# Patient Record
Sex: MALE | Race: OTHER | HISPANIC OR LATINO | Employment: FULL TIME | URBAN - METROPOLITAN AREA
[De-identification: names, ages, dates, MRNs, and addresses within clinical notes are randomized per-mention and may not be internally consistent; named-entity substitution may affect disease eponyms.]

---

## 2022-02-17 ENCOUNTER — HOSPITAL ENCOUNTER (EMERGENCY)
Facility: HOSPITAL | Age: 22
Discharge: HOME | End: 2022-02-17
Attending: EMERGENCY MEDICINE | Admitting: EMERGENCY MEDICINE

## 2022-02-17 VITALS
HEART RATE: 68 BPM | TEMPERATURE: 98.7 F | OXYGEN SATURATION: 99 % | WEIGHT: 137.57 LBS | DIASTOLIC BLOOD PRESSURE: 57 MMHG | RESPIRATION RATE: 17 BRPM | SYSTOLIC BLOOD PRESSURE: 96 MMHG

## 2022-02-17 DIAGNOSIS — G43.909 MIGRAINE: Primary | ICD-10-CM

## 2022-02-17 DIAGNOSIS — R73.09 ELEVATED GLUCOSE: ICD-10-CM

## 2022-02-17 DIAGNOSIS — E11.9 DIABETES (HCC): ICD-10-CM

## 2022-02-17 LAB
ALBUMIN SERPL BCP-MCNC: 4.4 G/DL (ref 3.5–5)
ALP SERPL-CCNC: 106 U/L (ref 46–116)
ALT SERPL W P-5'-P-CCNC: 15 U/L (ref 12–78)
ANION GAP SERPL CALCULATED.3IONS-SCNC: 10 MMOL/L (ref 4–13)
AST SERPL W P-5'-P-CCNC: 15 U/L (ref 5–45)
BASOPHILS # BLD AUTO: 0.04 THOUSANDS/ΜL (ref 0–0.1)
BASOPHILS NFR BLD AUTO: 1 % (ref 0–1)
BILIRUB SERPL-MCNC: 0.45 MG/DL (ref 0.2–1)
BUN SERPL-MCNC: 15 MG/DL (ref 5–25)
CALCIUM SERPL-MCNC: 8.9 MG/DL (ref 8.3–10.1)
CHLORIDE SERPL-SCNC: 100 MMOL/L (ref 100–108)
CO2 SERPL-SCNC: 26 MMOL/L (ref 21–32)
CREAT SERPL-MCNC: 0.82 MG/DL (ref 0.6–1.3)
EOSINOPHIL # BLD AUTO: 0.08 THOUSAND/ΜL (ref 0–0.61)
EOSINOPHIL NFR BLD AUTO: 1 % (ref 0–6)
ERYTHROCYTE [DISTWIDTH] IN BLOOD BY AUTOMATED COUNT: 11.4 % (ref 11.6–15.1)
GFR SERPL CREATININE-BSD FRML MDRD: 123 ML/MIN/1.73SQ M
GLUCOSE SERPL-MCNC: 353 MG/DL (ref 65–140)
GLUCOSE SERPL-MCNC: 387 MG/DL (ref 65–140)
HCT VFR BLD AUTO: 45.8 % (ref 36.5–49.3)
HGB BLD-MCNC: 16.4 G/DL (ref 12–17)
IMM GRANULOCYTES # BLD AUTO: 0.01 THOUSAND/UL (ref 0–0.2)
IMM GRANULOCYTES NFR BLD AUTO: 0 % (ref 0–2)
LYMPHOCYTES # BLD AUTO: 1.94 THOUSANDS/ΜL (ref 0.6–4.47)
LYMPHOCYTES NFR BLD AUTO: 23 % (ref 14–44)
MCH RBC QN AUTO: 30.3 PG (ref 26.8–34.3)
MCHC RBC AUTO-ENTMCNC: 35.8 G/DL (ref 31.4–37.4)
MCV RBC AUTO: 85 FL (ref 82–98)
MONOCYTES # BLD AUTO: 0.6 THOUSAND/ΜL (ref 0.17–1.22)
MONOCYTES NFR BLD AUTO: 7 % (ref 4–12)
NEUTROPHILS # BLD AUTO: 5.82 THOUSANDS/ΜL (ref 1.85–7.62)
NEUTS SEG NFR BLD AUTO: 68 % (ref 43–75)
NRBC BLD AUTO-RTO: 0 /100 WBCS
PLATELET # BLD AUTO: 277 THOUSANDS/UL (ref 149–390)
PMV BLD AUTO: 10.9 FL (ref 8.9–12.7)
POTASSIUM SERPL-SCNC: 4 MMOL/L (ref 3.5–5.3)
PROT SERPL-MCNC: 7.8 G/DL (ref 6.4–8.2)
RBC # BLD AUTO: 5.41 MILLION/UL (ref 3.88–5.62)
SODIUM SERPL-SCNC: 136 MMOL/L (ref 136–145)
WBC # BLD AUTO: 8.49 THOUSAND/UL (ref 4.31–10.16)

## 2022-02-17 PROCEDURE — 36415 COLL VENOUS BLD VENIPUNCTURE: CPT | Performed by: EMERGENCY MEDICINE

## 2022-02-17 PROCEDURE — 80053 COMPREHEN METABOLIC PANEL: CPT | Performed by: EMERGENCY MEDICINE

## 2022-02-17 PROCEDURE — 96374 THER/PROPH/DIAG INJ IV PUSH: CPT

## 2022-02-17 PROCEDURE — 96361 HYDRATE IV INFUSION ADD-ON: CPT

## 2022-02-17 PROCEDURE — 99283 EMERGENCY DEPT VISIT LOW MDM: CPT | Performed by: EMERGENCY MEDICINE

## 2022-02-17 PROCEDURE — 82948 REAGENT STRIP/BLOOD GLUCOSE: CPT

## 2022-02-17 PROCEDURE — 96375 TX/PRO/DX INJ NEW DRUG ADDON: CPT

## 2022-02-17 PROCEDURE — 99285 EMERGENCY DEPT VISIT HI MDM: CPT

## 2022-02-17 PROCEDURE — 85025 COMPLETE CBC W/AUTO DIFF WBC: CPT | Performed by: EMERGENCY MEDICINE

## 2022-02-17 RX ORDER — DIPHENHYDRAMINE HYDROCHLORIDE 50 MG/ML
25 INJECTION INTRAMUSCULAR; INTRAVENOUS ONCE
Status: COMPLETED | OUTPATIENT
Start: 2022-02-17 | End: 2022-02-17

## 2022-02-17 RX ORDER — KETOROLAC TROMETHAMINE 30 MG/ML
15 INJECTION, SOLUTION INTRAMUSCULAR; INTRAVENOUS ONCE
Status: COMPLETED | OUTPATIENT
Start: 2022-02-17 | End: 2022-02-17

## 2022-02-17 RX ORDER — METOCLOPRAMIDE HYDROCHLORIDE 5 MG/ML
10 INJECTION INTRAMUSCULAR; INTRAVENOUS ONCE
Status: COMPLETED | OUTPATIENT
Start: 2022-02-17 | End: 2022-02-17

## 2022-02-17 RX ORDER — ACETAMINOPHEN 325 MG/1
975 TABLET ORAL ONCE
Status: COMPLETED | OUTPATIENT
Start: 2022-02-17 | End: 2022-02-17

## 2022-02-17 RX ADMIN — METOCLOPRAMIDE 10 MG: 5 INJECTION, SOLUTION INTRAMUSCULAR; INTRAVENOUS at 18:20

## 2022-02-17 RX ADMIN — DIPHENHYDRAMINE HYDROCHLORIDE 25 MG: 50 INJECTION, SOLUTION INTRAMUSCULAR; INTRAVENOUS at 18:18

## 2022-02-17 RX ADMIN — SODIUM CHLORIDE 1000 ML: 0.9 INJECTION, SOLUTION INTRAVENOUS at 18:16

## 2022-02-17 RX ADMIN — ACETAMINOPHEN 975 MG: 325 TABLET, FILM COATED ORAL at 18:24

## 2022-02-17 RX ADMIN — KETOROLAC TROMETHAMINE 15 MG: 30 INJECTION, SOLUTION INTRAMUSCULAR at 18:23

## 2022-02-17 NOTE — ED ATTENDING ATTESTATION
2/17/2022  I, Jazzmine Sands MD, saw and evaluated the patient  I have discussed the patient with the resident/non-physician practitioner and agree with the resident's/non-physician practitioner's findings, Plan of Care, and MDM as documented in the resident's/non-physician practitioner's note, except where noted  All available labs and Radiology studies were reviewed  I was present for key portions of any procedure(s) performed by the resident/non-physician practitioner and I was immediately available to provide assistance  At this point I agree with the current assessment done in the Emergency Department  I have conducted an independent evaluation of this patient a history and physical is as follows:  22 yo M with IDDM type 1, glaucoma, c/o onset of right sided HA, earlier today, more intense than usual headaches, gradual onset, associated with photophobia, no N/V, no fever  Elevated blood sugar at home  Did not respond to NSAIDs at home  VS normal   No focal deficits  No meningismus  NAD  For his hyperglycemia, confirm no DKA or other abnormalities, replete fluids, treat headache, reassess        ED Course         Critical Care Time  Procedures

## 2022-02-17 NOTE — ED PROVIDER NOTES
History  Chief Complaint   Patient presents with    Dizziness     pt reports headache, nausea, dizziness and light sensitivity beginning this afternoon   Headache    Hyperglycemia - Symptomatic     pt reports elevated glucose in 400s x1 week     23year old male with a PMH of DM and glaucoma presents with a headache and high glucose  The headache began this afternoon  It is right sided and gradually got worse after it started  He has associated lightheadedness, but no room spinning dizziness  He reports photophobia and he feels like his vision gets blurry when he looks at the light  Patient reports getting right sided headaches in the past, but never this bad  His mom has a history of migraines  He tried Advil at 2:30 and it did not help  Patient also reports blood sugars in the 400s for the past week  He uses insulin and is still doing so as directed  He is still eating and drinking normally  He does not have a doctor that he follow with at this time   [id number] used  None       Past Medical History:   Diagnosis Date    Diabetes mellitus ([facility] [address] )     Glaucoma     Subaortic membrane        Past Surgical History:   Procedure Laterality Date    CARDIAC SURGERY         No family history on file  I have reviewed and agree with the history as documented  E-Cigarette/Vaping     E-Cigarette/Vaping Substances     Social History     Tobacco Use    Smoking status: Not on file    Smokeless tobacco: Not on file   Substance Use Topics    Alcohol use: Not on file    Drug use: Not on file        Review of Systems   Constitutional: Negative for appetite change, chills, fatigue and fever  HENT: Negative  Eyes: Positive for photophobia  Respiratory: Negative for cough, chest tightness and shortness of breath  Cardiovascular: Negative for chest pain and palpitations  Gastrointestinal: Negative for abdominal pain, diarrhea, nausea and vomiting  Endocrine: Negative  Genitourinary: Negative for difficulty urinating and hematuria  Musculoskeletal: Negative for arthralgias and myalgias  Skin: Negative for pallor and rash  Allergic/Immunologic: Negative  Neurological: Positive for light-headedness and headaches  Negative for dizziness and weakness  Hematological: Negative  Physical Exam  ED Triage Vitals   Temperature Pulse Respirations Blood Pressure SpO2   02/17/22 1710 02/17/22 1711 02/17/22 1711 02/17/22 1711 02/17/22 1711   98 7 °F (37 1 °C) 91 16 109/65 98 %      Temp Source Heart Rate Source Patient Position - Orthostatic VS BP Location FiO2 (%)   02/17/22 1710 02/17/22 1710 02/17/22 1711 02/17/22 1711 --   Oral Monitor Sitting Right arm       Pain Score       02/17/22 1820       6             Orthostatic Vital Signs  Vitals:    02/17/22 1711 02/17/22 1854   BP: 109/65 96/57   Pulse: 91 68   Patient Position - Orthostatic VS: Sitting Lying       Physical Exam  Vitals and nursing note reviewed  Constitutional:       General: He is not in acute distress  Appearance: Normal appearance  He is not ill-appearing  HENT:      Head: Normocephalic and atraumatic  Eyes:      Conjunctiva/sclera: Conjunctivae normal    Cardiovascular:      Rate and Rhythm: Normal rate and regular rhythm  Pulmonary:      Effort: Pulmonary effort is normal    Musculoskeletal:         General: Normal range of motion  Cervical back: Normal range of motion and neck supple  Skin:     General: Skin is warm and dry  Neurological:      General: No focal deficit present  Mental Status: He is alert and oriented to person, place, and time  Cranial Nerves: No cranial nerve deficit  Motor: No weakness           ED Medications  Medications   sodium chloride 0 9 % bolus 1,000 mL (0 mL Intravenous Stopped 2/17/22 1858)   ketorolac (TORADOL) injection 15 mg (15 mg Intravenous Given 2/17/22 1823)   acetaminophen (TYLENOL) tablet 975 mg (975 mg Oral Given 2/17/22 1824) metoclopramide (REGLAN) injection 10 mg (10 mg Intravenous Given 2/17/22 1820)   diphenhydrAMINE (BENADRYL) injection 25 mg (25 mg Intravenous Given 2/17/22 1818)       Diagnostic Studies  Results Reviewed     Procedure Component Value Units Date/Time    Comprehensive metabolic panel [[id number]]  (Abnormal) Collected: 02/17/22 1825    Lab Status: Final result Specimen: Blood from Arm, Right Updated: 02/17/22 1853     Sodium 136 mmol/L      Potassium 4 0 mmol/L      Chloride 100 mmol/L      CO2 26 mmol/L      ANION GAP 10 mmol/L      BUN 15 mg/dL      Creatinine 0 82 mg/dL      Glucose 387 mg/dL      Calcium 8 9 mg/dL      AST 15 U/L      ALT 15 U/L      Alkaline Phosphatase 106 U/L      Total Protein 7 8 g/dL      Albumin 4 4 g/dL      Total Bilirubin 0 45 mg/dL      eGFR 123 ml/min/1 73sq m     Narrative:      National Kidney Disease Foundation guidelines for Chronic Kidney Disease (CKD):     Stage 1 with normal or high GFR (GFR > 90 mL/min/1 73 square meters)    Stage 2 Mild CKD (GFR = 60-89 mL/min/1 73 square meters)    Stage 3A Moderate CKD (GFR = 45-59 mL/min/1 73 square meters)    Stage 3B Moderate CKD (GFR = 30-44 mL/min/1 73 square meters)    Stage 4 Severe CKD (GFR = 15-29 mL/min/1 73 square meters)    Stage 5 End Stage CKD (GFR <15 mL/min/1 73 square meters)  Note: GFR calculation is accurate only with a steady state creatinine    CBC and differential [[id number]]  (Abnormal) Collected: 02/17/22 1825    Lab Status: Final result Specimen: Blood from Arm, Right Updated: 02/17/22 1838     WBC 8 49 Thousand/uL      RBC 5 41 Million/uL      Hemoglobin 16 4 g/dL      Hematocrit 45 8 %      MCV 85 fL      MCH 30 3 pg      MCHC 35 8 g/dL      RDW 11 4 %      MPV 10 9 fL      Platelets 167 Thousands/uL      nRBC 0 /100 WBCs      Neutrophils Relative 68 %      Immat GRANS % 0 %      Lymphocytes Relative 23 %      Monocytes Relative 7 %      Eosinophils Relative 1 %      Basophils Relative 1 %      Neutrophils Absolute 5 82 Thousands/µL      Immature Grans Absolute 0 01 Thousand/uL      Lymphocytes Absolute 1 94 Thousands/µL      Monocytes Absolute 0 60 Thousand/µL      Eosinophils Absolute 0 08 Thousand/µL      Basophils Absolute 0 04 Thousands/µL     Fingerstick Glucose (POCT) [[id number]]  (Abnormal) Collected: 02/17/22 1831    Lab Status: Final result Updated: 02/17/22 1832     POC Glucose 353 mg/dl                  No orders to display         Procedures  Procedures      ED Course                                       MDM  Number of Diagnoses or Management Options  Diabetes ([facility] ): established and worsening  Elevated glucose: established and worsening  Migraine: established and improving  Diagnosis management comments: 77-year-old female with history of diabetes and glaucoma presents with headache and elevated blood sugars  Patient has had similar headaches in the past   I spoke to patient about blood glucose management  I told him that his headache could be exacerbated by his high blood sugars  I told him that he needs follow-up with a doctor  Patient does not currently have 1  Will give patient a doctor to follow up with  Will treat migraine  Will order basic labs to make sure that patient does not have anion gap or abnormal bicarb  Amount and/or Complexity of Data Reviewed  Clinical lab tests: ordered and reviewed  Review and summarize past medical records: yes  Discuss the patient with other providers: yes    Risk of Complications, Morbidity, and/or Mortality  Presenting problems: moderate  Diagnostic procedures: moderate  Management options: moderate    Patient Progress  Patient progress: improved    Patient felt better after receiving medications here  I spoke with him about the importance of follow-up for his blood sugars  Patient was given a referral to [facility]  Return precautions given      Disposition  Final diagnoses:   Migraine   Elevated glucose   Diabetes ([facility] )     Time reflects when diagnosis was documented in both MDM as applicable and the Disposition within this note     Time User Action Codes Description Comment    2/17/2022  7:14 PM Bouchra Schmidt [K70 828] Migraine     2/17/2022  7:15 PM Shanna Garcia Add [R73 09] Elevated glucose     2/17/2022  7:15 PM Yoselin Lombardi Add [E11 9] Diabetes [facility])       ED Disposition     ED Disposition Condition Date/Time Comment    Discharge Good Thu Feb 17, 2022  7:14 PM Regan Moreno discharge to home/self care  Follow-up Information     Follow up With Specialties Details Why Contact Info Additional [address]   [address], [address] [facility] [id number]  [address] [place] [address], [address], [address] [place], [place], [address] [place] [address] Avenue          There are no discharge medications for this patient  PDMP Review     None           ED Provider  Attending physically available and evaluated Regan Moreno I managed the patient along with the ED Attending      Electronically Signed by         Akin Leslie DO  02/17/22 2010

## 2022-02-17 NOTE — Clinical Note
Josette Costa was seen and treated in our emergency department on 2/17/2022  Diagnosis:     Leeanne Mendieta  may return to work on return date  He may return on this date: 02/18/2022         If you have any questions or concerns, please don't hesitate to call        eVntura Ibrahim DO    ______________________________           _______________          _______________  Hospital Representative                              Date                                Time

## 2022-02-18 NOTE — ED NOTES
AVS reviewed with pt by provider, pt verbalized understanding of d/c instructions  No questions or concerns  VSS  Pt left ambulatory with steady gait with all belongings        Vassie Opitz, RN  02/17/22 1944

## 2022-02-18 NOTE — DISCHARGE INSTRUCTIONS
You have been seen for a headache and diabetes  Your blood sugars have been very high and you might need your insulin regimen changed  You should return to the ED if you develop vomiting, mental status change, trouble breathing, or other worsening symptoms  Follow up with your primary care physician

## 2022-03-07 ENCOUNTER — APPOINTMENT (EMERGENCY)
Dept: CT IMAGING | Facility: HOSPITAL | Age: 22
End: 2022-03-07

## 2022-03-07 ENCOUNTER — HOSPITAL ENCOUNTER (EMERGENCY)
Facility: HOSPITAL | Age: 22
Discharge: HOME | End: 2022-03-07
Attending: EMERGENCY MEDICINE

## 2022-03-07 VITALS
TEMPERATURE: 98.9 F | SYSTOLIC BLOOD PRESSURE: 112 MMHG | RESPIRATION RATE: 16 BRPM | DIASTOLIC BLOOD PRESSURE: 62 MMHG | WEIGHT: 131.84 LBS | HEART RATE: 86 BPM | OXYGEN SATURATION: 98 %

## 2022-03-07 DIAGNOSIS — E87.6 HYPOKALEMIA: ICD-10-CM

## 2022-03-07 DIAGNOSIS — S20.211A RIB CONTUSION, RIGHT, INITIAL ENCOUNTER: Primary | ICD-10-CM

## 2022-03-07 DIAGNOSIS — R73.9 HYPERGLYCEMIA: ICD-10-CM

## 2022-03-07 DIAGNOSIS — R10.9 ABDOMINAL PAIN: ICD-10-CM

## 2022-03-07 DIAGNOSIS — N28.1 RENAL CYST: ICD-10-CM

## 2022-03-07 DIAGNOSIS — R51.9 HEADACHE: ICD-10-CM

## 2022-03-07 LAB
ALBUMIN SERPL BCP-MCNC: 3.1 G/DL (ref 3.5–5)
ALP SERPL-CCNC: 84 U/L (ref 46–116)
ALT SERPL W P-5'-P-CCNC: 16 U/L (ref 12–78)
ANION GAP SERPL CALCULATED.3IONS-SCNC: 10 MMOL/L (ref 4–13)
AST SERPL W P-5'-P-CCNC: 10 U/L (ref 5–45)
BASOPHILS # BLD AUTO: 0.02 THOUSANDS/ΜL (ref 0–0.1)
BASOPHILS NFR BLD AUTO: 0 % (ref 0–1)
BILIRUB SERPL-MCNC: 0.64 MG/DL (ref 0.2–1)
BUN SERPL-MCNC: 11 MG/DL (ref 5–25)
CALCIUM ALBUM COR SERPL-MCNC: 8.7 MG/DL (ref 8.3–10.1)
CALCIUM SERPL-MCNC: 8 MG/DL (ref 8.3–10.1)
CHLORIDE SERPL-SCNC: 103 MMOL/L (ref 100–108)
CO2 SERPL-SCNC: 24 MMOL/L (ref 21–32)
CREAT SERPL-MCNC: 0.56 MG/DL (ref 0.6–1.3)
EOSINOPHIL # BLD AUTO: 0.02 THOUSAND/ΜL (ref 0–0.61)
EOSINOPHIL NFR BLD AUTO: 0 % (ref 0–6)
ERYTHROCYTE [DISTWIDTH] IN BLOOD BY AUTOMATED COUNT: 11.9 % (ref 11.6–15.1)
FLUAV RNA RESP QL NAA+PROBE: NEGATIVE
FLUBV RNA RESP QL NAA+PROBE: NEGATIVE
GFR SERPL CREATININE-BSD FRML MDRD: 144 ML/MIN/1.73SQ M
GLUCOSE SERPL-MCNC: 232 MG/DL (ref 65–140)
HCT VFR BLD AUTO: 49 % (ref 36.5–49.3)
HGB BLD-MCNC: 17.5 G/DL (ref 12–17)
IMM GRANULOCYTES # BLD AUTO: 0.03 THOUSAND/UL (ref 0–0.2)
IMM GRANULOCYTES NFR BLD AUTO: 0 % (ref 0–2)
LIPASE SERPL-CCNC: 43 U/L (ref 73–393)
LYMPHOCYTES # BLD AUTO: 1.43 THOUSANDS/ΜL (ref 0.6–4.47)
LYMPHOCYTES NFR BLD AUTO: 14 % (ref 14–44)
MCH RBC QN AUTO: 29.9 PG (ref 26.8–34.3)
MCHC RBC AUTO-ENTMCNC: 35.7 G/DL (ref 31.4–37.4)
MCV RBC AUTO: 84 FL (ref 82–98)
MONOCYTES # BLD AUTO: 0.98 THOUSAND/ΜL (ref 0.17–1.22)
MONOCYTES NFR BLD AUTO: 9 % (ref 4–12)
NEUTROPHILS # BLD AUTO: 8.1 THOUSANDS/ΜL (ref 1.85–7.62)
NEUTS SEG NFR BLD AUTO: 77 % (ref 43–75)
NRBC BLD AUTO-RTO: 0 /100 WBCS
PLATELET # BLD AUTO: 272 THOUSANDS/UL (ref 149–390)
PMV BLD AUTO: 10.7 FL (ref 8.9–12.7)
POTASSIUM SERPL-SCNC: 3.2 MMOL/L (ref 3.5–5.3)
PROT SERPL-MCNC: 6.2 G/DL (ref 6.4–8.2)
RBC # BLD AUTO: 5.86 MILLION/UL (ref 3.88–5.62)
RSV RNA RESP QL NAA+PROBE: NEGATIVE
SARS-COV-2 RNA RESP QL NAA+PROBE: NEGATIVE
SODIUM SERPL-SCNC: 137 MMOL/L (ref 136–145)
WBC # BLD AUTO: 10.58 THOUSAND/UL (ref 4.31–10.16)

## 2022-03-07 PROCEDURE — 71260 CT THORAX DX C+: CPT

## 2022-03-07 PROCEDURE — 74177 CT ABD & PELVIS W/CONTRAST: CPT

## 2022-03-07 PROCEDURE — 36415 COLL VENOUS BLD VENIPUNCTURE: CPT | Performed by: PHYSICIAN ASSISTANT

## 2022-03-07 PROCEDURE — 96374 THER/PROPH/DIAG INJ IV PUSH: CPT

## 2022-03-07 PROCEDURE — 99284 EMERGENCY DEPT VISIT MOD MDM: CPT

## 2022-03-07 PROCEDURE — 99285 EMERGENCY DEPT VISIT HI MDM: CPT | Performed by: PHYSICIAN ASSISTANT

## 2022-03-07 PROCEDURE — 85025 COMPLETE CBC W/AUTO DIFF WBC: CPT | Performed by: PHYSICIAN ASSISTANT

## 2022-03-07 PROCEDURE — 80053 COMPREHEN METABOLIC PANEL: CPT | Performed by: PHYSICIAN ASSISTANT

## 2022-03-07 PROCEDURE — 83690 ASSAY OF LIPASE: CPT | Performed by: PHYSICIAN ASSISTANT

## 2022-03-07 PROCEDURE — 0241U HB NFCT DS VIR RESP RNA 4 TRGT: CPT | Performed by: PHYSICIAN ASSISTANT

## 2022-03-07 PROCEDURE — G1004 CDSM NDSC: HCPCS

## 2022-03-07 RX ORDER — POTASSIUM CHLORIDE 20 MEQ/1
40 TABLET, EXTENDED RELEASE ORAL ONCE
Status: COMPLETED | OUTPATIENT
Start: 2022-03-07 | End: 2022-03-07

## 2022-03-07 RX ORDER — MORPHINE SULFATE 4 MG/ML
4 INJECTION, SOLUTION INTRAMUSCULAR; INTRAVENOUS ONCE
Status: COMPLETED | OUTPATIENT
Start: 2022-03-07 | End: 2022-03-07

## 2022-03-07 RX ADMIN — IOHEXOL 100 ML: 350 INJECTION, SOLUTION INTRAVENOUS at 12:05

## 2022-03-07 RX ADMIN — MORPHINE SULFATE 4 MG: 4 INJECTION INTRAVENOUS at 11:47

## 2022-03-07 RX ADMIN — POTASSIUM CHLORIDE 40 MEQ: 1500 TABLET, EXTENDED RELEASE ORAL at 13:18

## 2022-03-07 NOTE — ED NOTES
ED provider, Kavitha Coleman PA-C, bedside for patient evaluation          Vinicius Matthews RN  03/07/22 1037

## 2022-03-07 NOTE — DISCHARGE INSTRUCTIONS
DISCHARGE INSTRUCTIONS:    FOLLOW UP WITH YOUR PRIMARY CARE PROVIDER OR THE [address] [place] [address]  MAKE AN APPOINTMENT TO BE SEEN  TAKE TYLENOL OR MOTRIN FOR PAIN  REST AND ICE THE RIB AREA  IF SYMPTOMS WORSEN OR NEW SYMPTOMS ARISE, RETURN TO THE ER TO BE SEEN

## 2022-03-07 NOTE — ED PROVIDER NOTES
History  Chief Complaint   Patient presents with    Rib Injury     thursday morning patient fell down stairs at work and injured right side     22y  o male with PMH of DM, glaucoma and subaortic membrane presents to the ER for evaluation  Patient states on Thursday, he was midway up a ladder when he fell off and hit his right ribs  He denies hitting his head  Patient states his manager told him he did not have to be evaluated  Patient states he has been having rib pain since falling  He has been taking Advil for pain  He describes his pain as burning and non-radiating  Pain is constant but worse with movements and deep breaths  Patient also reports right sided abdominal pain, subjective fevers, headaches and cough  He denies chills, rhinorrhea/congestion, sore throat, dyspnea, N/V/D, urinary symptoms, weakness or paresthesias  History provided by:  Patient   used: No        None       Past Medical History:   Diagnosis Date    Diabetes mellitus ([facility] [address] )     Glaucoma     Subaortic membrane        Past Surgical History:   Procedure Laterality Date    CARDIAC SURGERY         History reviewed  No pertinent family history  I have reviewed and agree with the history as documented  E-Cigarette/Vaping     E-Cigarette/Vaping Substances     Social History     Tobacco Use    Smoking status: Never Smoker    Smokeless tobacco: Never Used   Substance Use Topics    Alcohol use: Not Currently    Drug use: Not on file       Review of Systems   Constitutional: Positive for fever (subjective)  Negative for activity change, appetite change and chills  HENT: Negative for congestion, drooling, ear discharge, ear pain, facial swelling, rhinorrhea and sore throat  Eyes: Negative for redness  Respiratory: Positive for cough  Negative for shortness of breath  Cardiovascular: Positive for chest pain (rib)  Gastrointestinal: Positive for abdominal pain  Negative for diarrhea, nausea and vomiting  Genitourinary: Negative for dysuria, frequency, hematuria and urgency  Musculoskeletal: Negative for neck stiffness  Skin: Negative for rash  Allergic/Immunologic: Negative for food allergies  Neurological: Negative for weakness and numbness  Physical Exam  Physical Exam  Vitals and nursing note reviewed  Constitutional:       General: He is not in acute distress  Appearance: He is not toxic-appearing  HENT:      Head: Normocephalic and atraumatic  Eyes:      Conjunctiva/sclera: Conjunctivae normal    Neck:      Trachea: No tracheal deviation  Cardiovascular:      Rate and Rhythm: Normal rate and regular rhythm  Heart sounds: Normal heart sounds, S1 normal and S2 normal  No murmur heard  No friction rub  No gallop  Pulmonary:      Effort: Pulmonary effort is normal  No respiratory distress  Breath sounds: Normal breath sounds  No decreased breath sounds, wheezing, rhonchi or rales  Chest:      Chest wall: Tenderness present  No deformity, swelling or crepitus  Abdominal:      General: Bowel sounds are normal  There is no distension  Palpations: Abdomen is soft  Tenderness: There is abdominal tenderness in the right upper quadrant and right lower quadrant  There is no guarding or rebound  Musculoskeletal:      Cervical back: Normal range of motion and neck supple  Skin:     General: Skin is warm and dry  Findings: No rash  Neurological:      Mental Status: He is alert  GCS: GCS eye subscore is 4  GCS verbal subscore is 5  GCS motor subscore is 6     Psychiatric:         Mood and Affect: Mood normal          Vital Signs  ED Triage Vitals   Temperature Pulse Respirations Blood Pressure SpO2   03/07/22 0957 03/07/22 0957 03/07/22 0957 03/07/22 0957 03/07/22 1004   98 9 °F (37 2 °C) 105 16 106/66 97 %      Temp Source Heart Rate Source Patient Position - Orthostatic VS BP Location FiO2 (%)   03/07/22 0957 03/07/22 0957 -- 03/07/22 1319 --   Oral Monitor  Right arm       Pain Score       03/07/22 1147       6           Vitals:    03/07/22 0957 03/07/22 1319   BP: 106/66 112/62   Pulse: 105 86         Visual Acuity      ED Medications  Medications   morphine (PF) 4 mg/mL injection 4 mg (4 mg Intravenous Given 3/7/22 1147)   iohexol (OMNIPAQUE) 350 MG/ML injection (SINGLE-DOSE) 100 mL (100 mL Intravenous Given 3/7/22 1205)   potassium chloride (K-DUR,KLOR-CON) CR tablet 40 mEq (40 mEq Oral Given 3/7/22 1318)       Diagnostic Studies  Results Reviewed     Procedure Component Value Units Date/Time    Comprehensive metabolic panel [[id number]]  (Abnormal) Collected: 03/07/22 1231    Lab Status: Final result Specimen: Blood from Arm, Left Updated: 03/07/22 1253     Sodium 137 mmol/L      Potassium 3 2 mmol/L      Chloride 103 mmol/L      CO2 24 mmol/L      ANION GAP 10 mmol/L      BUN 11 mg/dL      Creatinine 0 56 mg/dL      Glucose 232 mg/dL      Calcium 8 0 mg/dL      Corrected Calcium 8 7 mg/dL      AST 10 U/L      ALT 16 U/L      Alkaline Phosphatase 84 U/L      Total Protein 6 2 g/dL      Albumin 3 1 g/dL      Total Bilirubin 0 64 mg/dL      eGFR 144 ml/min/1 73sq m     Narrative:      [place] guidelines for Chronic Kidney Disease (CKD):     Stage 1 with normal or high GFR (GFR > 90 mL/min/1 73 square meters)    Stage 2 Mild CKD (GFR = 60-89 mL/min/1 73 square meters)    Stage 3A Moderate CKD (GFR = 45-59 mL/min/1 73 square meters)    Stage 3B Moderate CKD (GFR = 30-44 mL/min/1 73 square meters)    Stage 4 Severe CKD (GFR = 15-29 mL/min/1 73 square meters)    Stage 5 End Stage CKD (GFR <15 mL/min/1 73 square meters)  Note: GFR calculation is accurate only with a steady state creatinine    Lipase [[id number]]  (Abnormal) Collected: 03/07/22 1231    Lab Status: Final result Specimen: Blood from Arm, Left Updated: 03/07/22 1253     Lipase 43 u/L     COVID/FLU/RSV - 2 hour TAT [[id number]]  (Normal) Collected: 03/07/22 1145    Lab Status: Final result Specimen: Nares from Nasopharyngeal Swab Updated: 03/07/22 1240     SARS-CoV-2 Negative     INFLUENZA A PCR Negative     INFLUENZA B PCR Negative     RSV PCR Negative    Narrative:      FOR PEDIATRIC PATIENTS - copy/paste COVID Guidelines URL to browser: [email or web address]    SARS-CoV-2 assay is a Nucleic Acid Amplification assay intended for the  qualitative detection of nucleic acid from SARS-CoV-2 in nasopharyngeal  swabs  Results are for the presumptive identification of SARS-CoV-2 RNA  Positive results are indicative of infection with SARS-CoV-2, the virus  causing COVID-19, but do not rule out bacterial infection or co-infection  with other viruses  Laboratories within the [place] and its  territories are required to report all positive results to the appropriate  public health authorities  Negative results do not preclude SARS-CoV-2  infection and should not be used as the sole basis for treatment or other  patient management decisions  Negative results must be combined with  clinical observations, patient history, and epidemiological information  This test has not been FDA cleared or approved  This test has been authorized by FDA under an Emergency Use Authorization  (EUA)  This test is only authorized for the duration of time the  declaration that circumstances exist justifying the authorization of the  emergency use of an in vitro diagnostic tests for detection of SARS-CoV-2  virus and/or diagnosis of COVID-19 infection under section 564(b)(1) of  the Act, 21 U  S C  231PEP-0(E)(2), unless the authorization is terminated  or revoked sooner  The test has been validated but independent review by FDA  and CLIA is pending  Test performed using [employer] Gene[employer]pert: This RT-PCR assay targets N2,  a region unique to SARS-CoV-2   A conserved region in the E-gene was chosen  for pan-Sarbecovirus detection which includes SARS-CoV-2  CBC and differential [[id number]]  (Abnormal) Collected: 03/07/22 1145    Lab Status: Final result Specimen: Blood from Arm, Left Updated: 03/07/22 1156     WBC 10 58 Thousand/uL      RBC 5 86 Million/uL      Hemoglobin 17 5 g/dL      Hematocrit 49 0 %      MCV 84 fL      MCH 29 9 pg      MCHC 35 7 g/dL      RDW 11 9 %      MPV 10 7 fL      Platelets 017 Thousands/uL      nRBC 0 /100 WBCs      Neutrophils Relative 77 %      Immat GRANS % 0 %      Lymphocytes Relative 14 %      Monocytes Relative 9 %      Eosinophils Relative 0 %      Basophils Relative 0 %      Neutrophils Absolute 8 10 Thousands/µL      Immature Grans Absolute 0 03 Thousand/uL      Lymphocytes Absolute 1 43 Thousands/µL      Monocytes Absolute 0 98 Thousand/µL      Eosinophils Absolute 0 02 Thousand/µL      Basophils Absolute 0 02 Thousands/µL                  CT chest abdomen pelvis w contrast   Final Result by Gt Castillo MD (03/07 1241)      No acute findings in the chest, abdomen or pelvis  Nonemergent findings above, including several developmental vascular anomalies  Workstation performed: [id number]                    Procedures  Procedures         ED Course  ED Course as of 03/07/22 1418   Mon Mar 07, 2022   1308 Potassium(!): 3 2  Will replete                               SBIRT 22yo+      Most Recent Value   SBIRT (25 yo +)    In order to provide better care to our patients, we are screening all of our patients for alcohol and drug use  Would it be okay to ask you these screening questions?  No Filed at: 03/07/2022 1200                    MDM  Number of Diagnoses or Management Options  Abdominal pain: new and requires workup  Headache: new and requires workup  Hyperglycemia: new and requires workup  Hypokalemia: new and requires workup  Renal cyst: new and requires workup  Rib contusion, right, initial encounter: new and requires workup  Diagnosis management comments: DDX consists of but not limited to: fracture, contusion, abdominal trauma, covid, viral syndrome    Will check labs and imaging  1308 Potassium(!): 3 2 - Will replete    1310    Informed patient of lab and imaging findings  Will discharge  Patient agreeable  The management plan was discussed in detail with the patient at bedside and all questions were answered  Prior to discharge, we provided both verbal and written instructions  We discussed with the patient the signs and symptoms for which to return to the emergency department  All questions were answered and patient was comfortable with the plan of care and discharged to home  Instructed the patient to follow up with the primary care provider and/or specialist provided and their written instructions  The patient verbalized understanding of our discussion and plan of care, and agrees to return to the Emergency Department for concerns and progression of illness  At discharge, I instructed the patient to:  -follow up with pcp  -take Tylenol or Motrin for pain  -rest and apply ice to the area  -return to the ER if symptoms worsened or new symptoms arose  Patient agreed to this plan and was stable at time of discharge           Amount and/or Complexity of Data Reviewed  Clinical lab tests: ordered and reviewed  Tests in the radiology section of CPT®: ordered and reviewed  Independent visualization of images, tracings, or specimens: yes    Patient Progress  Patient progress: stable      Disposition  Final diagnoses:   Rib contusion, right, initial encounter   Abdominal pain   Headache   Hypokalemia   Hyperglycemia   Renal cyst     Time reflects when diagnosis was documented in both MDM as applicable and the Disposition within this note     Time User Action Codes Description Comment    3/7/2022  1:15 PM Patino, [address] Rib contusion, right, initial encounter     3/7/2022  1:15 PM Per BONDS Add [R10 9] Abdominal pain     3/7/2022  1:15 PM Per BONDS Add [R51 9] Headache     3/7/2022  1:16 PM Kristine BONDS Add [E87 6] Hypokalemia     3/7/2022  1:16 PM Kristine BONDS Add [R73 9] Hyperglycemia     3/7/2022  1:17 PM Kristine BONDS Add [N28 1] Renal cyst       ED Disposition     ED Disposition Condition Date/Time Comment    Discharge Stable Mon Mar 7, 2022  1:15 PM Anahy Londono discharge to home/self care  Follow-up Information     Follow up With Specialties Details Why Contact Info Additional [facility] Schedule an appointment as soon as possible for a visit  As needed [address], [address] [facility] [id number] [place] [address], [address], [address] [place], [place], [address] [place] [address] Avenue          There are no discharge medications for this patient  No discharge procedures on file      PDMP Review     None          ED Provider  Electronically Signed by           Paras Lynn PA-C  03/07/22 7965

## 2022-03-07 NOTE — Clinical Note
Elle Garcias was seen and treated in our emergency department on 3/7/2022  No restrictions            Diagnosis:     Destiny Wilson  may return to work on return date  He may return on this date: 03/08/2022    Please allow frequent bathroom breaks due to medical problems  If you have any questions or concerns, please don't hesitate to call        Fanny Parisi RN    ______________________________           _______________          _______________  GLORY COELLO [facility]ULEYKA [facility] Representative                              Date                                Time

## 2022-03-16 ENCOUNTER — HOSPITAL ENCOUNTER (EMERGENCY)
Facility: HOSPITAL | Age: 22
Discharge: HOME | End: 2022-03-16
Attending: EMERGENCY MEDICINE | Admitting: EMERGENCY MEDICINE

## 2022-03-16 VITALS
RESPIRATION RATE: 20 BRPM | DIASTOLIC BLOOD PRESSURE: 60 MMHG | WEIGHT: 133.6 LBS | SYSTOLIC BLOOD PRESSURE: 110 MMHG | OXYGEN SATURATION: 98 % | HEART RATE: 100 BPM | TEMPERATURE: 98.6 F

## 2022-03-16 DIAGNOSIS — R07.89 RIGHT-SIDED CHEST WALL PAIN: Primary | ICD-10-CM

## 2022-03-16 PROCEDURE — 99283 EMERGENCY DEPT VISIT LOW MDM: CPT

## 2022-03-16 PROCEDURE — 99284 EMERGENCY DEPT VISIT MOD MDM: CPT | Performed by: EMERGENCY MEDICINE

## 2022-03-16 RX ORDER — LIDOCAINE 50 MG/G
1 PATCH TOPICAL DAILY
Qty: 7 PATCH | Refills: 0 | Status: SHIPPED | OUTPATIENT
Start: 2022-03-16 | End: 2022-07-28

## 2022-03-16 RX ORDER — KETOROLAC TROMETHAMINE 30 MG/ML
30 INJECTION, SOLUTION INTRAMUSCULAR; INTRAVENOUS ONCE
Status: DISCONTINUED | OUTPATIENT
Start: 2022-03-16 | End: 2022-03-16 | Stop reason: HOSPADM

## 2022-03-16 RX ORDER — IBUPROFEN 600 MG/1
600 TABLET ORAL EVERY 6 HOURS PRN
Qty: 30 TABLET | Refills: 0 | Status: SHIPPED | OUTPATIENT
Start: 2022-03-16 | End: 2022-07-29

## 2022-03-16 NOTE — ED NOTES
Pt assessed prior to this RN's assessment  Physician in room and assessed prior        Meka Landin RN  03/16/22 1954

## 2022-03-16 NOTE — ED PROVIDER NOTES
History  Chief Complaint   Patient presents with    Rib Pain     patient reports pain on the right side that he reports as a nine  Patient reports that the he was here one week prior for the same and told to use tylenol/motrin and hot/cold with no relief  History provided by:  Patient and medical records   used: Yes    Medical Problem - Major  Location:  Right sided chest wall pain  Giovana Garcia last week  CT chest no rib fx or pulm contusion  Continued pain and nothing helping  No fever  No sob  Pain with breathing and movement  Quality:  Fall apparently occurred while working for [employer]  He was apparently let go and works somewhere else  Severity:  Severe  Onset quality:  Sudden  Duration:  1 week  Timing:  Constant  Progression:  Unchanged  Chronicity:  New  Relieved by:  Nothing  Worsened by:  Palpation, movement  Ineffective treatments:  Ibuprofen  Associated symptoms: chest pain    Associated symptoms: no abdominal pain and no shortness of breath    No PCP  No follow up occumed  Just changed jobs  Pain is on the right side chest, continues  No fever or sob  No abd pain  No rib fractures or pulmonary findings on his CT scan:  IMPRESSION:     No acute findings in the chest, abdomen or pelvis      Nonemergent findings above, including several developmental vascular anomalies        None       Past Medical History:   Diagnosis Date    Diabetes mellitus ([facility] [address] )     Glaucoma     Subaortic membrane        Past Surgical History:   Procedure Laterality Date    CARDIAC SURGERY         History reviewed  No pertinent family history  I have reviewed and agree with the history as documented  E-Cigarette/Vaping     E-Cigarette/Vaping Substances     Social History     Tobacco Use    Smoking status: Never Smoker    Smokeless tobacco: Never Used   Substance Use Topics    Alcohol use: Not Currently    Drug use: Not on file       Review of Systems   Respiratory: Negative for shortness of breath  Cardiovascular: Positive for chest pain  Gastrointestinal: Negative for abdominal pain  Musculoskeletal: Negative for back pain and gait problem  Skin: Negative for color change and wound  Physical Exam  Physical Exam  Vitals and nursing note reviewed  Constitutional:       General: He is not in acute distress  Appearance: Normal appearance  He is well-developed  He is not ill-appearing, toxic-appearing or diaphoretic  HENT:      Head: Normocephalic and atraumatic  Right Ear: Hearing normal  No drainage or swelling  Left Ear: Hearing normal  No drainage or swelling  Eyes:      General: Lids are normal          Right eye: No discharge  Left eye: No discharge  Conjunctiva/sclera: Conjunctivae normal    Neck:      Vascular: No JVD  Trachea: Trachea normal    Cardiovascular:      Rate and Rhythm: Normal rate and regular rhythm  Pulses: Normal pulses  Heart sounds: Normal heart sounds  No murmur heard  No friction rub  No gallop  Pulmonary:      Effort: Pulmonary effort is normal  No respiratory distress  Breath sounds: Normal breath sounds  No stridor  No wheezing or rales  Comments: There is no bruising or wounds or crepitus to the chest wall  Palpation causes some discomfort  Chest:      Chest wall: Tenderness present  Abdominal:      Palpations: Abdomen is soft  Tenderness: There is no abdominal tenderness  There is no guarding or rebound  Musculoskeletal:         General: No tenderness or deformity  Normal range of motion  Cervical back: Normal and normal range of motion  Thoracic back: Normal       Lumbar back: Normal    Skin:     General: Skin is warm and dry  Coloration: Skin is not pale  Findings: No rash  Neurological:      General: No focal deficit present  Mental Status: He is alert  GCS: GCS eye subscore is 4  GCS verbal subscore is 5  GCS motor subscore is 6        Sensory: No sensory deficit  Motor: No abnormal muscle tone  Psychiatric:         Mood and Affect: Mood normal          Speech: Speech normal          Behavior: Behavior is cooperative  Vital Signs  ED Triage Vitals   Temperature Pulse Respirations Blood Pressure SpO2   03/16/22 1030 03/16/22 1023 03/16/22 1023 03/16/22 1023 03/16/22 1023   98 6 °F (37 °C) 100 20 110/60 98 %      Temp Source Heart Rate Source Patient Position - Orthostatic VS BP Location FiO2 (%)   03/16/22 1030 03/16/22 1023 03/16/22 1023 03/16/22 1023 --   Oral Monitor Sitting Right arm       Pain Score       03/16/22 1023       9           Vitals:    03/16/22 1023   BP: 110/60   Pulse: 100   Patient Position - Orthostatic VS: Sitting         Visual Acuity      ED Medications  Medications - No data to display    Diagnostic Studies  Results Reviewed     None                 No orders to display              Procedures  Procedures         ED Course                                             MDM  Number of Diagnoses or Management Options  Right-sided chest wall pain  Diagnosis management comments: Lungs clear  O2 sat normal   Exam as above chest wall tenderness  Reviewed CT scan as well as the images and there was no rib fracture or pulmonary contusion  He had some nonemergent incidental findings that will need follow-up with primary care  He no longer has a primary care doctor so I put in a referral to the [facility] for follow-up  I suspect chest wall contusion however there is no bruising so does not appear that he had a significant injury presently         Amount and/or Complexity of Data Reviewed  Tests in the radiology section of CPT®: reviewed    Patient Progress  Patient progress: stable      Disposition  Final diagnoses:   Right-sided chest wall pain - contusion     Time reflects when diagnosis was documented in both MDM as applicable and the Disposition within this note     Time User Action Codes Description Comment    3/16/2022 10:59 AM Barabara Ashing Add [S20 211A] Rib contusion, right, initial encounter     3/16/2022 10:59 AM Barabara Ashing Remove [S20 211A] Rib contusion, right, initial encounter     3/16/2022 10:59 AM Barabara Ashing Add [R07 89] Right-sided chest wall pain     3/16/2022 10:59 AM Barabara Ashing Modify [R07 89] Right-sided chest wall pain constusion    3/16/2022 10:59 AM Barabara Ashing Modify [R07 89] Right-sided chest wall pain contusion      ED Disposition     ED Disposition Condition Date/Time Comment    Discharge Stable Wed Mar 16, 2022 10:59 AM Barron Solis discharge to home/self care              Follow-up Information     Follow up With Specialties Details Why Contact Info Additional [facility] Schedule an appointment as soon as possible for a visit in 1 week for primary care and reevaluation and follow up on CT results [address] [place] [address], [address] [facility] [id number]  [address] [place] [address], [address] [place] [address] [address], [address] [place], [place], [address]          Discharge Medication List as of 3/16/2022 11:02 AM      START taking these medications    Details   ibuprofen (MOTRIN) 600 mg tablet Take 1 tablet (600 mg total) by mouth every 6 (six) hours as needed for mild pain for up to 10 days, Starting Wed 3/16/2022, Until Sat 3/26/2022 at 2359, Normal      lidocaine (LIDODERM) 5 % Apply 1 patch topically daily To right side chest wall at area of maximal tenderness/pain, Starting Wed 3/16/2022, Normal                 PDMP Review     None          ED Provider  Electronically Signed by           Nas Garcia MD  03/16/22 2585

## 2022-03-16 NOTE — Clinical Note
Abiola Huynh was seen and treated in our emergency department on 3/16/2022  Other - See Comments    No heavy lifting for 1 week  Diagnosis: Right chest wall contsuin    Edward    He may return on this date:     Needs follow up primary care for clearance  If you have any questions or concerns, please don't hesitate to call        Naina Shook MD    ______________________________           _______________          _______________  Hospital Representative                              Date                                Time

## 2022-04-06 ENCOUNTER — TELEPHONE (OUTPATIENT)
Dept: FAMILY MEDICINE CLINIC | Facility: CLINIC | Age: 22
End: 2022-04-06

## 2022-06-18 ENCOUNTER — APPOINTMENT (EMERGENCY)
Dept: RADIOLOGY | Facility: HOSPITAL | Age: 22
DRG: 420 | End: 2022-06-18
Payer: COMMERCIAL

## 2022-06-18 ENCOUNTER — HOSPITAL ENCOUNTER (INPATIENT)
Facility: HOSPITAL | Age: 22
LOS: 1 days | Discharge: LEFT BEFORE DISCHARGE | DRG: 420 | End: 2022-06-18
Attending: EMERGENCY MEDICINE | Admitting: INTERNAL MEDICINE
Payer: COMMERCIAL

## 2022-06-18 VITALS
DIASTOLIC BLOOD PRESSURE: 64 MMHG | TEMPERATURE: 98.8 F | RESPIRATION RATE: 16 BRPM | HEART RATE: 76 BPM | SYSTOLIC BLOOD PRESSURE: 98 MMHG | OXYGEN SATURATION: 97 %

## 2022-06-18 DIAGNOSIS — R73.9 HYPERGLYCEMIA: Primary | ICD-10-CM

## 2022-06-18 PROBLEM — R55 SYNCOPE: Status: ACTIVE | Noted: 2022-06-18

## 2022-06-18 LAB
ALBUMIN SERPL BCP-MCNC: 3.8 G/DL (ref 3.5–5)
ALP SERPL-CCNC: 117 U/L (ref 46–116)
ALT SERPL W P-5'-P-CCNC: 34 U/L (ref 12–78)
ANION GAP SERPL CALCULATED.3IONS-SCNC: 7 MMOL/L (ref 4–13)
AST SERPL W P-5'-P-CCNC: 30 U/L (ref 5–45)
ATRIAL RATE: 82 BPM
BASE EX.OXY STD BLDV CALC-SCNC: 76.8 % (ref 60–80)
BASE EXCESS BLDV CALC-SCNC: -1.8 MMOL/L
BASOPHILS # BLD AUTO: 0.03 THOUSANDS/ΜL (ref 0–0.1)
BASOPHILS NFR BLD AUTO: 1 % (ref 0–1)
BETA-HYDROXYBUTYRATE: 0.8 MMOL/L
BILIRUB SERPL-MCNC: 0.33 MG/DL (ref 0.2–1)
BILIRUB UR QL STRIP: NEGATIVE
BUN SERPL-MCNC: 15 MG/DL (ref 5–25)
CALCIUM SERPL-MCNC: 8.5 MG/DL (ref 8.3–10.1)
CHLORIDE SERPL-SCNC: 93 MMOL/L (ref 100–108)
CLARITY UR: CLEAR
CO2 SERPL-SCNC: 26 MMOL/L (ref 21–32)
COLOR UR: YELLOW
CREAT SERPL-MCNC: 0.98 MG/DL (ref 0.6–1.3)
EOSINOPHIL # BLD AUTO: 0.02 THOUSAND/ΜL (ref 0–0.61)
EOSINOPHIL NFR BLD AUTO: 0 % (ref 0–6)
ERYTHROCYTE [DISTWIDTH] IN BLOOD BY AUTOMATED COUNT: 11.8 % (ref 11.6–15.1)
GFR SERPL CREATININE-BSD FRML MDRD: 107 ML/MIN/1.73SQ M
GLUCOSE SERPL-MCNC: 432 MG/DL (ref 65–140)
GLUCOSE SERPL-MCNC: 729 MG/DL (ref 65–140)
GLUCOSE SERPL-MCNC: >500 MG/DL (ref 65–140)
GLUCOSE UR STRIP-MCNC: ABNORMAL MG/DL
HCO3 BLDV-SCNC: 24.2 MMOL/L (ref 24–30)
HCT VFR BLD AUTO: 40.9 % (ref 36.5–49.3)
HGB BLD-MCNC: 14.5 G/DL (ref 12–17)
HGB UR QL STRIP.AUTO: NEGATIVE
IMM GRANULOCYTES # BLD AUTO: 0.02 THOUSAND/UL (ref 0–0.2)
IMM GRANULOCYTES NFR BLD AUTO: 0 % (ref 0–2)
KETONES UR STRIP-MCNC: ABNORMAL MG/DL
LEUKOCYTE ESTERASE UR QL STRIP: NEGATIVE
LYMPHOCYTES # BLD AUTO: 1.03 THOUSANDS/ΜL (ref 0.6–4.47)
LYMPHOCYTES NFR BLD AUTO: 16 % (ref 14–44)
MCH RBC QN AUTO: 30.7 PG (ref 26.8–34.3)
MCHC RBC AUTO-ENTMCNC: 35.5 G/DL (ref 31.4–37.4)
MCV RBC AUTO: 87 FL (ref 82–98)
MONOCYTES # BLD AUTO: 0.54 THOUSAND/ΜL (ref 0.17–1.22)
MONOCYTES NFR BLD AUTO: 8 % (ref 4–12)
NEUTROPHILS # BLD AUTO: 4.94 THOUSANDS/ΜL (ref 1.85–7.62)
NEUTS SEG NFR BLD AUTO: 75 % (ref 43–75)
NITRITE UR QL STRIP: NEGATIVE
NRBC BLD AUTO-RTO: 0 /100 WBCS
O2 CT BLDV-SCNC: 15.5 ML/DL
P AXIS: 63 DEGREES
PCO2 BLDV: 45.4 MM HG (ref 42–50)
PH BLDV: 7.34 [PH] (ref 7.3–7.4)
PH UR STRIP.AUTO: 7 [PH] (ref 4.5–8)
PLATELET # BLD AUTO: 240 THOUSANDS/UL (ref 149–390)
PMV BLD AUTO: 10.7 FL (ref 8.9–12.7)
PO2 BLDV: 43.6 MM HG (ref 35–45)
POTASSIUM SERPL-SCNC: 4.1 MMOL/L (ref 3.5–5.3)
PR INTERVAL: 192 MS
PROT SERPL-MCNC: 6.9 G/DL (ref 6.4–8.2)
PROT UR STRIP-MCNC: NEGATIVE MG/DL
QRS AXIS: 98 DEGREES
QRSD INTERVAL: 90 MS
QT INTERVAL: 348 MS
QTC INTERVAL: 406 MS
RBC # BLD AUTO: 4.73 MILLION/UL (ref 3.88–5.62)
SODIUM SERPL-SCNC: 126 MMOL/L (ref 136–145)
SP GR UR STRIP.AUTO: 1.01 (ref 1–1.03)
T WAVE AXIS: 57 DEGREES
UROBILINOGEN UR QL STRIP.AUTO: 0.2 E.U./DL
VENTRICULAR RATE: 82 BPM
WBC # BLD AUTO: 6.58 THOUSAND/UL (ref 4.31–10.16)

## 2022-06-18 PROCEDURE — 99285 EMERGENCY DEPT VISIT HI MDM: CPT

## 2022-06-18 PROCEDURE — 71045 X-RAY EXAM CHEST 1 VIEW: CPT

## 2022-06-18 PROCEDURE — 93010 ELECTROCARDIOGRAM REPORT: CPT | Performed by: INTERNAL MEDICINE

## 2022-06-18 PROCEDURE — 85025 COMPLETE CBC W/AUTO DIFF WBC: CPT | Performed by: PHYSICIAN ASSISTANT

## 2022-06-18 PROCEDURE — 81003 URINALYSIS AUTO W/O SCOPE: CPT

## 2022-06-18 PROCEDURE — 36415 COLL VENOUS BLD VENIPUNCTURE: CPT | Performed by: PHYSICIAN ASSISTANT

## 2022-06-18 PROCEDURE — RECHECK: Performed by: INTERNAL MEDICINE

## 2022-06-18 PROCEDURE — 82805 BLOOD GASES W/O2 SATURATION: CPT | Performed by: PHYSICIAN ASSISTANT

## 2022-06-18 PROCEDURE — 99285 EMERGENCY DEPT VISIT HI MDM: CPT | Performed by: PHYSICIAN ASSISTANT

## 2022-06-18 PROCEDURE — 96374 THER/PROPH/DIAG INJ IV PUSH: CPT

## 2022-06-18 PROCEDURE — 93005 ELECTROCARDIOGRAM TRACING: CPT

## 2022-06-18 PROCEDURE — 80053 COMPREHEN METABOLIC PANEL: CPT | Performed by: PHYSICIAN ASSISTANT

## 2022-06-18 PROCEDURE — 96361 HYDRATE IV INFUSION ADD-ON: CPT

## 2022-06-18 PROCEDURE — 82010 KETONE BODYS QUAN: CPT | Performed by: PHYSICIAN ASSISTANT

## 2022-06-18 PROCEDURE — 82948 REAGENT STRIP/BLOOD GLUCOSE: CPT

## 2022-06-18 PROCEDURE — 99236 HOSP IP/OBS SAME DATE HI 85: CPT | Performed by: INTERNAL MEDICINE

## 2022-06-18 RX ORDER — SODIUM CHLORIDE 9 MG/ML
125 INJECTION, SOLUTION INTRAVENOUS CONTINUOUS
Status: CANCELLED | OUTPATIENT
Start: 2022-06-18

## 2022-06-18 RX ORDER — ONDANSETRON 2 MG/ML
4 INJECTION INTRAMUSCULAR; INTRAVENOUS ONCE
Status: COMPLETED | OUTPATIENT
Start: 2022-06-18 | End: 2022-06-18

## 2022-06-18 RX ORDER — INSULIN LISPRO 100 [IU]/ML
1-5 INJECTION, SOLUTION INTRAVENOUS; SUBCUTANEOUS
Status: CANCELLED | OUTPATIENT
Start: 2022-06-18

## 2022-06-18 RX ORDER — INSULIN GLARGINE 100 [IU]/ML
20 INJECTION, SOLUTION SUBCUTANEOUS
Status: CANCELLED | OUTPATIENT
Start: 2022-06-18

## 2022-06-18 RX ADMIN — ONDANSETRON 4 MG: 2 INJECTION INTRAMUSCULAR; INTRAVENOUS at 08:43

## 2022-06-18 RX ADMIN — SODIUM CHLORIDE 1000 ML: 0.9 INJECTION, SOLUTION INTRAVENOUS at 08:08

## 2022-06-18 RX ADMIN — SODIUM CHLORIDE 1000 ML: 0.9 INJECTION, SOLUTION INTRAVENOUS at 08:57

## 2022-06-18 RX ADMIN — INSULIN HUMAN 6 UNITS: 100 INJECTION, SOLUTION PARENTERAL at 10:49

## 2022-06-18 NOTE — ED NOTES
Patient states family emergency and needs to go  his sister and her kids  Patient states feeling better, has insulin at home, and will follow up with pcp   Patient understands risk of leaving and will go to nearest hospital or return here if symptoms worsen     Margot Manjarrez RN  06/18/22 0767

## 2022-06-18 NOTE — ED PROVIDER NOTES
History  Chief Complaint   Patient presents with    Dizziness     Pt reports an episode of dizziness at work today; patient reports headache; patient reports that he is a type 1 diabetic;      Shiloh Nj is a 22 yo M, history of DM1, presenting with generalized fatigue, weakness, "dizziness" described as "spinning" with standing, as well as hyperglycemia for the past day  He also notes nausea and 2 episodes of vomiting today  Reports blood glucose in the 200's until today when it significantly worsened  He reports being compliant with at home insulin regimen  Denies dietary changes  Denies recent illness or medication adjustments  History provided by:  Patient   used: Yes        Prior to Admission Medications   Prescriptions Last Dose Informant Patient Reported? Taking?   ibuprofen (MOTRIN) 600 mg tablet   No No   Sig: Take 1 tablet (600 mg total) by mouth every 6 (six) hours as needed for mild pain for up to 10 days   lidocaine (LIDODERM) 5 %   No No   Sig: Apply 1 patch topically daily To right side chest wall at area of maximal tenderness/pain      Facility-Administered Medications: None       Past Medical History:   Diagnosis Date    Diabetes mellitus ([facility] [address] )     Glaucoma     Subaortic membrane        Past Surgical History:   Procedure Laterality Date    CARDIAC SURGERY         History reviewed  No pertinent family history  I have reviewed and agree with the history as documented  E-Cigarette/Vaping     E-Cigarette/Vaping Substances     Social History     Tobacco Use    Smoking status: Never Smoker    Smokeless tobacco: Never Used   Substance Use Topics    Alcohol use: Not Currently    Drug use: Not Currently       Review of Systems   Constitutional: Positive for chills and fatigue  Negative for fever  HENT: Negative for congestion, rhinorrhea and sore throat  Eyes: Negative for pain and visual disturbance  Respiratory: Negative for cough, shortness of breath and wheezing  Cardiovascular: Negative for chest pain and palpitations  Gastrointestinal: Positive for nausea and vomiting  Negative for abdominal pain, blood in stool, constipation and diarrhea  Genitourinary: Negative for dysuria, frequency and urgency  Musculoskeletal: Negative for back pain, neck pain and neck stiffness  Skin: Negative for rash and wound  Neurological: Positive for dizziness and light-headedness  Negative for weakness and numbness  Physical Exam  Physical Exam  Constitutional:       General: He is not in acute distress  Appearance: He is well-developed  He is not toxic-appearing or diaphoretic  Comments: Slightly dry mucus membranes   HENT:      Head: Normocephalic and atraumatic  Right Ear: External ear normal       Left Ear: External ear normal    Eyes:      Conjunctiva/sclera: Conjunctivae normal       Pupils: Pupils are equal, round, and reactive to light  Cardiovascular:      Rate and Rhythm: Normal rate and regular rhythm  Heart sounds: Normal heart sounds  No murmur heard  No friction rub  No gallop  Pulmonary:      Effort: Pulmonary effort is normal  No respiratory distress  Breath sounds: Normal breath sounds  No wheezing  Abdominal:      General: There is no distension  Palpations: Abdomen is soft  Tenderness: There is no abdominal tenderness  There is no right CVA tenderness, left CVA tenderness, guarding or rebound  Musculoskeletal:      Cervical back: Normal range of motion and neck supple  Lymphadenopathy:      Cervical: No cervical adenopathy  Skin:     General: Skin is warm and dry  Capillary Refill: Capillary refill takes 2 to 3 seconds  Findings: No erythema or rash  Neurological:      Mental Status: He is alert and oriented to person, place, and time  Motor: No abnormal muscle tone  Coordination: Coordination normal    Psychiatric:         Behavior: Behavior normal          Thought Content:  Thought content normal          Judgment: Judgment normal          Vital Signs  ED Triage Vitals   Temperature Pulse Respirations Blood Pressure SpO2   06/18/22 0746 06/18/22 0741 06/18/22 0741 06/18/22 0741 06/18/22 0741   98 8 °F (37 1 °C) 92 20 127/75 97 %      Temp Source Heart Rate Source Patient Position - Orthostatic VS BP Location FiO2 (%)   06/18/22 0746 06/18/22 1029 06/18/22 1029 06/18/22 1029 --   Oral Monitor Lying Right arm       Pain Score       06/18/22 1029       No Pain           Vitals:    06/18/22 0741 06/18/22 1029   BP: 127/75 98/64   Pulse: 92 76   Patient Position - Orthostatic VS:  Lying         Visual Acuity      ED Medications  Medications   ondansetron (ZOFRAN) injection 4 mg (4 mg Intravenous Given 6/18/22 0843)   sodium chloride 0 9 % bolus 1,000 mL (0 mL Intravenous Stopped 6/18/22 0857)   sodium chloride 0 9 % bolus 1,000 mL (0 mL Intravenous Stopped 6/18/22 0942)   insulin regular (HumuLIN R,NovoLIN R) injection 6 Units (6 Units Subcutaneous Given 6/18/22 1049)       Diagnostic Studies  Results Reviewed     Procedure Component Value Units Date/Time    Fingerstick Glucose (POCT) [[id number]]  (Abnormal) Collected: 06/18/22 0952    Lab Status: Final result Updated: 06/18/22 0953     POC Glucose 432 mg/dl     Urine Macroscopic, POC [[id number]]  (Abnormal) Collected: 06/18/22 0902    Lab Status: Final result Specimen: Urine Updated: 06/18/22 0904     Color, UA Yellow     Clarity, UA Clear     pH, UA 7 0     Leukocytes, UA Negative     Nitrite, UA Negative     Protein, UA Negative mg/dl      Glucose,  (1/2%) mg/dl      Ketones, UA Trace mg/dl      Urobilinogen, UA 0 2 E U /dl      Bilirubin, UA Negative     Blood, UA Negative     Specific Gravity, UA 1 015    Narrative:      CLINITEK RESULT    Comprehensive metabolic panel [[id number]]  (Abnormal) Collected: 06/18/22 0808    Lab Status: Final result Specimen: Blood from Arm, Right Updated: 06/18/22 0854     Sodium 126 mmol/L      Potassium 4 1 mmol/L      Chloride 93 mmol/L      CO2 26 mmol/L      ANION GAP 7 mmol/L      BUN 15 mg/dL      Creatinine 0 98 mg/dL      Glucose 729 mg/dL      Calcium 8 5 mg/dL      AST 30 U/L      ALT 34 U/L      Alkaline Phosphatase 117 U/L      Total Protein 6 9 g/dL      Albumin 3 8 g/dL      Total Bilirubin 0 33 mg/dL      eGFR 107 ml/min/1 73sq m     Narrative:      National Kidney Disease Foundation guidelines for Chronic Kidney Disease (CKD):     Stage 1 with normal or high GFR (GFR > 90 mL/min/1 73 square meters)    Stage 2 Mild CKD (GFR = 60-89 mL/min/1 73 square meters)    Stage 3A Moderate CKD (GFR = 45-59 mL/min/1 73 square meters)    Stage 3B Moderate CKD (GFR = 30-44 mL/min/1 73 square meters)    Stage 4 Severe CKD (GFR = 15-29 mL/min/1 73 square meters)    Stage 5 End Stage CKD (GFR <15 mL/min/1 73 square meters)  Note: GFR calculation is accurate only with a steady state creatinine    Beta Hydroxybutyrate [[id number]]  (Abnormal) Collected: 06/18/22 0808    Lab Status: Final result Specimen: Blood from Arm, Right Updated: 06/18/22 0840     BETA-HYDROXYBUTYRATE 0 8 mmol/L     Blood gas, venous [[id number]] Collected: 06/18/22 0808    Lab Status: Final result Specimen: Blood from Arm, Right Updated: 06/18/22 0817     pH, Oli 7 344     pCO2, Oli 45 4 mm Hg      pO2, Oli 43 6 mm Hg      HCO3, Oli 24 2 mmol/L      Base Excess, Oli -1 8 mmol/L      O2 Content, Oli 15 5 ml/dL      O2 HGB, VENOUS 76 8 %     CBC and differential [[id number]] Collected: 06/18/22 0808    Lab Status: Final result Specimen: Blood from Arm, Right Updated: 06/18/22 0814     WBC 6 58 Thousand/uL      RBC 4 73 Million/uL      Hemoglobin 14 5 g/dL      Hematocrit 40 9 %      MCV 87 fL      MCH 30 7 pg      MCHC 35 5 g/dL      RDW 11 8 %      MPV 10 7 fL      Platelets 438 Thousands/uL      nRBC 0 /100 WBCs      Neutrophils Relative 75 %      Immat GRANS % 0 %      Lymphocytes Relative 16 %      Monocytes Relative 8 %      Eosinophils Relative 0 %      Basophils Relative 1 %      Neutrophils Absolute 4 94 Thousands/µL      Immature Grans Absolute 0 02 Thousand/uL      Lymphocytes Absolute 1 03 Thousands/µL      Monocytes Absolute 0 54 Thousand/µL      Eosinophils Absolute 0 02 Thousand/µL      Basophils Absolute 0 03 Thousands/µL     Fingerstick Glucose (POCT) [[id number]]  (Abnormal) Collected: 06/18/22 0744    Lab Status: Final result Updated: 06/18/22 0746     POC Glucose >500 mg/dl                  XR chest 1 view portable    (Results Pending)              Procedures  ECG 12 Lead Documentation Only    Date/Time: 6/18/2022 8:15 AM  Performed by: Mirna Akhtar PA-C  Authorized by: Mirna Akhtar PA-C     Indications / Diagnosis:  Weakness  ECG reviewed by me, the ED Provider: yes    Patient location:  ED  Previous ECG:     Previous ECG:  Unavailable    Comparison to cardiac monitor: Yes    Interpretation:     Interpretation: normal    Rate:     ECG rate:  82    ECG rate assessment: normal    Rhythm:     Rhythm: sinus rhythm    Ectopy:     Ectopy: none    QRS:     QRS axis:  Right    QRS intervals:  Normal  Conduction:     Conduction: normal    ST segments:     ST segments:  Normal  T waves:     T waves: normal               ED Course                                             MDM  Number of Diagnoses or Management Options  Hyperglycemia  Diagnosis management comments: Generalized weakness, fatigue, dizziness with standing as well as two episodes of vomiting this am in setting of hyperglycemia  Initial glucose >500 here  Will check labs including CBC for WBC count, CMP for electrolytes, glucose, beta hydroxybutarate/VBG's for DKA/acidosis, urine dip for ketones/UTI  Will provide IV fluid hydration given likely dehydration, re-check glucose  Disposition pending ED workup   --------------------------------  Pt with improvement in glucose with IV fluids as well as some symptomatic improvement   He was initially agreeable to admission however prior to admission decided to leave hospital AMA due to family emergency  I did not personally speak to the patient prior to him leaving but follow up information and return precautions reviewed by RN  Amount and/or Complexity of Data Reviewed  Clinical lab tests: ordered and reviewed  Tests in the radiology section of CPT®: ordered and reviewed    Patient Progress  Patient progress: stable      Disposition  Final diagnoses:   Hyperglycemia     Time reflects when diagnosis was documented in both MDM as applicable and the Disposition within this note     Time User Action Codes Description Comment    6/18/2022 12:18 PM Abel Case Add [R73 9] Hyperglycemia       ED Disposition     ED Disposition   AMA    Condition   --    Date/Time   Sat Jun 18, 2022 10:59 AM    Comment   Date: 6/18/2022  Patient: Nataly Kaur  Admitted: 6/18/2022  7:38 AM  Attending Provider: Ella Moore MD    Nataly Kaur or his authorized caregiver has made the decision for the patient to leave the emergency department against the  advice of Dr Panda Rodriguez  He or his authorized caregiver has been informed and understands the inherent risks, including death  He or his authorized caregiver has decided to accept the responsibility for this decision  Nataly Kaur and all [facility] parties have been advised that he may return for further evaluation or treatment  His condition at time of discharge was improving with fluids and insulin    Nataly Kaur had current vital signs as follows:  BP 98/64 (BP Location: Right a rm)   Pulse 76   Temp 98 8 °F (37 1 °C) (Oral)   Resp 16            Follow-up Information    None         Discharge Medication List as of 6/18/2022 11:10 AM      CONTINUE these medications which have NOT CHANGED    Details   ibuprofen (MOTRIN) 600 mg tablet Take 1 tablet (600 mg total) by mouth every 6 (six) hours as needed for mild pain for up to 10 days, Starting Wed 3/16/2022, Until Sat 3/26/2022 at 2359, Normal      lidocaine (LIDODERM) 5 % Apply 1 patch topically daily To right side chest wall at area of maximal tenderness/pain, Starting Wed 3/16/2022, Normal             No discharge procedures on file      PDMP Review     None          ED Provider  Electronically Signed by           Zulma Crabtree PA-C  06/18/22 5625

## 2022-06-18 NOTE — H&P
[address] [facility] 2000, 23 y o  male MRN: [id number]  Unit/Bed#: ED 16 Encounter: [id number]  Primary Care Provider: No primary care provider on file  Date and time admitted to hospital: 6/18/2022  7:38 AM    * Hyperglycemia  Assessment & Plan  · Patient presented with syncope found to have elevated blood glucose greater than 700  · No evidence of anion gap  · Patient glucose improved to 400 after IV fluids  · Likely secondary to noncompliance with regimen at home  · Will resume patient's home insulin long-acting 20 units at bedtime monitor Accu-Cheks, sliding scale for coverage  · Will recheck hemoglobin A1c    Syncope  Assessment & Plan  · Patient presented with syncope  · Possibly vasovagal secondary to dehydration and poor appetite  · Continue with gentle IV fluids        VTE Prophylaxis: low risk, early ambulation  /    Code Status: Full  POLST: There is no POLST form on file for this patient (pre-hospital)    Anticipated Length of Stay:  Patient will be admitted on an Inpatient basis with an anticipated length of stay of  Greater than 2 midnights  Justification for Hospital Stay: hyperglycemia    Total Time for Visit, including Counseling / Coordination of Care: 40 minutes  Greater than 50% of this total time spent on direct patient counseling and coordination of care  Chief Complaint:   Dizziness, syncope    History of Present Illness:    Mike Jay is a 23 y o  male who presents with syncope at home  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 weak and dizzy and had episode of loss of consciousness  Denies any chest pain, palpitations, dyspnea  Patient states he has had a poor appetite for the past several weeks  Has polyuria and polydipsia  States he takes his insulin but does not comply with recommendations from his endocrinologist at [address] [place] [address] [address]  Denies any nausea, vomiting, diarrhea, fever, chills      Review of Systems:    Review of Systems   Constitutional: Negative  Negative for appetite change  HENT: Negative  Eyes: Negative  Respiratory: Negative  Cardiovascular: Negative  Gastrointestinal: Negative  Endocrine: Positive for polydipsia and polyuria  Genitourinary: Negative  Musculoskeletal: Negative  Skin: Negative  Allergic/Immunologic: Negative  Neurological: Positive for syncope  Hematological: Negative  Psychiatric/Behavioral: Negative  Past Medical and Surgical History:     Past Medical History:   Diagnosis Date    Diabetes mellitus ([address] )     Glaucoma     Subaortic membrane        Past Surgical History:   Procedure Laterality Date    CARDIAC SURGERY         Meds/Allergies:    Prior to Admission medications    Medication Sig Start Date End Date Taking? Authorizing Provider   ibuprofen (MOTRIN) 600 mg tablet Take 1 tablet (600 mg total) by mouth every 6 (six) hours as needed for mild pain for up to 10 days 3/16/22 3/26/22  Karson Brambila MD   lidocaine (LIDODERM) 5 % Apply 1 patch topically daily To right side chest wall at area of maximal tenderness/pain 3/16/22   Karson Brambila MD     I have reviewed home medications with patient personally  Allergies: No Known Allergies    Social History:     Social History     Substance and Sexual Activity   Alcohol Use Not Currently     Social History     Tobacco Use   Smoking Status Never Smoker   Smokeless Tobacco Never Used     Social History     Substance and Sexual Activity   Drug Use Not Currently       Family History:    History reviewed  No pertinent family history      Physical Exam:     Vitals:   Blood Pressure: 98/64 (06/18/22 1029)  Pulse: 76 (06/18/22 1029)  Temperature: 98 8 °F (37 1 °C) (06/18/22 0746)  Temp Source: Oral (06/18/22 0746)  Respirations: 16 (06/18/22 1029)  SpO2: 97 % (06/18/22 1029)    Constitutional: Patient is oriented to person, place and time, no acute distress  HEENT:  Normocephalic, atraumatic  Cardiovascular: Normal S1S2, RRR, No murmurs/rubs/gallops appreciated  Pulmonary:  Bilateral air entry, No rhonchi/rales/wheezing appreciated  Abdominal: Soft, Bowel sounds present, Non-tender, Non-distended  Extremities:  No cyanosis, clubbing or edema  Neurological: Cranial nerves II-XII grossly intact, sensation intact, otherwise no focal neurological symptoms  Additional Data:     Lab Results: I have personally reviewed pertinent reports  Results from last 7 days   Lab Units 06/18/22  0808   WBC Thousand/uL 6 58   HEMOGLOBIN g/dL 14 5   HEMATOCRIT % 40 9   PLATELETS Thousands/uL 240   NEUTROS PCT % 75   LYMPHS PCT % 16   MONOS PCT % 8   EOS PCT % 0     Results from last 7 days   Lab Units 06/18/22  0808   POTASSIUM mmol/L 4 1   CHLORIDE mmol/L 93*   CO2 mmol/L 26   BUN mg/dL 15   CREATININE mg/dL 0 98   CALCIUM mg/dL 8 5   ALK PHOS U/L 117*   ALT U/L 34   AST U/L 30           Imaging: I have personally reviewed pertinent reports  No results found  [facility] Records Reviewed: Yes     ** Please Note: This note has been constructed using a voice recognition system   **

## 2022-06-18 NOTE — DISCHARGE SUMMARY
[address] [facility]  Discharge- Toy Pupa 2000, 23 y o  male MRN: [id number]  Unit/Bed#: ED 16 Encounter: [id number]  Primary Care Provider: No primary care provider on file  Date and time admitted to hospital: 6/18/2022  7:38 AM    * Hyperglycemia  Assessment & Plan  · Patient presented with syncope found to have elevated blood glucose greater than 700  · No evidence of anion gap  · Patient glucose improved to 400 after IV fluids  · Likely secondary to noncompliance with regimen at home  · Will resume patient's home insulin long-acting 20 units at bedtime monitor Accu-Cheks, sliding scale for coverage  · Will recheck hemoglobin A1c    Syncope  Assessment & Plan  · Patient presented with syncope  · Possibly vasovagal secondary to dehydration and poor appetite  · Continue with gentle IV fluids        Transition of Care Discharge Summary - [place] [address] [facility]    Patient Information: Aaron Coon 23 y o  male MRN: [id number]  Unit/Bed#: ED 16 Encounter: [id number]    Discharging Physician / Practitioner: Kiki Song MD  PCP: No primary care provider on file  Admission Date: 6/18/2022  Discharge Date: 06/18/22    Disposition:      Patient signed out against medical advice      Reason for Admission: dizziness, syncope    Discharge Diagnoses:     Principal Problem:    Hyperglycemia  Active Problems:    Syncope  Resolved Problems:    * No resolved hospital problems  *      Consultations During Hospital Stay:  · None      Hospital Course:     Aaron Coon is a 23 y o  male patient who originally presented to the hospital on 6/18/2022 due to dizziness and syncope  Was found to have hypoglycemia without anion gap  Patient was admitted and started on IV fluids and insulin  Shortly after patient left against medical advice was not evaluated by myself to explain risk of leaving without proper medical care  Please see above problem list for further details  Condition at Discharge: good     Discharge instructions/Information to patient and family:   See after visit summary section titled Discharge Instructions for information provided to patient and family  Planned Readmission: no     Discharge Statement:  I spent 35 minutes discharging the patient  This time was spent on the day of discharge  I had direct contact with the patient on the day of discharge  Greater than 50% of the total time was spent examining patient, answering all patient questions, arranging and discussing plan of care with patient as well as directly providing post-discharge instructions  Additional time then spent on discharge activities      ** Please Note: This note has been constructed using a voice recognition system **

## 2022-06-18 NOTE — ASSESSMENT & PLAN NOTE
· Patient presented with syncope found to have elevated blood glucose greater than 700  · No evidence of anion gap  · Patient glucose improved to 400 after IV fluids  · Likely secondary to noncompliance with regimen at home  · Will resume patient's home insulin long-acting 20 units at bedtime monitor Accu-Cheks, sliding scale for coverage  · Will recheck hemoglobin A1c

## 2022-06-18 NOTE — Clinical Note
Nelida Kawasaki was seen and treated in our emergency department on 6/18/2022  Diagnosis:     Merna  may return to work on return date  He may return on this date: 06/19/2022         If you have any questions or concerns, please don't hesitate to call        Bridgette Santiago RN    ______________________________           _______________          _______________  Hospital Representative                              Date                                Time

## 2022-06-18 NOTE — ASSESSMENT & PLAN NOTE
· Patient presented with syncope  · Possibly vasovagal secondary to dehydration and poor appetite  · Continue with gentle IV fluids

## 2022-06-20 NOTE — UTILIZATION REVIEW
Initial Clinical Review    Admission: Date/Time/Statement:   Admission Orders (From admission, onward)     Ordered        06/18/22 1034  Inpatient Admission  Once                      Orders Placed This Encounter   Procedures    Inpatient Admission     Standing Status:   Standing     Number of Occurrences:   1     Order Specific Question:   Level of Care     Answer:   Med Surg [16]     Order Specific Question:   Estimated length of stay     Answer:   More than 2 Midnights     Order Specific Question:   Certification     Answer:   I certify that inpatient services are medically necessary for this patient for a duration of greater than two midnights  See H&P and MD Progress Notes for additional information about the patient's course of treatment  ED Arrival Information     Expected   -    Arrival   6/18/2022 07:36    Acuity   Urgent            Means of arrival   Walk-In    Escorted by   Self    Service   Hospitalist    Admission type   Urgent            Arrival complaint   dizzy           Chief Complaint   Patient presents with    Dizziness     Pt reports an episode of dizziness at work today; patient reports headache; patient reports that he is a type 1 diabetic; Initial Presentation: 23 y o  male presents to ED from home with syncope  Works [employer], felt weak, dizzy and had an episode of  LOC  Appetite poor for past  Several weeks  Has polyuria and polydipsia  PMH  Is  Diabetes  States he takes his insulin  But does not  Comply with endocrine recommendations  Found with blood sugar > 700 , improved to  400 after IVF  No evidence of anion gap  Admit  Ip with Hyperglycemia, likely due to noncompliance and  Syncope and plan is  Gentle IVF, monitor labs,  SSI and accu checks        6/18      11:10 AM  PATIENT  SIGNED AMA      ED Triage Vitals   Temperature Pulse Respirations Blood Pressure SpO2   06/18/22 0746 06/18/22 0741 06/18/22 0741 06/18/22 0741 06/18/22 0741   98 8 °F (37 1 °C) 92 20 127/75 97 %      Temp Source Heart Rate Source Patient Position - Orthostatic VS BP Location FiO2 (%)   06/18/22 0746 06/18/22 1029 06/18/22 1029 06/18/22 1029 --   Oral Monitor Lying Right arm       Pain Score       06/18/22 1029       No Pain          Wt Readings from Last 1 Encounters:   03/16/22 60 6 kg (133 lb 9 6 oz)     Additional Vital Signs:    98 8-76-16      98/64      sats  97  % RA  Pertinent Labs/Diagnostic Test Results:   XR chest 1 view portable   Final Result by Edelmira Banks MD (06/18 1713)      No acute cardiopulmonary disease                    Workstation performed: [id number]               Results from last 7 days   Lab Units 06/18/22  0808   WBC Thousand/uL 6 58   HEMOGLOBIN g/dL 14 5   HEMATOCRIT % 40 9   PLATELETS Thousands/uL 240   NEUTROS ABS Thousands/µL 4 94         Results from last 7 days   Lab Units 06/18/22  0808   SODIUM mmol/L 126*   POTASSIUM mmol/L 4 1   CHLORIDE mmol/L 93*   CO2 mmol/L 26   ANION GAP mmol/L 7   BUN mg/dL 15   CREATININE mg/dL 0 98   EGFR ml/min/1 73sq m 107   CALCIUM mg/dL 8 5     Results from last 7 days   Lab Units 06/18/22  0808   AST U/L 30   ALT U/L 34   ALK PHOS U/L 117*   TOTAL PROTEIN g/dL 6 9   ALBUMIN g/dL 3 8   TOTAL BILIRUBIN mg/dL 0 33     Results from last 7 days   Lab Units 06/18/22  0952 06/18/22  0744   POC GLUCOSE mg/dl 432* >500*     Results from last 7 days   Lab Units 06/18/22  0808   GLUCOSE RANDOM mg/dL 729*             BETA-HYDROXYBUTYRATE   Date Value Ref Range Status   06/18/2022 0 8 (H) <0 6 mmol/L Final          Results from last 7 days   Lab Units 06/18/22  0808   PH ROSALIE  7 344   PCO2 ROSALIE mm Hg 45 4   PO2 ROSALIE mm Hg 43 6   HCO3 ROSALIE mmol/L 24 2   BASE EXC ROSALIE mmol/L -1 8   O2 CONTENT ROSALIE ml/dL 15 5   O2 HGB, VENOUS % 76 8                   Results from last 7 days   Lab Units 06/18/22  0902   CLARITY UA  Clear   COLOR UA  Yellow   SPEC GRAV UA  1 015   PH UA  7 0   GLUCOSE UA mg/dl 500 (1/2%)*   KETONES UA mg/dl Trace*   BLOOD UA Negative   PROTEIN UA mg/dl Negative   NITRITE UA  Negative   BILIRUBIN UA  Negative   UROBILINOGEN UA E U /dl 0 2   LEUKOCYTES UA  Negative         ED Treatment:   Medication Administration from 06/18/2022 0735 to 06/20/2022 1543       Date/Time Order Dose Route Action Comments     06/18/2022 0843 ondansetron (ZOFRAN) injection 4 mg 4 mg Intravenous Given      06/18/2022 0857 sodium chloride 0 9 % bolus 1,000 mL 0 mL Intravenous Stopped      06/18/2022 0808 sodium chloride 0 9 % bolus 1,000 mL 1,000 mL Intravenous New Bag      06/18/2022 0942 sodium chloride 0 9 % bolus 1,000 mL 0 mL Intravenous Stopped      06/18/2022 0857 sodium chloride 0 9 % bolus 1,000 mL 1,000 mL Intravenous New Bag      06/18/2022 1049 insulin regular (HumuLIN R,NovoLIN R) injection 6 Units 6 Units Subcutaneous Given         Admitting Diagnosis: Dizzy spells [R42]  Age/Sex: 23 y o  male  Admission Orders:  Scheduled Medications:          Continuous IV Infusions:          PRN Meds:    Network Utilization Review Department  ATTENTION: Please call with any questions or concerns to [phone] and carefully listen to the prompts so that you are directed to the right person  All voicemails are confidential   [employer] all requests for admission clinical reviews, approved or denied determinations and any other requests to dedicated fax number below belonging to the campus where the patient is receiving treatment   List of dedicated fax numbers for the Facilities:  [address] DENIALS (Administrative/Medical Necessity) [phone]   [address] (Maternity/NICU/Pediatrics) [phone]   [address] [place] [address]  [address] Medical [place] [address] [place] [address] [address] [facility] [phone]

## 2022-06-23 ENCOUNTER — HOSPITAL ENCOUNTER (EMERGENCY)
Facility: HOSPITAL | Age: 22
Discharge: HOME | End: 2022-06-23
Attending: EMERGENCY MEDICINE

## 2022-06-23 VITALS
RESPIRATION RATE: 19 BRPM | OXYGEN SATURATION: 100 % | HEART RATE: 84 BPM | TEMPERATURE: 98.7 F | WEIGHT: 132.06 LBS | SYSTOLIC BLOOD PRESSURE: 93 MMHG | DIASTOLIC BLOOD PRESSURE: 58 MMHG

## 2022-06-23 DIAGNOSIS — R73.9 HYPERGLYCEMIA: Primary | ICD-10-CM

## 2022-06-23 LAB
ANION GAP SERPL CALCULATED.3IONS-SCNC: 9 MMOL/L (ref 4–13)
ATRIAL RATE: 93 BPM
BACTERIA UR QL AUTO: NORMAL /HPF
BASE EX.OXY STD BLDV CALC-SCNC: 84.8 % (ref 60–80)
BASE EXCESS BLDV CALC-SCNC: -1.9 MMOL/L
BASOPHILS # BLD AUTO: 0.03 THOUSANDS/ΜL (ref 0–0.1)
BASOPHILS NFR BLD AUTO: 1 % (ref 0–1)
BILIRUB UR QL STRIP: NEGATIVE
BILIRUB UR QL STRIP: NEGATIVE
BUN SERPL-MCNC: 13 MG/DL (ref 5–25)
CALCIUM SERPL-MCNC: 9 MG/DL (ref 8.3–10.1)
CHLORIDE SERPL-SCNC: 95 MMOL/L (ref 100–108)
CLARITY UR: CLEAR
CLARITY UR: CLEAR
CO2 SERPL-SCNC: 28 MMOL/L (ref 21–32)
COLOR UR: YELLOW
COLOR UR: YELLOW
CREAT SERPL-MCNC: 0.91 MG/DL (ref 0.6–1.3)
EOSINOPHIL # BLD AUTO: 0.02 THOUSAND/ΜL (ref 0–0.61)
EOSINOPHIL NFR BLD AUTO: 0 % (ref 0–6)
ERYTHROCYTE [DISTWIDTH] IN BLOOD BY AUTOMATED COUNT: 11.7 % (ref 11.6–15.1)
GFR SERPL CREATININE-BSD FRML MDRD: 117 ML/MIN/1.73SQ M
GLUCOSE SERPL-MCNC: 255 MG/DL (ref 65–140)
GLUCOSE SERPL-MCNC: 577 MG/DL (ref 65–140)
GLUCOSE SERPL-MCNC: >500 MG/DL (ref 65–140)
GLUCOSE UR STRIP-MCNC: ABNORMAL MG/DL
GLUCOSE UR STRIP-MCNC: ABNORMAL MG/DL
HCO3 BLDV-SCNC: 23.9 MMOL/L (ref 24–30)
HCT VFR BLD AUTO: 44.4 % (ref 36.5–49.3)
HGB BLD-MCNC: 15.8 G/DL (ref 12–17)
HGB UR QL STRIP.AUTO: NEGATIVE
HGB UR QL STRIP.AUTO: NEGATIVE
IMM GRANULOCYTES # BLD AUTO: 0.02 THOUSAND/UL (ref 0–0.2)
IMM GRANULOCYTES NFR BLD AUTO: 0 % (ref 0–2)
KETONES UR STRIP-MCNC: ABNORMAL MG/DL
KETONES UR STRIP-MCNC: ABNORMAL MG/DL
LEUKOCYTE ESTERASE UR QL STRIP: ABNORMAL
LEUKOCYTE ESTERASE UR QL STRIP: ABNORMAL
LYMPHOCYTES # BLD AUTO: 1.33 THOUSANDS/ΜL (ref 0.6–4.47)
LYMPHOCYTES NFR BLD AUTO: 21 % (ref 14–44)
MCH RBC QN AUTO: 29.9 PG (ref 26.8–34.3)
MCHC RBC AUTO-ENTMCNC: 35.6 G/DL (ref 31.4–37.4)
MCV RBC AUTO: 84 FL (ref 82–98)
MONOCYTES # BLD AUTO: 0.46 THOUSAND/ΜL (ref 0.17–1.22)
MONOCYTES NFR BLD AUTO: 7 % (ref 4–12)
NEUTROPHILS # BLD AUTO: 4.63 THOUSANDS/ΜL (ref 1.85–7.62)
NEUTS SEG NFR BLD AUTO: 71 % (ref 43–75)
NITRITE UR QL STRIP: NEGATIVE
NITRITE UR QL STRIP: NEGATIVE
NON-SQ EPI CELLS URNS QL MICRO: NORMAL /HPF
NRBC BLD AUTO-RTO: 0 /100 WBCS
O2 CT BLDV-SCNC: 18.9 ML/DL
OTHER STN SPEC: NORMAL
P AXIS: 61 DEGREES
PCO2 BLDV: 44.5 MM HG (ref 42–50)
PH BLDV: 7.35 [PH] (ref 7.3–7.4)
PH UR STRIP.AUTO: 6.5 [PH]
PH UR STRIP.AUTO: 6.5 [PH] (ref 4.5–8)
PLATELET # BLD AUTO: 298 THOUSANDS/UL (ref 149–390)
PMV BLD AUTO: 10.6 FL (ref 8.9–12.7)
PO2 BLDV: 52.1 MM HG (ref 35–45)
POTASSIUM SERPL-SCNC: 4.5 MMOL/L (ref 3.5–5.3)
PR INTERVAL: 170 MS
PROT UR STRIP-MCNC: NEGATIVE MG/DL
PROT UR STRIP-MCNC: NEGATIVE MG/DL
QRS AXIS: 90 DEGREES
QRSD INTERVAL: 92 MS
QT INTERVAL: 350 MS
QTC INTERVAL: 435 MS
RBC # BLD AUTO: 5.28 MILLION/UL (ref 3.88–5.62)
RBC #/AREA URNS AUTO: NORMAL /HPF
SODIUM SERPL-SCNC: 132 MMOL/L (ref 136–145)
SP GR UR STRIP.AUTO: 1.01 (ref 1–1.03)
SP GR UR STRIP.AUTO: 1.01 (ref 1–1.03)
T WAVE AXIS: 44 DEGREES
UROBILINOGEN UR QL STRIP.AUTO: 0.2 E.U./DL
UROBILINOGEN UR QL STRIP.AUTO: 0.2 E.U./DL
VENTRICULAR RATE: 93 BPM
WBC # BLD AUTO: 6.49 THOUSAND/UL (ref 4.31–10.16)
WBC #/AREA URNS AUTO: NORMAL /HPF

## 2022-06-23 PROCEDURE — 93005 ELECTROCARDIOGRAM TRACING: CPT

## 2022-06-23 PROCEDURE — 96365 THER/PROPH/DIAG IV INF INIT: CPT

## 2022-06-23 PROCEDURE — 82948 REAGENT STRIP/BLOOD GLUCOSE: CPT

## 2022-06-23 PROCEDURE — 99284 EMERGENCY DEPT VISIT MOD MDM: CPT | Performed by: EMERGENCY MEDICINE

## 2022-06-23 PROCEDURE — 96372 THER/PROPH/DIAG INJ SC/IM: CPT

## 2022-06-23 PROCEDURE — 93010 ELECTROCARDIOGRAM REPORT: CPT | Performed by: INTERNAL MEDICINE

## 2022-06-23 PROCEDURE — 36415 COLL VENOUS BLD VENIPUNCTURE: CPT | Performed by: EMERGENCY MEDICINE

## 2022-06-23 PROCEDURE — 80048 BASIC METABOLIC PNL TOTAL CA: CPT | Performed by: EMERGENCY MEDICINE

## 2022-06-23 PROCEDURE — 99284 EMERGENCY DEPT VISIT MOD MDM: CPT

## 2022-06-23 PROCEDURE — 82805 BLOOD GASES W/O2 SATURATION: CPT | Performed by: EMERGENCY MEDICINE

## 2022-06-23 PROCEDURE — 85025 COMPLETE CBC W/AUTO DIFF WBC: CPT | Performed by: EMERGENCY MEDICINE

## 2022-06-23 PROCEDURE — 81001 URINALYSIS AUTO W/SCOPE: CPT | Performed by: EMERGENCY MEDICINE

## 2022-06-23 PROCEDURE — 96366 THER/PROPH/DIAG IV INF ADDON: CPT

## 2022-06-23 RX ORDER — INSULIN DEGLUDEC INJECTION 100 U/ML
20 INJECTION, SOLUTION SUBCUTANEOUS DAILY
COMMUNITY
Start: 2022-04-18

## 2022-06-23 RX ORDER — INSULIN LISPRO-AABC 100 [IU]/ML
8 INJECTION, SOLUTION SUBCUTANEOUS 3 TIMES DAILY
COMMUNITY
Start: 2022-04-18 | End: 2023-04-18

## 2022-06-23 RX ADMIN — SODIUM CHLORIDE, SODIUM LACTATE, POTASSIUM CHLORIDE, AND CALCIUM CHLORIDE 1000 ML: .6; .31; .03; .02 INJECTION, SOLUTION INTRAVENOUS at 09:31

## 2022-06-23 RX ADMIN — INSULIN HUMAN 10 UNITS: 100 INJECTION, SOLUTION PARENTERAL at 09:32

## 2022-06-23 RX ADMIN — SODIUM CHLORIDE, SODIUM LACTATE, POTASSIUM CHLORIDE, AND CALCIUM CHLORIDE 1000 ML: .6; .31; .03; .02 INJECTION, SOLUTION INTRAVENOUS at 08:49

## 2022-06-23 NOTE — Clinical Note
Millie Buckley was seen and treated in our emergency department on 6/23/2022  Diagnosis:     Shiloh Nj  may return to work on return date  He may return on this date: 06/24/2022         If you have any questions or concerns, please don't hesitate to call        Inez Robertson RN    ______________________________           _______________          _______________  Hospital Representative                              Date                                Time

## 2022-06-23 NOTE — CASE MANAGEMENT
Case Management ED Progress Note    Patient name Clarisa Gardiner  Location ED 15/ED 15 MRN [id number]  : 2000 Date 2022        OBJECTIVE:  Predictive Model Details         50% Factor Value    Risk of Hospital Admission or ED Visit Model Number of ED Visits 4     Is in Relationship No     Has Diabetes Yes            Chief Complaint: Dizziness   Patient Class: Emergency  Preferred Pharmacy:   [facility] [address] [facility], [place] [address] [place] [address] [address] [facility]  Phone: [phone] Fax: [phone]    Primary Care Provider: No primary care provider on file  Primary Insurance:   Secondary Insurance:     ED Progress Note:    Patient returns to ED reporting dizzy spells, blurred vision  Patient mentioned is not taking meds as prescribed,, unable to administer insulin at work, having no proper storage  Case consulted with pharmacist and patient was instructed to use a "cooler bag' to keep his insulin wich can be good for 28 days at room temperature  Patient informs this worker 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

## 2022-06-23 NOTE — ED PROVIDER NOTES
History  Chief Complaint   Patient presents with    Dizziness     Pt reports dizziness, blurred vision, hx of diabetes no insulin today     78-year-old male, history of insulin-dependent diabetes, presenting with dizziness  Patient states he was at work when symptoms began  States he is not taking his insulin since yesterday, has no where to store while he is at work  History provided by:  Patient   used: Yes    Dizziness  Quality:  Lightheadedness  Severity:  Moderate  Onset quality:  Gradual  Duration:  1 day  Timing:  Constant  Progression:  Unchanged  Chronicity:  Recurrent  Relieved by:  Nothing  Worsened by:  Nothing  Ineffective treatments:  None tried  Associated symptoms: nausea    Associated symptoms: no chest pain and no palpitations        Prior to Admission Medications   Prescriptions Last Dose Informant Patient Reported? Taking? Insulin Lispro-aabc, 1 U Dial, (Lyumjev KwikPen) 100 UNIT/ML SOPN   Yes Yes   Sig: Inject 8 Units under the skin Three times a day   ibuprofen (MOTRIN) 600 mg tablet   No No   Sig: Take 1 tablet (600 mg total) by mouth every 6 (six) hours as needed for mild pain for up to 10 days   insulin degludec Elonda Koyanagi FlexTouch) 100 units/mL injection pen   Yes Yes   Sig: Inject 20 Units under the skin daily   lidocaine (LIDODERM) 5 %   No No   Sig: Apply 1 patch topically daily To right side chest wall at area of maximal tenderness/pain      Facility-Administered Medications: None       Past Medical History:   Diagnosis Date    Diabetes mellitus ([address] )     Glaucoma     Subaortic membrane        Past Surgical History:   Procedure Laterality Date    CARDIAC SURGERY         History reviewed  No pertinent family history  I have reviewed and agree with the history as documented      E-Cigarette/Vaping     E-Cigarette/Vaping Substances     Social History     Tobacco Use    Smoking status: Never Smoker    Smokeless tobacco: Never Used   Substance Use Topics  Alcohol use: Not Currently    Drug use: Not Currently       Review of Systems   Constitutional: Negative  Negative for fever  HENT: Negative  Eyes: Positive for visual disturbance  Respiratory: Negative  Cardiovascular: Negative for chest pain and palpitations  Gastrointestinal: Positive for nausea  Genitourinary: Negative  Musculoskeletal: Negative  Skin: Negative  Neurological: Positive for dizziness  Hematological: Negative  Physical Exam  Physical Exam  Vitals and nursing note reviewed  Constitutional:       General: He is not in acute distress  HENT:      Head: Normocephalic and atraumatic  Mouth/Throat:      Mouth: Mucous membranes are moist       Pharynx: Oropharynx is clear  Eyes:      Extraocular Movements: Extraocular movements intact  Pupils: Pupils are equal, round, and reactive to light  Cardiovascular:      Rate and Rhythm: Normal rate and regular rhythm  Pulmonary:      Effort: Pulmonary effort is normal       Breath sounds: Normal breath sounds  Abdominal:      Palpations: Abdomen is soft  Tenderness: There is no abdominal tenderness  Musculoskeletal:         General: Normal range of motion  Skin:     General: Skin is warm and dry  Neurological:      General: No focal deficit present  Mental Status: He is alert and oriented to person, place, and time  Motor: No weakness        Gait: Gait normal          Vital Signs  ED Triage Vitals   Temperature Pulse Respirations Blood Pressure SpO2   06/23/22 0835 06/23/22 0835 06/23/22 0835 06/23/22 0835 06/23/22 0835   98 7 °F (37 1 °C) 98 19 109/74 99 %      Temp Source Heart Rate Source Patient Position - Orthostatic VS BP Location FiO2 (%)   06/23/22 0835 06/23/22 0957 06/23/22 0957 06/23/22 0957 --   Oral Monitor Lying Left arm       Pain Score       06/23/22 0835       No Pain           Vitals:    06/23/22 0835 06/23/22 0957 06/23/22 1117   BP: 109/74 104/70 93/58   Pulse: 98 76 84   Patient Position - Orthostatic VS:  Lying Lying         Visual Acuity  Visual Acuity    Flowsheet Row Most Recent Value   L Pupil Size (mm) 3   R Pupil Size (mm) 3          ED Medications  Medications   lactated ringers bolus 1,000 mL (1,000 mL Intravenous New Bag 6/23/22 0849)   lactated ringers bolus 1,000 mL (1,000 mL Intravenous New Bag 6/23/22 0931)   insulin regular (HumuLIN R,NovoLIN R) injection 10 Units (10 Units Subcutaneous Given 6/23/22 0932)       Diagnostic Studies  Results Reviewed     Procedure Component Value Units Date/Time    Fingerstick Glucose (POCT) [[id number]]  (Abnormal) Collected: 06/23/22 1124    Lab Status: Final result Updated: 06/23/22 1127     POC Glucose 255 mg/dl     Urine Microscopic [[id number]] Collected: 06/23/22 1048    Lab Status: Final result Specimen: Urine, Clean Catch Updated: 06/23/22 1107     RBC, UA None Seen /hpf      WBC, UA 2-4 /hpf      Epithelial Cells Occasional /hpf      Bacteria, UA Occasional /hpf      OTHER OBSERVATIONS Yeast Cells Present    UA (URINE) with reflex to Scope [[id number]]  (Abnormal) Collected: 06/23/22 1048    Lab Status: Final result Specimen: Urine, Clean Catch Updated: 06/23/22 1056     Color, UA Yellow     Clarity, UA Clear     Specific Gravity, UA 1 015     pH, UA 6 5     Leukocytes, UA Elevated glucose may cause decreased leukocyte values  See urine microscopic for [facility] result/     Nitrite, UA Negative     Protein, UA Negative mg/dl      Glucose, UA >=1000 (1%) mg/dl      Ketones, UA 15 (1+) mg/dl      Urobilinogen, UA 0 2 E U /dl      Bilirubin, UA Negative     Occult Blood, UA Negative    Urine Macroscopic, POC [[id number]]  (Abnormal) Collected: 06/23/22 1043    Lab Status: Final result Specimen: Urine Updated: 06/23/22 1044     Color, UA Yellow     Clarity, UA Clear     pH, UA 6 5     Leukocytes, UA Elevated glucose may cause decreased leukocyte values   See urine microscopic for [facility] result/     Nitrite, UA Negative     Protein, UA Negative mg/dl      Glucose, UA >=1000 (1%) mg/dl      Ketones, UA 15 (1+) mg/dl      Urobilinogen, UA 0 2 E U /dl      Bilirubin, UA Negative     Occult Blood, UA Negative     Specific Gravity, UA 1 010    Narrative:      CLINITEK RESULT    Urine Microscopic [[id number]] Collected: 06/23/22 1043    Lab Status: No result Specimen: Urine, Clean Catch     Basic metabolic panel [[id number]]  (Abnormal) Collected: 06/23/22 0847    Lab Status: Final result Specimen: Blood from Arm, Left Updated: 06/23/22 0918     Sodium 132 mmol/L      Potassium 4 5 mmol/L      Chloride 95 mmol/L      CO2 28 mmol/L      ANION GAP 9 mmol/L      BUN 13 mg/dL      Creatinine 0 91 mg/dL      Glucose 577 mg/dL      Calcium 9 0 mg/dL      eGFR 117 ml/min/1 73sq m     Narrative:      [place] guidelines for Chronic Kidney Disease (CKD):     Stage 1 with normal or high GFR (GFR > 90 mL/min/1 73 square meters)    Stage 2 Mild CKD (GFR = 60-89 mL/min/1 73 square meters)    Stage 3A Moderate CKD (GFR = 45-59 mL/min/1 73 square meters)    Stage 3B Moderate CKD (GFR = 30-44 mL/min/1 73 square meters)    Stage 4 Severe CKD (GFR = 15-29 mL/min/1 73 square meters)    Stage 5 End Stage CKD (GFR <15 mL/min/1 73 square meters)  Note: GFR calculation is accurate only with a steady state creatinine    Blood gas, venous [[id number]]  (Abnormal) Collected: 06/23/22 0847    Lab Status: Final result Specimen: Blood from Arm, Left Updated: 06/23/22 0900     pH, Oli 7 348     pCO2, Oli 44 5 mm Hg      pO2, Oli 52 1 mm Hg      HCO3, Oli 23 9 mmol/L      Base Excess, Oli -1 9 mmol/L      O2 Content, Oli 18 9 ml/dL      O2 HGB, VENOUS 84 8 %     CBC and differential [[id number]] Collected: 06/23/22 0847    Lab Status: Final result Specimen: Blood from Arm, Left Updated: 06/23/22 0857     WBC 6 49 Thousand/uL      RBC 5 28 Million/uL      Hemoglobin 15 8 g/dL      Hematocrit 44 4 %      MCV 84 fL      MCH 29 9 pg      MCHC 35 6 g/dL RDW 11 7 %      MPV 10 6 fL      Platelets 590 Thousands/uL      nRBC 0 /100 WBCs      Neutrophils Relative 71 %      Immat GRANS % 0 %      Lymphocytes Relative 21 %      Monocytes Relative 7 %      Eosinophils Relative 0 %      Basophils Relative 1 %      Neutrophils Absolute 4 63 Thousands/µL      Immature Grans Absolute 0 02 Thousand/uL      Lymphocytes Absolute 1 33 Thousands/µL      Monocytes Absolute 0 46 Thousand/µL      Eosinophils Absolute 0 02 Thousand/µL      Basophils Absolute 0 03 Thousands/µL     Fingerstick Glucose (POCT) [[id number]]  (Abnormal) Collected: 06/23/22 0834    Lab Status: Final result Updated: 06/23/22 0835     POC Glucose >500 mg/dl                  No orders to display              Procedures  ECG 12 Lead Documentation Only    Date/Time: 6/23/2022 8:45 AM  Performed by: Dangelo Rudolph MD  Authorized by: Dangelo Rudolph MD     ECG reviewed by me, the ED Provider: yes    Patient location:  ED  Rate:     ECG rate assessment: normal    Rhythm:     Rhythm: sinus rhythm    Ectopy:     Ectopy: none    QRS:     QRS axis:  Normal  Conduction:     Conduction: normal    Comments:      Sinus rhythm at 93, no acute changes             ED Course  ED Course as of 06/23/22 1129   u Jun 23, 2022   0924 Patient with hyperglycemia, no evidence of DKA  Patient not acidotic, normal anion gap and serum bicarb  Will continue IV fluids and give subcutaneous insulin  1128 Patient reports feeling better after receiving insulin and fluids  Was seen by  in ED, outpatient follow-up set up for patient  Patient states he has another insulin, instructed to use cooling bag to take his insulin to work  Patient able stand and ambulate in ED without difficulty  Instructed to follow-up return for any new concerning symptoms                                 SBIRT 20yo+    Flowsheet Row Most Recent Value   SBIRT (23 yo +)    In order to provide better care to our patients, we are screening all of our patients for alcohol and drug use  Would it be okay to ask you these screening questions? No Filed at: 06/23/2022 0848                    MDM  Number of Diagnoses or Management Options  Diagnosis management comments: 17-year-old male, presenting with dizziness  Differential diagnosis includes hyperglycemia, DKA, dehydration, arrhythmia among other diagnosis  EKG labs ordered, will start IV fluids, continue to monitor in ED and re-evaluate  Amount and/or Complexity of Data Reviewed  Clinical lab tests: ordered and reviewed  Tests in the medicine section of CPT®: ordered and reviewed  Review and summarize past medical records: yes  Independent visualization of images, tracings, or specimens: yes        Disposition  Final diagnoses:   Hyperglycemia     Time reflects when diagnosis was documented in both MDM as applicable and the Disposition within this note     Time User Action Codes Description Comment    6/23/2022 11:29 AM Carlos Harris Add [R73 9] Hyperglycemia       ED Disposition     ED Disposition   Discharge    Condition   Stable    Date/Time   Thu Jun 23, 2022 11:29 AM    Comment   Gadiel Spence 5 discharge to home/self care  Follow-up Information    None         Patient's Medications   Discharge Prescriptions    No medications on file       No discharge procedures on file      PDMP Review     None          ED Provider  Electronically Signed by           Gale Hector MD  06/23/22 [phone]

## 2022-06-23 NOTE — CASE MANAGEMENT
Case Management ED Discharge Planning Note    Patient name French Krause ED 15/ED 15 MRN [id number]  : 2000 Date 2022        OBJECTIVE:  Predictive Model Details         50% Factor Value    Risk of Hospital Admission or ED Visit Model Number of ED Visits 4     Is in Relationship No     Has Diabetes Yes            Chief Complaint: Dizziness   Patient Class: Emergency  Preferred Pharmacy:   [facility] [address] [facility], [place] [address] [place] [address] [place] [address] [address] [facility]  Phone: [phone] Fax: [phone]    Primary Care Provider: No primary care provider on file      Primary Insurance:   Secondary Insurance:     ED Discharge Details:

## 2022-07-28 ENCOUNTER — HOSPITAL ENCOUNTER (INPATIENT)
Facility: HOSPITAL | Age: 22
LOS: 1 days | Discharge: HOME | DRG: 638 | End: 2022-07-29
Attending: EMERGENCY MEDICINE | Admitting: INTERNAL MEDICINE

## 2022-07-28 ENCOUNTER — APPOINTMENT (EMERGENCY)
Dept: RADIOLOGY | Facility: HOSPITAL | Age: 22
DRG: 638 | End: 2022-07-28

## 2022-07-28 DIAGNOSIS — R07.89 ATYPICAL CHEST PAIN: ICD-10-CM

## 2022-07-28 DIAGNOSIS — E10.65 TYPE 1 DIABETES MELLITUS WITH HYPERGLYCEMIA (HCC): ICD-10-CM

## 2022-07-28 DIAGNOSIS — Z86.39 HISTORY OF DIABETES MELLITUS: ICD-10-CM

## 2022-07-28 DIAGNOSIS — R73.9 HYPERGLYCEMIA: Primary | ICD-10-CM

## 2022-07-28 DIAGNOSIS — N17.9 ACUTE KIDNEY INJURY (HCC): ICD-10-CM

## 2022-07-28 PROBLEM — E87.1 HYPONATREMIA: Status: ACTIVE | Noted: 2022-07-28

## 2022-07-28 LAB
ANION GAP SERPL CALCULATED.3IONS-SCNC: 9 MMOL/L (ref 4–13)
ATRIAL RATE: 72 BPM
BACTERIA UR QL AUTO: NORMAL /HPF
BASE EX.OXY STD BLDV CALC-SCNC: 90.6 % (ref 60–80)
BASE EXCESS BLDV CALC-SCNC: -3 MMOL/L
BASOPHILS # BLD AUTO: 0.02 THOUSANDS/ΜL (ref 0–0.1)
BASOPHILS NFR BLD AUTO: 0 % (ref 0–1)
BETA-HYDROXYBUTYRATE: 1.3 MMOL/L
BILIRUB UR QL STRIP: NEGATIVE
BUN SERPL-MCNC: 16 MG/DL (ref 5–25)
CALCIUM SERPL-MCNC: 8.8 MG/DL (ref 8.3–10.1)
CARDIAC TROPONIN I PNL SERPL HS: <2 NG/L
CHLORIDE SERPL-SCNC: 92 MMOL/L (ref 96–108)
CLARITY UR: CLEAR
CO2 SERPL-SCNC: 27 MMOL/L (ref 21–32)
COLOR UR: YELLOW
CREAT SERPL-MCNC: 1.32 MG/DL (ref 0.6–1.3)
EOSINOPHIL # BLD AUTO: 0.03 THOUSAND/ΜL (ref 0–0.61)
EOSINOPHIL NFR BLD AUTO: 1 % (ref 0–6)
ERYTHROCYTE [DISTWIDTH] IN BLOOD BY AUTOMATED COUNT: 12.3 % (ref 11.6–15.1)
FLUAV RNA RESP QL NAA+PROBE: NEGATIVE
FLUBV RNA RESP QL NAA+PROBE: NEGATIVE
GFR SERPL CREATININE-BSD FRML MDRD: 74 ML/MIN/1.73SQ M
GLUCOSE SERPL-MCNC: 199 MG/DL (ref 65–140)
GLUCOSE SERPL-MCNC: 250 MG/DL (ref 65–140)
GLUCOSE SERPL-MCNC: 736 MG/DL (ref 65–140)
GLUCOSE UR STRIP-MCNC: ABNORMAL MG/DL
HCO3 BLDV-SCNC: 22.9 MMOL/L (ref 24–30)
HCT VFR BLD AUTO: 43.3 % (ref 36.5–49.3)
HGB BLD-MCNC: 14.9 G/DL (ref 12–17)
HGB UR QL STRIP.AUTO: NEGATIVE
IMM GRANULOCYTES # BLD AUTO: 0.02 THOUSAND/UL (ref 0–0.2)
IMM GRANULOCYTES NFR BLD AUTO: 0 % (ref 0–2)
KETONES UR STRIP-MCNC: ABNORMAL MG/DL
LEUKOCYTE ESTERASE UR QL STRIP: ABNORMAL
LYMPHOCYTES # BLD AUTO: 1.33 THOUSANDS/ΜL (ref 0.6–4.47)
LYMPHOCYTES NFR BLD AUTO: 21 % (ref 14–44)
MCH RBC QN AUTO: 30.3 PG (ref 26.8–34.3)
MCHC RBC AUTO-ENTMCNC: 34.4 G/DL (ref 31.4–37.4)
MCV RBC AUTO: 88 FL (ref 82–98)
MONOCYTES # BLD AUTO: 0.53 THOUSAND/ΜL (ref 0.17–1.22)
MONOCYTES NFR BLD AUTO: 8 % (ref 4–12)
NEUTROPHILS # BLD AUTO: 4.37 THOUSANDS/ΜL (ref 1.85–7.62)
NEUTS SEG NFR BLD AUTO: 70 % (ref 43–75)
NITRITE UR QL STRIP: NEGATIVE
NON-SQ EPI CELLS URNS QL MICRO: NORMAL /HPF
NRBC BLD AUTO-RTO: 0 /100 WBCS
O2 CT BLDV-SCNC: 18 ML/DL
P AXIS: 74 DEGREES
PCO2 BLDV: 43.9 MM HG (ref 42–50)
PH BLDV: 7.33 [PH] (ref 7.3–7.4)
PH UR STRIP.AUTO: 6 [PH] (ref 4.5–8)
PLATELET # BLD AUTO: 300 THOUSANDS/UL (ref 149–390)
PMV BLD AUTO: 10.5 FL (ref 8.9–12.7)
PO2 BLDV: 69 MM HG (ref 35–45)
POTASSIUM SERPL-SCNC: 4.6 MMOL/L (ref 3.5–5.3)
PR INTERVAL: 180 MS
PROT UR STRIP-MCNC: NEGATIVE MG/DL
QRS AXIS: 94 DEGREES
QRSD INTERVAL: 90 MS
QT INTERVAL: 364 MS
QTC INTERVAL: 398 MS
RBC # BLD AUTO: 4.92 MILLION/UL (ref 3.88–5.62)
RBC #/AREA URNS AUTO: NORMAL /HPF
RSV RNA RESP QL NAA+PROBE: NEGATIVE
SARS-COV-2 RNA RESP QL NAA+PROBE: NEGATIVE
SODIUM SERPL-SCNC: 128 MMOL/L (ref 135–147)
SP GR UR STRIP.AUTO: 1.01 (ref 1–1.03)
T WAVE AXIS: 61 DEGREES
UROBILINOGEN UR QL STRIP.AUTO: 0.2 E.U./DL
VENTRICULAR RATE: 72 BPM
WBC # BLD AUTO: 6.3 THOUSAND/UL (ref 4.31–10.16)
WBC #/AREA URNS AUTO: NORMAL /HPF

## 2022-07-28 PROCEDURE — 82010 KETONE BODYS QUAN: CPT | Performed by: EMERGENCY MEDICINE

## 2022-07-28 PROCEDURE — 93005 ELECTROCARDIOGRAM TRACING: CPT

## 2022-07-28 PROCEDURE — 0241U HB NFCT DS VIR RESP RNA 4 TRGT: CPT | Performed by: NURSE PRACTITIONER

## 2022-07-28 PROCEDURE — 99285 EMERGENCY DEPT VISIT HI MDM: CPT | Performed by: EMERGENCY MEDICINE

## 2022-07-28 PROCEDURE — 99285 EMERGENCY DEPT VISIT HI MDM: CPT

## 2022-07-28 PROCEDURE — 82948 REAGENT STRIP/BLOOD GLUCOSE: CPT

## 2022-07-28 PROCEDURE — 96372 THER/PROPH/DIAG INJ SC/IM: CPT

## 2022-07-28 PROCEDURE — 81001 URINALYSIS AUTO W/SCOPE: CPT

## 2022-07-28 PROCEDURE — 36415 COLL VENOUS BLD VENIPUNCTURE: CPT | Performed by: EMERGENCY MEDICINE

## 2022-07-28 PROCEDURE — 96361 HYDRATE IV INFUSION ADD-ON: CPT

## 2022-07-28 PROCEDURE — 93010 ELECTROCARDIOGRAM REPORT: CPT

## 2022-07-28 PROCEDURE — 85025 COMPLETE CBC W/AUTO DIFF WBC: CPT | Performed by: EMERGENCY MEDICINE

## 2022-07-28 PROCEDURE — 99223 1ST HOSP IP/OBS HIGH 75: CPT | Performed by: INTERNAL MEDICINE

## 2022-07-28 PROCEDURE — 84484 ASSAY OF TROPONIN QUANT: CPT | Performed by: EMERGENCY MEDICINE

## 2022-07-28 PROCEDURE — 82805 BLOOD GASES W/O2 SATURATION: CPT | Performed by: EMERGENCY MEDICINE

## 2022-07-28 PROCEDURE — 80048 BASIC METABOLIC PNL TOTAL CA: CPT | Performed by: EMERGENCY MEDICINE

## 2022-07-28 PROCEDURE — 71046 X-RAY EXAM CHEST 2 VIEWS: CPT

## 2022-07-28 PROCEDURE — 96374 THER/PROPH/DIAG INJ IV PUSH: CPT

## 2022-07-28 RX ORDER — KETOROLAC TROMETHAMINE 30 MG/ML
15 INJECTION, SOLUTION INTRAMUSCULAR; INTRAVENOUS ONCE
Status: COMPLETED | OUTPATIENT
Start: 2022-07-28 | End: 2022-07-28

## 2022-07-28 RX ORDER — INSULIN LISPRO 100 [IU]/ML
10 INJECTION, SOLUTION INTRAVENOUS; SUBCUTANEOUS ONCE
Status: COMPLETED | OUTPATIENT
Start: 2022-07-28 | End: 2022-07-28

## 2022-07-28 RX ORDER — SODIUM CHLORIDE 9 MG/ML
75 INJECTION, SOLUTION INTRAVENOUS CONTINUOUS
Status: DISCONTINUED | OUTPATIENT
Start: 2022-07-28 | End: 2022-07-29

## 2022-07-28 RX ORDER — PANTOPRAZOLE SODIUM 40 MG/10ML
40 INJECTION, POWDER, LYOPHILIZED, FOR SOLUTION INTRAVENOUS
Status: DISCONTINUED | OUTPATIENT
Start: 2022-07-29 | End: 2022-07-29

## 2022-07-28 RX ADMIN — INSULIN LISPRO 10 UNITS: 100 INJECTION, SOLUTION INTRAVENOUS; SUBCUTANEOUS at 20:05

## 2022-07-28 RX ADMIN — SODIUM CHLORIDE 150 ML/HR: 9 INJECTION, SOLUTION INTRAVENOUS at 23:35

## 2022-07-28 RX ADMIN — SODIUM CHLORIDE 4 UNITS/HR: 9 INJECTION, SOLUTION INTRAVENOUS at 21:59

## 2022-07-28 RX ADMIN — KETOROLAC TROMETHAMINE 15 MG: 30 INJECTION, SOLUTION INTRAMUSCULAR; INTRAVENOUS at 19:12

## 2022-07-28 RX ADMIN — SODIUM CHLORIDE 1000 ML: 0.9 INJECTION, SOLUTION INTRAVENOUS at 20:02

## 2022-07-28 NOTE — ED PROVIDER NOTES
History  Chief Complaint   Patient presents with    Chest Pain     Intermittent left side chest pain beginning this morning  Hx of cardiac surgery on apical membrane in 2016  C/o dizziness with tightness in throat     23 y o  M w/h/o DM, cardiac surgery for subaortic membrane (2016) p/w chest pain x 15 hours  Started around 1-2am   "Strong" pain at left chest  Reports he got dizzy when it came on and felt something in throat so he drank water and went to bd  Woke up and still had pain  Pain is sharp, and nonradiating  Constant  Reports associated cough and SOB  History provided by:  Patient   used: Yes ([employer] [id number])    Chest Pain  Pain location:  L chest  Pain quality: sharp    Pain radiates to the back: no    Duration:  16 hours  Chronicity:  New  Context: not breathing    Ineffective treatments: Oxcodone  Associated symptoms: cough, nausea and shortness of breath    Associated symptoms: no abdominal pain, no back pain, no fever and not vomiting        Prior to Admission Medications   Prescriptions Last Dose Informant Patient Reported? Taking? Insulin Lispro-aabc, 1 U Dial, (Lyumevelin KwikPen) 100 UNIT/ML SOPN   Yes Yes   Sig: Inject 8 Units under the skin Three times a day   ibuprofen (MOTRIN) 600 mg tablet   No No   Sig: Take 1 tablet (600 mg total) by mouth every 6 (six) hours as needed for mild pain for up to 10 days   insulin degludec [place] FlexTouch) 100 units/mL injection pen   Yes Yes   Sig: Inject 20 Units under the skin daily      Facility-Administered Medications: None       Past Medical History:   Diagnosis Date    Diabetes mellitus ([facility] [address] )     Glaucoma     Subaortic membrane        Past Surgical History:   Procedure Laterality Date    CARDIAC SURGERY         History reviewed  No pertinent family history  I have reviewed and agree with the history as documented      E-Cigarette/Vaping     E-Cigarette/Vaping Substances     Social History     Tobacco Use    Smoking status: Never Smoker    Smokeless tobacco: Never Used   Substance Use Topics    Alcohol use: Not Currently    Drug use: Not Currently       Review of Systems   Constitutional: Negative for fever  Respiratory: Positive for cough and shortness of breath  Cardiovascular: Positive for chest pain  Gastrointestinal: Positive for nausea  Negative for abdominal pain, diarrhea and vomiting  Musculoskeletal: Negative for back pain  All other systems reviewed and are negative  Physical Exam  Physical Exam  Vitals and nursing note reviewed  Constitutional:       General: He is not in acute distress  Appearance: He is well-developed  He is not ill-appearing, toxic-appearing or diaphoretic  HENT:      Head: Normocephalic and atraumatic  Eyes:      General: No scleral icterus  Conjunctiva/sclera:      Right eye: Right conjunctiva is not injected  Left eye: Left conjunctiva is not injected  Neck:      Vascular: No JVD  Trachea: Trachea normal    Cardiovascular:      Rate and Rhythm: Normal rate and regular rhythm  Pulses: Normal pulses  Heart sounds: Normal heart sounds  No murmur heard  No friction rub  Pulmonary:      Effort: Pulmonary effort is normal  No accessory muscle usage or respiratory distress  Breath sounds: Normal breath sounds  No stridor  No wheezing, rhonchi or rales  Chest:      Chest wall: No tenderness  Abdominal:      General: There is no distension  Palpations: Abdomen is soft  Tenderness: There is no abdominal tenderness  There is no guarding or rebound  Musculoskeletal:      Cervical back: Normal range of motion  Right lower leg: No tenderness  No edema  Left lower leg: No tenderness  No edema  Skin:     General: Skin is warm and dry  Coloration: Skin is not pale  Findings: No rash  Neurological:      Mental Status: He is alert  GCS: GCS eye subscore is 4  GCS verbal subscore is 5   GCS motor subscore is 6    Psychiatric:         Behavior: Behavior normal          Vital Signs  ED Triage Vitals   Temperature Pulse Respirations Blood Pressure SpO2   07/28/22 1839 07/28/22 1839 07/28/22 1839 07/28/22 1839 07/28/22 1839   (!) 97 3 °F (36 3 °C) 85 18 109/64 98 %      Temp Source Heart Rate Source Patient Position - Orthostatic VS BP Location FiO2 (%)   07/28/22 1839 07/28/22 1839 07/28/22 1839 07/28/22 1839 --   Oral Monitor Sitting Right arm       Pain Score       07/28/22 1912       5           Vitals:    07/28/22 1839 07/28/22 1946 07/28/22 2000   BP: 109/64 100/59 117/72   Pulse: 85 75 76   Patient Position - Orthostatic VS: Sitting Lying Lying         Visual Acuity      ED Medications  Medications   sodium chloride 0 9 % bolus 1,000 mL (1,000 mL Intravenous New Bag 7/28/22 2002)   ketorolac (TORADOL) injection 15 mg (15 mg Intravenous Given 7/28/22 1912)   insulin lispro (HumaLOG) 100 units/mL subcutaneous injection 10 Units (10 Units Subcutaneous Given 7/28/22 2005)       Diagnostic Studies  Results Reviewed     Procedure Component Value Units Date/Time    Beta Hydroxybutyrate [[id number]]  (Abnormal) Collected: 07/28/22 2009    Lab Status: Final result Specimen: Blood from Arm, Left Updated: 07/28/22 2034     BETA-HYDROXYBUTYRATE 1 3 mmol/L     Blood gas, venous [[id number]]  (Abnormal) Collected: 07/28/22 2009    Lab Status: Final result Specimen: Blood from Arm, Left Updated: 07/28/22 2031     pH, Oli 7 335     pCO2, Oli 43 9 mm Hg      pO2, Oli 69 0 mm Hg      HCO3, Oli 22 9 mmol/L      Base Excess, Oli -3 0 mmol/L      O2 Content, Oli 18 0 ml/dL      O2 HGB, VENOUS 90 6 %     Basic metabolic panel [[id number]]  (Abnormal) Collected: 07/28/22 1912    Lab Status: Final result Specimen: Blood from Arm, Left Updated: 07/28/22 1950     Sodium 128 mmol/L      Potassium 4 6 mmol/L      Chloride 92 mmol/L      CO2 27 mmol/L      ANION GAP 9 mmol/L      BUN 16 mg/dL      Creatinine 1 32 mg/dL      Glucose 736 mg/dL Calcium 8 8 mg/dL      eGFR 74 ml/min/1 73sq m     Narrative:      National Kidney Disease Foundation guidelines for Chronic Kidney Disease (CKD):     Stage 1 with normal or high GFR (GFR > 90 mL/min/1 73 square meters)    Stage 2 Mild CKD (GFR = 60-89 mL/min/1 73 square meters)    Stage 3A Moderate CKD (GFR = 45-59 mL/min/1 73 square meters)    Stage 3B Moderate CKD (GFR = 30-44 mL/min/1 73 square meters)    Stage 4 Severe CKD (GFR = 15-29 mL/min/1 73 square meters)    Stage 5 End Stage CKD (GFR <15 mL/min/1 73 square meters)  Note: GFR calculation is accurate only with a steady state creatinine    HS Troponin 0hr (reflex protocol) [[id number]]  (Normal) Collected: 07/28/22 1912    Lab Status: Final result Specimen: Blood from Arm, Left Updated: 07/28/22 1945     hs TnI 0hr <2 ng/L     CBC and differential [[id number]] Collected: 07/28/22 1912    Lab Status: Final result Specimen: Blood from Arm, Left Updated: 07/28/22 1923     WBC 6 30 Thousand/uL      RBC 4 92 Million/uL      Hemoglobin 14 9 g/dL      Hematocrit 43 3 %      MCV 88 fL      MCH 30 3 pg      MCHC 34 4 g/dL      RDW 12 3 %      MPV 10 5 fL      Platelets 912 Thousands/uL      nRBC 0 /100 WBCs      Neutrophils Relative 70 %      Immat GRANS % 0 %      Lymphocytes Relative 21 %      Monocytes Relative 8 %      Eosinophils Relative 1 %      Basophils Relative 0 %      Neutrophils Absolute 4 37 Thousands/µL      Immature Grans Absolute 0 02 Thousand/uL      Lymphocytes Absolute 1 33 Thousands/µL      Monocytes Absolute 0 53 Thousand/µL      Eosinophils Absolute 0 03 Thousand/µL      Basophils Absolute 0 02 Thousands/µL                  XR chest 2 views   ED Interpretation by DO Rick (07/28 1945)   No acute abnormalities                 Procedures  ECG 12 Lead Documentation Only    Date/Time: 7/28/2022 7:08 PM  Performed by: DO Rick  Authorized by: DO Rick     Indications / Diagnosis:  Chest pain  ECG reviewed by me, the ED Provider: yes    Patient location:  Bedside  Rate:     ECG rate:  72    ECG rate assessment: normal    Rhythm:     Rhythm: sinus rhythm    Ectopy:     Ectopy: none    QRS:     QRS axis:  Right    QRS intervals:  Normal  ST segments:     ST segments:  Normal  T waves:     T waves: normal    Other findings:     Other findings: early repolarization               ED Course  ED Course as of 07/28/22 2044   Thu Jul 28, 2022 1952 Glucose, Random(!!): [address] Creatinine(!): 1 32 1959 Updated pt on labs  Pt reports compliance with his insulin  Pt states he does not have a doctor currently and no one is overseeing his insulin  Will admit  SBIRT 20yo+    Flowsheet Row Most Recent Value   SBIRT (23 yo +)    In order to provide better care to our patients, we are screening all of our patients for alcohol and drug use  Would it be okay to ask you these screening questions? No Filed at: 07/28/2022 2035                    MDM    Disposition  Final diagnoses:   Hyperglycemia   History of diabetes mellitus   Acute kidney injury [facility])     Time reflects when diagnosis was documented in both MDM as applicable and the Disposition within this note     Time User Action Codes Description Comment    7/28/2022  8:00 PM Bayron Rome 48 [R73 9] Hyperglycemia     7/28/2022  8:00 PM Bayron Rome 48 [Z86 39] History of diabetes mellitus     7/28/2022  8:00 PM Domitila Rome Add [N17 9] Acute kidney injury [facility])       ED Disposition     ED Disposition   Admit    Condition   Stable    Date/Time   u Jul 28, 2022  8:39 PM    Comment   Case was discussed with Dr Vinny Nino and the patient's admission status was agreed to be Admission Status: inpatient status to the service of Dr Vinny Nino   Follow-up Information    None         Patient's Medications   Discharge Prescriptions    No medications on file       No discharge procedures on file      PDMP Review     None          ED Provider  Electronically Signed by           Blaine Kehr,   07/28/22 2043

## 2022-07-28 NOTE — LETTER
[id number]  Dept: [phone]    July 29, 2022     Patient: Juan Rockwell   YOB: 2000   Date of Visit: 7/28/2022       To Whom it May Concern:    Agustin Escamilla is under my professional care  He was seen in the hospital from 7/28/2022   to 07/29/22  He may return to work on 7/30 without limitations  If you have any questions or concerns, please don't hesitate to call           Sincerely,          JAYE Boykin

## 2022-07-29 VITALS
RESPIRATION RATE: 15 BRPM | OXYGEN SATURATION: 97 % | DIASTOLIC BLOOD PRESSURE: 64 MMHG | WEIGHT: 134.7 LBS | HEART RATE: 74 BPM | SYSTOLIC BLOOD PRESSURE: 100 MMHG | TEMPERATURE: 98.3 F | HEIGHT: 66 IN | BODY MASS INDEX: 21.65 KG/M2

## 2022-07-29 PROBLEM — E87.1 HYPONATREMIA: Status: RESOLVED | Noted: 2022-07-28 | Resolved: 2022-07-29

## 2022-07-29 PROBLEM — E11.65 TYPE 2 DIABETES MELLITUS WITH HYPERGLYCEMIA, WITH LONG-TERM CURRENT USE OF INSULIN (HCC): Status: ACTIVE | Noted: 2022-07-28

## 2022-07-29 PROBLEM — R07.89 ATYPICAL CHEST PAIN: Status: RESOLVED | Noted: 2022-07-28 | Resolved: 2022-07-29

## 2022-07-29 PROBLEM — N17.9 AKI (ACUTE KIDNEY INJURY) (HCC): Status: RESOLVED | Noted: 2022-07-28 | Resolved: 2022-07-29

## 2022-07-29 PROBLEM — Z79.4 TYPE 2 DIABETES MELLITUS WITH HYPERGLYCEMIA, WITH LONG-TERM CURRENT USE OF INSULIN (HCC): Status: ACTIVE | Noted: 2022-07-28

## 2022-07-29 PROBLEM — Q24.4 SUBAORTIC MEMBRANE: Status: ACTIVE | Noted: 2022-07-29

## 2022-07-29 PROBLEM — E10.649 TYPE 1 DIABETES MELLITUS WITH HYPOGLYCEMIA AND WITHOUT COMA (HCC): Status: ACTIVE | Noted: 2022-07-28

## 2022-07-29 LAB
ANION GAP SERPL CALCULATED.3IONS-SCNC: 10 MMOL/L (ref 4–13)
BASOPHILS # BLD AUTO: 0.02 THOUSANDS/ΜL (ref 0–0.1)
BASOPHILS NFR BLD AUTO: 0 % (ref 0–1)
BUN SERPL-MCNC: 13 MG/DL (ref 5–25)
CALCIUM SERPL-MCNC: 7.9 MG/DL (ref 8.3–10.1)
CHLORIDE SERPL-SCNC: 107 MMOL/L (ref 96–108)
CO2 SERPL-SCNC: 24 MMOL/L (ref 21–32)
CREAT SERPL-MCNC: 0.86 MG/DL (ref 0.6–1.3)
EOSINOPHIL # BLD AUTO: 0.06 THOUSAND/ΜL (ref 0–0.61)
EOSINOPHIL NFR BLD AUTO: 1 % (ref 0–6)
ERYTHROCYTE [DISTWIDTH] IN BLOOD BY AUTOMATED COUNT: 12.4 % (ref 11.6–15.1)
EST. AVERAGE GLUCOSE BLD GHB EST-MCNC: 352 MG/DL
GFR SERPL CREATININE-BSD FRML MDRD: 121 ML/MIN/1.73SQ M
GLUCOSE SERPL-MCNC: 146 MG/DL (ref 65–140)
GLUCOSE SERPL-MCNC: 169 MG/DL (ref 65–140)
GLUCOSE SERPL-MCNC: 193 MG/DL (ref 65–140)
GLUCOSE SERPL-MCNC: 216 MG/DL (ref 65–140)
GLUCOSE SERPL-MCNC: 216 MG/DL (ref 65–140)
GLUCOSE SERPL-MCNC: 251 MG/DL (ref 65–140)
GLUCOSE SERPL-MCNC: 271 MG/DL (ref 65–140)
GLUCOSE SERPL-MCNC: 49 MG/DL (ref 65–140)
HBA1C MFR BLD: 13.9 %
HCT VFR BLD AUTO: 39 % (ref 36.5–49.3)
HGB BLD-MCNC: 13.2 G/DL (ref 12–17)
IMM GRANULOCYTES # BLD AUTO: 0.02 THOUSAND/UL (ref 0–0.2)
IMM GRANULOCYTES NFR BLD AUTO: 0 % (ref 0–2)
LYMPHOCYTES # BLD AUTO: 1.8 THOUSANDS/ΜL (ref 0.6–4.47)
LYMPHOCYTES NFR BLD AUTO: 30 % (ref 14–44)
MCH RBC QN AUTO: 29.9 PG (ref 26.8–34.3)
MCHC RBC AUTO-ENTMCNC: 33.8 G/DL (ref 31.4–37.4)
MCV RBC AUTO: 88 FL (ref 82–98)
MONOCYTES # BLD AUTO: 0.54 THOUSAND/ΜL (ref 0.17–1.22)
MONOCYTES NFR BLD AUTO: 9 % (ref 4–12)
NEUTROPHILS # BLD AUTO: 3.61 THOUSANDS/ΜL (ref 1.85–7.62)
NEUTS SEG NFR BLD AUTO: 60 % (ref 43–75)
NRBC BLD AUTO-RTO: 0 /100 WBCS
PLATELET # BLD AUTO: 255 THOUSANDS/UL (ref 149–390)
PMV BLD AUTO: 10.2 FL (ref 8.9–12.7)
POTASSIUM SERPL-SCNC: 3.3 MMOL/L (ref 3.5–5.3)
RBC # BLD AUTO: 4.42 MILLION/UL (ref 3.88–5.62)
SODIUM SERPL-SCNC: 141 MMOL/L (ref 135–147)
WBC # BLD AUTO: 6.05 THOUSAND/UL (ref 4.31–10.16)

## 2022-07-29 PROCEDURE — 83036 HEMOGLOBIN GLYCOSYLATED A1C: CPT | Performed by: INTERNAL MEDICINE

## 2022-07-29 PROCEDURE — 85025 COMPLETE CBC W/AUTO DIFF WBC: CPT | Performed by: INTERNAL MEDICINE

## 2022-07-29 PROCEDURE — 80048 BASIC METABOLIC PNL TOTAL CA: CPT | Performed by: INTERNAL MEDICINE

## 2022-07-29 PROCEDURE — 99239 HOSP IP/OBS DSCHRG MGMT >30: CPT | Performed by: NURSE PRACTITIONER

## 2022-07-29 PROCEDURE — C9113 INJ PANTOPRAZOLE SODIUM, VIA: HCPCS | Performed by: INTERNAL MEDICINE

## 2022-07-29 PROCEDURE — 82948 REAGENT STRIP/BLOOD GLUCOSE: CPT

## 2022-07-29 PROCEDURE — 99254 IP/OBS CNSLTJ NEW/EST MOD 60: CPT | Performed by: INTERNAL MEDICINE

## 2022-07-29 RX ORDER — INSULIN LISPRO 100 [IU]/ML
10 INJECTION, SOLUTION INTRAVENOUS; SUBCUTANEOUS
Status: DISCONTINUED | OUTPATIENT
Start: 2022-07-29 | End: 2022-07-29

## 2022-07-29 RX ORDER — DEXTROSE MONOHYDRATE 25 G/50ML
50 INJECTION, SOLUTION INTRAVENOUS ONCE
Status: DISCONTINUED | OUTPATIENT
Start: 2022-07-29 | End: 2022-07-29

## 2022-07-29 RX ORDER — INSULIN GLARGINE 100 [IU]/ML
10 INJECTION, SOLUTION SUBCUTANEOUS ONCE
Status: COMPLETED | OUTPATIENT
Start: 2022-07-29 | End: 2022-07-29

## 2022-07-29 RX ORDER — INSULIN LISPRO 100 [IU]/ML
1-5 INJECTION, SOLUTION INTRAVENOUS; SUBCUTANEOUS
Status: DISCONTINUED | OUTPATIENT
Start: 2022-07-29 | End: 2022-07-29 | Stop reason: HOSPADM

## 2022-07-29 RX ORDER — INSULIN GLARGINE 100 [IU]/ML
15 INJECTION, SOLUTION SUBCUTANEOUS
Status: DISCONTINUED | OUTPATIENT
Start: 2022-07-29 | End: 2022-07-29 | Stop reason: HOSPADM

## 2022-07-29 RX ORDER — INSULIN LISPRO 100 [IU]/ML
5 INJECTION, SOLUTION INTRAVENOUS; SUBCUTANEOUS
Status: DISCONTINUED | OUTPATIENT
Start: 2022-07-29 | End: 2022-07-29 | Stop reason: HOSPADM

## 2022-07-29 RX ORDER — DEXTROSE MONOHYDRATE 25 G/50ML
INJECTION, SOLUTION INTRAVENOUS
Status: DISCONTINUED
Start: 2022-07-29 | End: 2022-07-29 | Stop reason: WASHOUT

## 2022-07-29 RX ORDER — OMEPRAZOLE 20 MG/1
20 CAPSULE, DELAYED RELEASE ORAL DAILY
Refills: 0 | COMMUNITY
Start: 2022-07-29

## 2022-07-29 RX ORDER — INSULIN GLARGINE 100 [IU]/ML
10 INJECTION, SOLUTION SUBCUTANEOUS
Status: DISCONTINUED | OUTPATIENT
Start: 2022-07-29 | End: 2022-07-29

## 2022-07-29 RX ORDER — PANTOPRAZOLE SODIUM 40 MG/1
40 TABLET, DELAYED RELEASE ORAL
Status: DISCONTINUED | OUTPATIENT
Start: 2022-07-30 | End: 2022-07-29 | Stop reason: HOSPADM

## 2022-07-29 RX ADMIN — INSULIN LISPRO 5 UNITS: 100 INJECTION, SOLUTION INTRAVENOUS; SUBCUTANEOUS at 16:46

## 2022-07-29 RX ADMIN — PANTOPRAZOLE SODIUM 40 MG: 40 INJECTION, POWDER, FOR SOLUTION INTRAVENOUS at 08:37

## 2022-07-29 RX ADMIN — INSULIN LISPRO 2 UNITS: 100 INJECTION, SOLUTION INTRAVENOUS; SUBCUTANEOUS at 11:47

## 2022-07-29 RX ADMIN — INSULIN LISPRO 2 UNITS: 100 INJECTION, SOLUTION INTRAVENOUS; SUBCUTANEOUS at 08:37

## 2022-07-29 RX ADMIN — SODIUM CHLORIDE 75 ML/HR: 9 INJECTION, SOLUTION INTRAVENOUS at 08:37

## 2022-07-29 RX ADMIN — INSULIN LISPRO 2 UNITS: 100 INJECTION, SOLUTION INTRAVENOUS; SUBCUTANEOUS at 16:46

## 2022-07-29 RX ADMIN — INSULIN GLARGINE 10 UNITS: 100 INJECTION, SOLUTION SUBCUTANEOUS at 04:53

## 2022-07-29 NOTE — PLAN OF CARE
Problem: Potential for Falls  Goal: Patient will remain free of falls  Description: INTERVENTIONS:  - Educate patient/family on patient safety including physical limitations  - Instruct patient to call for assistance with activity   - Consult OT/PT to assist with strengthening/mobility   - Keep Call bell within reach  - Keep bed low and locked with side rails adjusted as appropriate  - Keep care items and personal belongings within reach  - Initiate and maintain comfort rounds  - Apply yellow socks and bracelet for high fall risk patients  - Consider moving patient to room near nurses station  Outcome: Progressing     Problem: PAIN - ADULT  Goal: Verbalizes/displays adequate comfort level or baseline comfort level  Description: Interventions:  - Encourage patient to monitor pain and request assistance  - Assess pain using appropriate pain scale  - Administer analgesics based on type and severity of pain and evaluate response  - Implement non-pharmacological measures as appropriate and evaluate response  - Consider cultural and social influences on pain and pain management  - Notify physician/advanced practitioner if interventions unsuccessful or patient reports new pain  Outcome: Progressing     Problem: INFECTION - ADULT  Goal: Absence or prevention of progression during hospitalization  Description: INTERVENTIONS:  - Assess and monitor for signs and symptoms of infection  - Monitor lab/diagnostic results  - Monitor all insertion sites, i e  indwelling lines, tubes, and drains  - Monitor endotracheal if appropriate and nasal secretions for changes in amount and color  - [place] appropriate cooling/warming therapies per order  - Administer medications as ordered  - Instruct and encourage patient and family to use good hand hygiene technique  - Identify and instruct in appropriate isolation precautions for identified infection/condition  Outcome: Progressing  Goal: Absence of fever/infection during neutropenic period  Description: INTERVENTIONS:  - Monitor WBC    Outcome: Progressing     Problem: SAFETY ADULT  Goal: Patient will remain free of falls  Description: INTERVENTIONS:  - Educate patient/family on patient safety including physical limitations  - Instruct patient to call for assistance with activity   - Consult OT/PT to assist with strengthening/mobility   - Keep Call bell within reach  - Keep bed low and locked with side rails adjusted as appropriate  - Keep care items and personal belongings within reach  - Initiate and maintain comfort rounds  - Apply yellow socks and bracelet for high fall risk patients  - Consider moving patient to room near nurses station  Outcome: Progressing  Goal: Maintain or return to baseline ADL function  Description: INTERVENTIONS:  -  Assess patient's ability to carry out ADLs; assess patient's baseline for ADL function and identify physical deficits which impact ability to perform ADLs (bathing, care of mouth/teeth, toileting, grooming, dressing, etc )  - Assess/evaluate cause of self-care deficits   - Assess range of motion  - Assess patient's mobility; develop plan if impaired  - Assess patient's need for assistive devices and provide as appropriate  - Encourage maximum independence but intervene and supervise when necessary  - Involve family in performance of ADLs  - Assess for home care needs following discharge   - Consider OT consult to assist with ADL evaluation and planning for discharge  - Provide patient education as appropriate  Outcome: Progressing  Goal: Maintains/Returns to pre admission functional level  Description: INTERVENTIONS:  - Perform BMAT or MOVE assessment daily    - Set and communicate daily mobility goal to care team and patient/family/caregiver     - Collaborate with rehabilitation services on mobility goals if consulted  - Out of bed for toileting  - Record patient progress and toleration of activity level   Outcome: Progressing     Problem: DISCHARGE PLANNING  Goal: Discharge to home or other facility with appropriate resources  Description: INTERVENTIONS:  - Identify barriers to discharge w/patient and caregiver  - Arrange for needed discharge resources and transportation as appropriate  - Identify discharge learning needs (meds, wound care, etc )  - Arrange for interpretive services to assist at discharge as needed  - Refer to Case Management Department for coordinating discharge planning if the patient needs post-hospital services based on physician/advanced practitioner order or complex needs related to functional status, cognitive ability, or social support system  Outcome: Progressing     Problem: Knowledge Deficit  Goal: Patient/family/caregiver demonstrates understanding of disease process, treatment plan, medications, and discharge instructions  Description: Complete learning assessment and assess knowledge base    Interventions:  - Provide teaching at level of understanding  - Provide teaching via preferred learning methods  Outcome: Progressing

## 2022-07-29 NOTE — ASSESSMENT & PLAN NOTE
EKG is normal sinus rhythm with early repolarization  · Patient had a cardiac surgery in the past and EKG is normal sinus rhythm  · Normal troponin  · Given the chest pain has been going on for 15 hours and no evidence of EKG or troponin shown ACS, Most likely from pain at the stomach that induced by hyperglycemia slowing down the function of stomach

## 2022-07-29 NOTE — ASSESSMENT & PLAN NOTE
· Atypical chest pain going on for more than 12 hours with normal troponin  · EKG with early repolarization  · Since his chest pain has been going on for more than 12 hours and initial troponin is less than 2, suspicion for ACS is low  · Suspect this is related to hyperglycemia/possible gastroparesis/and esophageal reflux versus musculoskeletal  · MANDO score 0  · He has no symptoms of DVT, no tachycardia, no immobilization, no recent hospitalization and, no hemoptysis or malignancy    Wells score is 0 so there was low suspicion for PE  · Start Protonix 40 mg daily
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
## 2022-07-29 NOTE — ASSESSMENT & PLAN NOTE
· 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  · Ambulatory referral was placed by PCP for f/u with [facility] cards in 4/22 but hasn't followed up outpatient   · has not seen cardiology in a few years
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
## 2022-07-29 NOTE — UTILIZATION REVIEW
Initial Clinical Review    Admission: Date/Time/Statement:   Admission Orders (From admission, onward)     Ordered        07/28/22 2039  INPATIENT ADMISSION  Once                      Orders Placed This Encounter   Procedures    INPATIENT ADMISSION     Standing Status:   Standing     Number of Occurrences:   1     Order Specific Question:   Level of Care     Answer:   Med Surg [16]     Order Specific Question:   Estimated length of stay     Answer:   More than 2 Midnights     Order Specific Question:   Certification     Answer:   I certify that inpatient services are medically necessary for this patient for a duration of greater than two midnights  See H&P and MD Progress Notes for additional information about the patient's course of treatment  ED Arrival Information     Expected   -    Arrival   7/28/2022 18:33    Acuity   Emergent            Means of arrival   Walk-In    Escorted by   Self    Service   Hospitalist    Admission type   Emergency            Arrival complaint   left upper side chest pain           Chief Complaint   Patient presents with    Chest Pain     Intermittent left side chest pain beginning this morning  Hx of cardiac surgery on apical membrane in 2016  C/o dizziness with tightness in throat       Initial Presentation: 23 y o  male presents to the ED from home with c/o pressure-like L side CP (L subcostal), rated 8/10, worse with deep inspiration, excessive thirst, frequent urination  Unrelieved with Oxycodone  PMH: h/o DM, cardiac surgery for subaortic membrane (2016)  In the ED his glucose was 736, Na 128, creat 1 32, troponin WNL  He was treated with IV fluids, Insulin, Toradol IV  He had no deficits on exam   He has known type 1 DM and is supposed to be on Tresiba and Humalog with meals with poor compliance  Possible impending DKA  He is admitted to INPATIENT status with known type 1 DM with hyperglycemia - insulin drip, IV fluids, NPO w/ sips    Atypical CP - doubt ACS< MANDO 0, Protonix daily  JEFFREY - avoid nephrotoxics  Hyponatremia - Pseudohyponatremia due to hyperglycemia, corrected Na 138  Date: 7/29   Day 2: Insulin drip on hold  Pt had glucose 49, treated hypoglycemia with juice, crackers, no anion gap  D/c insulin drip, gave Lantus insulin 10 u, recheck sugar 1-2 hrs  Most recent glucose this morning is 216  ED Triage Vitals   Temperature Pulse Respirations Blood Pressure SpO2   07/28/22 1839 07/28/22 1839 07/28/22 1839 07/28/22 1839 07/28/22 1839   (!) 97 3 °F (36 3 °C) 85 18 109/64 98 %      Temp Source Heart Rate Source Patient Position - Orthostatic VS BP Location FiO2 (%)   07/28/22 1839 07/28/22 1839 07/28/22 1839 07/28/22 1839 --   Oral Monitor Sitting Right arm       Pain Score       07/28/22 1912       5          Wt Readings from Last 1 Encounters:   07/28/22 61 1 kg (134 lb 11 2 oz)     Additional Vital Signs:   07/29/22 07:46:47 97 7 °F (36 5 °C) 72 14 104/67 79 98 % -- --   07/29/22 04:04:14 97 1 °F (36 2 °C) Abnormal  73 20 103/69 80 98 % None (Room air) Lying   07/29/22 03:30:42 97 9 °F (36 6 °C) 71 16 92/54 67 99 % None (Room air) --   07/29/22 03:29:59 97 9 °F (36 6 °C) 73 -- 92/54 67 98 % -- --   07/28/22 23:50:39 98 6 °F (37 °C) 84 18 97/63 74 98 % None (Room air) --   07/28/22 21:26:55 98 1 °F (36 7 °C) 80 20 111/67 82 98 % None (Room air) Lying   07/28/22 2100 -- 74 20 114/75 89 99 % None (Room air) Lying   07/28/22 2000 -- 76 19 117/72 91 99 % None (Room air) Lying   07/28/22 1946 -- 75 18 100/59 -- 98 % None (Room air) Lying     Pertinent Labs/Diagnostic Test Results:     7/28 ECG - NSR w/ SA    XR chest 2 views      No acute cardiopulmonary disease       Results from last 7 days   Lab Units 07/28/22  2215   SARS-COV-2  Negative     Results from last 7 days   Lab Units 07/29/22  0522 07/28/22  1912   WBC Thousand/uL 6 05 6 30   HEMOGLOBIN g/dL 13 2 14 9   HEMATOCRIT % 39 0 43 3   PLATELETS Thousands/uL 255 300   NEUTROS ABS Thousands/µL 3 61 4 37 Results from last 7 days   Lab Units 07/29/22 0522 07/28/22 1912   SODIUM mmol/L 141 128*   POTASSIUM mmol/L 3 3* 4 6   CHLORIDE mmol/L 107 92*   CO2 mmol/L 24 27   ANION GAP mmol/L 10 9   BUN mg/dL 13 16   CREATININE mg/dL 0 86 1 32*   EGFR ml/min/1 73sq m 121 74   CALCIUM mg/dL 7 9* 8 8         Results from last 7 days   Lab Units 07/29/22  0746 07/29/22  0547 07/29/22  0427 07/29/22  0404 07/29/22  0153 07/28/22  2355 07/28/22 2202   POC GLUCOSE mg/dl 216* 193* 146* 49* 271* 199* 250*     Results from last 7 days   Lab Units 07/29/22 0522 07/28/22 1912   GLUCOSE RANDOM mg/dL 169* 736*             BETA-HYDROXYBUTYRATE   Date Value Ref Range Status   07/28/2022 1 3 (H) <0 6 mmol/L Final          Results from last 7 days   Lab Units 07/28/22 2009   PH ROSALIE  7 335   PCO2 ROSALIE mm Hg 43 9   PO2 ROSALIE mm Hg 69 0*   HCO3 ROSALIE mmol/L 22 9*   BASE EXC ROSALIE mmol/L -3 0   O2 CONTENT ROSALIE ml/dL 18 0   O2 HGB, VENOUS % 90 6*             Results from last 7 days   Lab Units 07/28/22 1912   HS TNI 0HR ng/L <2     Results from last 7 days   Lab Units 07/28/22 2111   CLARITY UA  Clear   COLOR UA  Yellow   SPEC GRAV UA  1 010   PH UA  6 0   GLUCOSE UA mg/dl >=1000 (1%)*   KETONES UA mg/dl 40 (2+)*   BLOOD UA  Negative   PROTEIN UA mg/dl Negative   NITRITE UA  Negative   BILIRUBIN UA  Negative   UROBILINOGEN UA E U /dl 0 2   LEUKOCYTES UA  Elevated glucose may cause decreased leukocyte values   See urine microscopic for [facility] result/*   WBC UA /hpf None Seen   RBC UA /hpf None Seen   BACTERIA UA /hpf Occasional   EPITHELIAL CELLS WET PREP /hpf Occasional     Results from last 7 days   Lab Units 07/28/22 2215   INFLUENZA A PCR  Negative   INFLUENZA B PCR  Negative   RSV PCR  Negative     ED Treatment:   Medication Administration from 07/28/2022 1831 to 07/28/2022 2125    Date/Time Order Dose Route Action   07/28/2022 1912 ketorolac (TORADOL) injection 15 mg 15 mg Intravenous Given   07/28/2022 2002 sodium chloride 0 9 % bolus 1,000 mL 1,000 mL Intravenous New Bag   07/28/2022 2005 insulin lispro (HumaLOG) 100 units/mL subcutaneous injection 10 Units 10 Units Subcutaneous Given        Past Medical History:   Diagnosis Date    Diabetes mellitus ([facility] )     Glaucoma     Subaortic membrane      Admitting Diagnosis: Chest pain [R07 9]  Hyperglycemia [R73 9]  History of diabetes mellitus [Z86 39]  Acute kidney injury ([facility] ) [N17 9]  Age/Sex: 23 y o  male  Admission Orders:  Scheduled Medications:  insulin lispro, 1-5 Units, Subcutaneous, TID AC  insulin lispro, 1-5 Units, Subcutaneous, HS  pantoprazole, 40 mg, Intravenous, Q24H [facility]      Continuous IV Infusions:  sodium chloride, 75 mL/hr, Intravenous, Continuous  Insulin drip d/c 7/29 @ 0438    PRN Meds:     POC GLUCOSE AC/HS WITH SSI COVERAGE   Tele  IP CONSULT TO ENDOCRINOLOGY    Network Utilization Review Department  ATTENTION: Please call with any questions or concerns to [phone] and carefully listen to the prompts so that you are directed to the right person  All voicemails are confidential   [facility] all requests for admission clinical reviews, approved or denied determinations and any other requests to dedicated fax number below belonging to the campus where the patient is receiving treatment   List of dedicated fax numbers for the Facilities:  [address] DENIALS (Administrative/Medical Necessity) [phone]   1000  [facility] (Maternity/NICU/Pediatrics) [phone]   [address]  [address] Medical A[place] Viky [facility] [address] [address] [place] [address] [phone]     [address] [facility] [place] [address] [phone]

## 2022-07-29 NOTE — APP STUDENT NOTE
ANURAG STUDENT  Inpatient H&P Exam for TRAINING ONLY  Not Part of Legal Medical Record       H&P Exam - Sendy Velázquezt 23 y o  male MRN: [id number]  Unit/Bed#: Toan truong 2 -01 Encounter: [id number]      * Type 1 diabetes mellitus with hyperglycemia ([facility] )  Assessment & Plan  Lab Results   Component Value Date    HGBA1C >14 9 (H) 04/08/2022       No results for input(s): POCGLU in the last 72 hours  Blood Sugar Average: Last 72 hrs:     · History of type 1 diabetes, poorly controlled most likely due to non compliance to insulin; history of multiple admission due to hyperglycemia  · On tresiba 20 and lyumyev 8 home insulin  · Glucose on admission 736  · Hold subq insulin  · Started IV NS fluid 150cc/hr  · q2h poc glucose check  · Insulin IV drip  · Recheck BMP, CBC in the morning  · Trending glucose, switch to subq insulin when glucose <250  · Hemoglobin A1C in morning    JEFFREY (acute kidney injury) ([place] [address] )  Assessment & Plan  · Most likely due to volume depletion from dehydration and polyuria  · IV NS fluid given  · Monitor BMP in morning    Hyponatremia  Assessment & Plan  · Pseudohyponatremia due to hyperglycemia, with correction sodium is 138    Atypical chest pain  Assessment & Plan  EKG is normal sinus rhythm with early repolarization  · Patient had a cardiac surgery in the past and EKG is normal sinus rhythm  · Normal troponin  · Given the chest pain has been going on for 15 hours and no evidence of EKG or troponin shown ACS, Most likely from pain at the stomach that induced by hyperglycemia slowing down the function of stomach       VTE Prophylaxis: low risk  / sequential compression device   Code Status: level 1 full code   POLST: There is no POLST form on file for this patient (pre-hospital)  Discussion with family:     Anticipated Length of Stay:  Patient will be admitted on an Inpatient basis with an anticipated length of stay of  < 2 midnights     Justification for Hospital Stay: hyperglycemia    Total Time for Visit, including Counseling / Coordination of Care: 20 minutes  Greater than 50% of this total time spent on direct patient counseling and coordination of care  Chief Complaint:   "My chest hurt started last night"    History of Present Illness:    Hima Elizabeth is a 23 y o  male with past medical history of type 1 DM who presents with chest pain and high blood sugar  Patient described the chest pain started last night and it had been constant  The chest pain was dull in nature, located at the left sternal side of chest and does not radiate anywhere  He took 1 pill of oxycodone that he had leftover from his injury to the arm for relieving the pain  The pain continued to persist today and led him to the ED for work up  Patient admitted with polyuria  Denied shortness of breath, nausea, vomiting, headache, dizziness  Review of Systems:    Review of Systems   Constitutional: Negative for chills, diaphoresis, fatigue and fever  HENT: Negative  Eyes: Negative  Respiratory: Negative for cough, shortness of breath and wheezing  Cardiovascular: Positive for chest pain  Negative for palpitations  Gastrointestinal: Negative for abdominal pain, diarrhea, nausea and vomiting  Endocrine: Positive for polyuria  Genitourinary: Negative  Musculoskeletal: Negative  Skin: Negative  Neurological: Negative  Negative for dizziness, weakness and headaches  Hematological: Negative  Psychiatric/Behavioral: Negative  Past Medical and Surgical History:     Past Medical History:   Diagnosis Date    [facility]     Glaucoma     Subaortic membrane        Past Surgical History:   Procedure Laterality Date    CARDIAC SURGERY         Meds/Allergies:    Prior to Admission medications    Medication Sig Start Date End Date Taking?  Authorizing Provider   insulin degludec Aliyah La FlexTouch) 100 units/mL injection pen Inject 20 Units under the skin daily 4/18/22  Yes Historical Provider, MD   Insulin Lispro-aabc, 1 U Dial, (Lyumjev KwikPen) 100 UNIT/ML SOPN Inject 8 Units under the skin Three times a day 4/18/22 4/18/23 Yes Historical Provider, MD   ibuprofen (MOTRIN) 600 mg tablet Take 1 tablet (600 mg total) by mouth every 6 (six) hours as needed for mild pain for up to 10 days 3/16/22 3/26/22  Albert Reece MD   lidocaine (LIDODERM) 5 % Apply 1 patch topically daily To right side chest wall at area of maximal tenderness/pain 3/16/22 7/28/22  Albert Reece MD     I have reviewed home medications with patient personally  Allergies: No Known Allergies    Social History:     Marital Status: Single   Occupation:   Patient Pre-hospital Living Situation: home  Patient Pre-hospital Level of Mobility: no limit  Patient Pre-hospital Diet Restrictions: DM diet  Substance Use History:   Social History     Substance and Sexual Activity   Alcohol Use Not Currently     Social History     Tobacco Use   Smoking Status Never Smoker   Smokeless Tobacco Never Used     Social History     Substance and Sexual Activity   Drug Use Not Currently       Family History:    No family member has diabetes    Physical Exam:     Vitals:   Blood Pressure: 111/67 (07/28/22 2126)  Pulse: 80 (07/28/22 2126)  Temperature: 98 1 °F (36 7 °C) (07/28/22 2126)  Temp Source: Oral (07/28/22 2126)  Respirations: 20 (07/28/22 2126)  Weight - Scale: 61 1 kg (134 lb 11 2 oz) (07/28/22 1839)  SpO2: 98 % (07/28/22 2126)    Physical Exam  Constitutional:       Appearance: Normal appearance  HENT:      Head: Normocephalic and atraumatic  Right Ear: External ear normal       Left Ear: External ear normal       Nose: Nose normal       Mouth/Throat:      Mouth: Mucous membranes are moist    Eyes:      Extraocular Movements: Extraocular movements intact  Conjunctiva/sclera: Conjunctivae normal       Pupils: Pupils are equal, round, and reactive to light     Cardiovascular: Rate and Rhythm: Normal rate and regular rhythm  Pulses: Normal pulses  Heart sounds: Murmur heard  No friction rub  No gallop  Pulmonary:      Effort: Pulmonary effort is normal       Breath sounds: Normal breath sounds  No wheezing, rhonchi or rales  Abdominal:      General: Bowel sounds are normal  There is no distension  Palpations: Abdomen is soft  Tenderness: There is no abdominal tenderness  Musculoskeletal:         General: Normal range of motion  Cervical back: Normal range of motion and neck supple  Skin:     General: Skin is warm  Neurological:      General: No focal deficit present  Mental Status: He is alert and oriented to person, place, and time  Additional Data:     Lab Results: I have personally reviewed pertinent reports  Results from last 7 days   Lab Units 07/28/22 1912   WBC Thousand/uL 6 30   HEMOGLOBIN g/dL 14 9   HEMATOCRIT % 43 3   PLATELETS Thousands/uL 300   NEUTROS PCT % 70   LYMPHS PCT % 21   MONOS PCT % 8   EOS PCT % 1     Results from last 7 days   Lab Units 07/28/22 1912   SODIUM mmol/L 128*   POTASSIUM mmol/L 4 6   CHLORIDE mmol/L 92*   CO2 mmol/L 27   BUN mg/dL 16   CREATININE mg/dL 1 32*   ANION GAP mmol/L 9   CALCIUM mg/dL 8 8   GLUCOSE RANDOM mg/dL 736*                       Imaging: I have personally reviewed pertinent reports  XR chest 2 views   ED Interpretation by DO Rick (07/28 1945)   No acute abnormalities          EKG, Pathology, and Other Studies Reviewed on Admission:   · EKG: normal sinus rhythm with early repolarization    Allscripts / Epic Records Reviewed: Yes     ** Please Note: This note has been constructed using a voice recognition system   **
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
## 2022-07-29 NOTE — ASSESSMENT & PLAN NOTE
· Secondary to intravascular volume depletion from hyperglycemia and polyuria  · Avoid NSAID or nephrotoxic agent  · Hydrate with normal saline  · Repeat BMP in a m

## 2022-07-29 NOTE — PROGRESS NOTES
Paged by nurse - pt has a BS of 49  Insulin drip was already put on hold  Given juice and crackers with rise of BS up to 146  No anion gap - will d/c insulin drip  Give lantus 10 units now as pt is a type 1 diabetic  Recheck sugar in 1-2 hrs  Placed on sliding scale  Further insulin requirements to be determined by day team based off blood sugar needs

## 2022-07-29 NOTE — PLAN OF CARE
Problem: Potential for Falls  Goal: Patient will remain free of falls  Description: INTERVENTIONS:  - Educate patient/family on patient safety including physical limitations  - Instruct patient to call for assistance with activity   - Consult OT/PT to assist with strengthening/mobility   - Keep Call bell within reach  - Keep bed low and locked with side rails adjusted as appropriate  - Keep care items and personal belongings within reach  - Initiate and maintain comfort rounds  - Apply yellow socks and bracelet for high fall risk patients  - Consider moving patient to room near nurses station  7/29/2022 1620 by Dayna Landry RN  Outcome: Adequate for Discharge  7/29/2022 1030 by Dayna Landry RN  Outcome: Progressing     Problem: PAIN - ADULT  Goal: Verbalizes/displays adequate comfort level or baseline comfort level  Description: Interventions:  - Encourage patient to monitor pain and request assistance  - Assess pain using appropriate pain scale  - Administer analgesics based on type and severity of pain and evaluate response  - Implement non-pharmacological measures as appropriate and evaluate response  - Consider cultural and social influences on pain and pain management  - Notify physician/advanced practitioner if interventions unsuccessful or patient reports new pain  7/29/2022 1620 by Dayna Landry RN  Outcome: Adequate for Discharge  7/29/2022 1030 by Dayna Landry RN  Outcome: Progressing     Problem: INFECTION - ADULT  Goal: Absence or prevention of progression during hospitalization  Description: INTERVENTIONS:  - Assess and monitor for signs and symptoms of infection  - Monitor lab/diagnostic results  - Monitor all insertion sites, i e  indwelling lines, tubes, and drains  - Monitor endotracheal if appropriate and nasal secretions for changes in amount and color  - [place] appropriate cooling/warming therapies per order  - Administer medications as ordered  - Instruct and encourage patient and family to use good hand hygiene technique  - Identify and instruct in appropriate isolation precautions for identified infection/condition  7/29/2022 1620 by Lin Barlow RN  Outcome: Adequate for Discharge  7/29/2022 1030 by Lin Barlow RN  Outcome: Progressing  Goal: Absence of fever/infection during neutropenic period  Description: INTERVENTIONS:  - Monitor WBC    7/29/2022 1620 by Lin Barlow RN  Outcome: Adequate for Discharge  7/29/2022 1030 by Lin Barlow RN  Outcome: Progressing     Problem: SAFETY ADULT  Goal: Patient will remain free of falls  Description: INTERVENTIONS:  - Educate patient/family on patient safety including physical limitations  - Instruct patient to call for assistance with activity   - Consult OT/PT to assist with strengthening/mobility   - Keep Call bell within reach  - Keep bed low and locked with side rails adjusted as appropriate  - Keep care items and personal belongings within reach  - Initiate and maintain comfort rounds  - Make Fall Risk Sign visible to staff  - Apply yellow socks and bracelet for high fall risk patients  - Consider moving patient to room near nurses station  7/29/2022 1620 by Lin Barlow RN  Outcome: Adequate for Discharge  7/29/2022 1030 by Lin Barlow RN  Outcome: Progressing  Goal: Maintain or return to baseline ADL function  Description: INTERVENTIONS:  -  Assess patient's ability to carry out ADLs; assess patient's baseline for ADL function and identify physical deficits which impact ability to perform ADLs (bathing, care of mouth/teeth, toileting, grooming, dressing, etc )  - Assess/evaluate cause of self-care deficits   - Assess range of motion  - Assess patient's mobility; develop plan if impaired  - Assess patient's need for assistive devices and provide as appropriate  - Encourage maximum independence but intervene and supervise when necessary  - Involve family in performance of ADLs  - Assess for home care needs following discharge   - Consider OT consult to assist with ADL evaluation and planning for discharge  - Provide patient education as appropriate  7/29/2022 1620 by Tristen Reynolds RN  Outcome: Adequate for Discharge  7/29/2022 1030 by Tristen Reynolds RN  Outcome: Progressing  Goal: Maintains/Returns to pre admission functional level  Description: INTERVENTIONS:  - Perform BMAT or MOVE assessment daily    - Set and communicate daily mobility goal to care team and patient/family/caregiver  - Collaborate with rehabilitation services on mobility goals if consulted  - Out of bed for toileting  - Record patient progress and toleration of activity level   7/29/2022 1620 by Tristen Reynolds RN  Outcome: Adequate for Discharge  7/29/2022 1030 by Tristen Reynolds RN  Outcome: Progressing     Problem: DISCHARGE PLANNING  Goal: Discharge to home or other facility with appropriate resources  Description: INTERVENTIONS:  - Identify barriers to discharge w/patient and caregiver  - Arrange for needed discharge resources and transportation as appropriate  - Identify discharge learning needs (meds, wound care, etc )  - Arrange for interpretive services to assist at discharge as needed  - Refer to Case Management Department for coordinating discharge planning if the patient needs post-hospital services based on physician/advanced practitioner order or complex needs related to functional status, cognitive ability, or social support system  7/29/2022 1620 by Tristen Reynolds RN  Outcome: Adequate for Discharge  7/29/2022 1030 by Tristen Reynolds RN  Outcome: Progressing     Problem: Knowledge Deficit  Goal: Patient/family/caregiver demonstrates understanding of disease process, treatment plan, medications, and discharge instructions  Description: Complete learning assessment and assess knowledge base    Interventions:  - Provide teaching at level of understanding  - Provide teaching via preferred learning methods  7/29/2022 1620 by Tristen Reynolds RN  Outcome: Adequate for Discharge  7/29/2022 1030 by Zuleyma Boss, MARCUS  Outcome: Progressing

## 2022-07-29 NOTE — ASSESSMENT & PLAN NOTE
Lab Results   Component Value Date    HGBA1C >14 9 (H) 04/08/2022       No results for input(s): POCGLU in the last 72 hours      Blood Sugar Average: Last 72 hrs:     · History of type 1 diabetes, poorly controlled most likely due to non compliance to insulin; history of multiple admission due to hyperglycemia  · On tresiba 20 and lyumyev 8 home insulin  · Glucose on admission 736  · Hold subq insulin  · Started IV NS fluid 150cc/hr  · q2h poc glucose check  · Insulin IV drip  · Recheck BMP, CBC in the morning  · Trending glucose, switch to subq insulin when glucose <250  · Hemoglobin A1C in morning
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
## 2022-07-29 NOTE — ASSESSMENT & PLAN NOTE
Lab Results   Component Value Date    CREATININE 0 86 07/29/2022    CREATININE 1 32 (H) 07/28/2022    CREATININE 0 91 06/23/2022   Secondary to intravascular volume depletion from hyperglycemia and polyuria  · Now resolved   · Avoid NSAID or nephrotoxic agent  · D/c IVFs

## 2022-07-29 NOTE — ED NOTES
Pt unable to provide urine sample at this time  Given water and will check back with pt       Jair Rdz RN  07/28/22 2035

## 2022-07-29 NOTE — CONSULTS
Consultation - [facility] 23 y o  male MRN: [id number]    Unit/Bed#: [address] [id number] Encounter: [id number]      Assessment/Plan     Assessment: This is a 23y o -year-old male with diabetes with hyperglycemia  Plan:  1  Type 1 diabetes with hyperglycemia-based on positive carlos antibodies, he has type 1 diabetes  He does not go into DKA likely due to the fact that he is still producing some insulin and the islet cells have not been destroyed  At this point, I would recommend increasing Lantus to 15 units and adding Humalog 5 units with meals along with correctional insulin  Continue to monitor blood sugars over time and make adjustments to the regimen if necessary  As long as his blood sugars below 250 mg/dL, it would be reasonable to send him home from an endocrine perspective  CC: Diabetes Consult    History of Present Illness     HPI: [facility] is a 23y o  year old male with type 1 diabetes for 1 years  He is on insulin at home  He admits to polyuria, polydipsia, nocturia and blurry vision  He denies neuropathy, nephropathy, retinopathy, heart attack, stroke and claudication but does admit to none  He had an episode of hypoglycemia while on IV insulin  Inpatient consult to Endocrinology  Consult performed by: Rosaura Vazquez MD  Consult ordered by: JAYE Leslie          Review of Systems   Constitutional: Negative for chills and fever  Respiratory: Negative for shortness of breath  Cardiovascular: Negative for chest pain  Gastrointestinal: Negative for constipation, diarrhea, nausea and vomiting  Endocrine: Positive for polydipsia and polyuria  All other systems reviewed and are negative        Historical Information   Past Medical History:   Diagnosis Date    Diabetes mellitus ([facility] [address] )     Glaucoma     Subaortic membrane      Past Surgical History:   Procedure Laterality Date    CARDIAC SURGERY       Social History   Social History     Substance and Sexual Activity   Alcohol Use Not Currently     Social History     Substance and Sexual Activity   Drug Use Not Currently     Social History     Tobacco Use   Smoking Status Former Smoker    Quit date: 2018    Years since quittin 5   Smokeless Tobacco Never Used     Family History: History reviewed  No pertinent family history  Meds/Allergies   Current Facility-Administered Medications   Medication Dose Route Frequency Provider Last Rate Last Admin    insulin glargine (LANTUS) subcutaneous injection 10 Units 0 1 mL  10 Units Subcutaneous HS JAYE Posada        insulin lispro (HumaLOG) 100 units/mL subcutaneous injection 1-5 Units  1-5 Units Subcutaneous TID AC Mary Crawford PA-C   2 Units at 22 1147    insulin lispro (HumaLOG) 100 units/mL subcutaneous injection 1-5 Units  1-5 Units Subcutaneous HS Mary Crawford PA-C        [START ON 2022] pantoprazole (PROTONIX) EC tablet 40 mg  40 mg Oral Early Morning JAYE Posada         No Known Allergies    Objective   Vitals: Blood pressure (!) 142/108, pulse 82, temperature 97 7 °F (36 5 °C), resp  rate 14, height 5' 6" (1 676 m), weight 61 1 kg (134 lb 11 2 oz), SpO2 99 %  Intake/Output Summary (Last 24 hours) at 2022 1432  Last data filed at 2022 7195  Gross per 24 hour   Intake 2800 ml   Output 1 ml   Net 2799 ml     Invasive Devices  Report    Peripheral Intravenous Line  Duration           Peripheral IV 22 Left Antecubital <1 day                Physical Exam  Vitals reviewed  Constitutional:       General: He is not in acute distress  Appearance: He is well-developed  He is not diaphoretic  HENT:      Head: Normocephalic and atraumatic  Mouth/Throat:      Pharynx: No oropharyngeal exudate  Eyes:      General: Lids are normal  No scleral icterus  Right eye: No discharge  Left eye: No discharge  Conjunctiva/sclera: Conjunctivae normal    Neck:      Thyroid: No thyromegaly  Cardiovascular:      Rate and Rhythm: Normal rate and regular rhythm  Heart sounds: Normal heart sounds  No murmur heard  No friction rub  No gallop  Pulmonary:      Effort: Pulmonary effort is normal  No respiratory distress  Breath sounds: Normal breath sounds  No wheezing  Chest:   Breasts:      Right: No supraclavicular adenopathy  Left: No supraclavicular adenopathy  Abdominal:      General: Bowel sounds are normal  There is no distension  Palpations: Abdomen is soft  Tenderness: There is no abdominal tenderness  Musculoskeletal:         General: No tenderness or deformity  Normal range of motion  Cervical back: Neck supple  Lymphadenopathy:      Head:      Right side of head: No occipital adenopathy  Left side of head: No occipital adenopathy  Upper Body:      Right upper body: No supraclavicular adenopathy  Left upper body: No supraclavicular adenopathy  Skin:     General: Skin is warm  Findings: No erythema or rash  Neurological:      Mental Status: He is alert and oriented to person, place, and time  Cranial Nerves: No cranial nerve deficit  Coordination: Coordination normal    Psychiatric:         Mood and Affect: Mood normal          Behavior: Behavior normal          The history was obtained from the review of the chart, patient  Lab Results:       Lab Results   Component Value Date    WBC 6 05 07/29/2022    HGB 13 2 07/29/2022    HCT 39 0 07/29/2022    MCV 88 07/29/2022     07/29/2022     Lab Results   Component Value Date/Time    BUN 13 07/29/2022 05:22 AM    K 3 3 (L) 07/29/2022 05:22 AM     07/29/2022 05:22 AM    CO2 24 07/29/2022 05:22 AM    CREATININE 0 86 07/29/2022 05:22 AM    AST 30 06/18/2022 08:08 AM    ALT 34 06/18/2022 08:08 AM    ALB 3 8 06/18/2022 08:08 AM     No results for input(s): CHOL, HDL, LDL, TRIG, VLDL in the last 72 hours    No results found for: Shiloh Davis  POC Glucose (mg/dl)   Date Value   07/29/2022 216 (H)   07/29/2022 216 (H)   07/29/2022 193 (H)   07/29/2022 146 (H)   07/29/2022 49 (L)   07/29/2022 271 (H)   07/28/2022 199 (H)   07/28/2022 250 (H)   06/23/2022 255 (H)   06/23/2022 >500 (HH)       Imaging Studies: I have personally reviewed pertinent reports  Portions of the record may have been created with voice recognition software

## 2022-07-29 NOTE — ASSESSMENT & PLAN NOTE
· Most likely due to volume depletion from dehydration and polyuria  · IV NS fluid given  · Monitor BMP in morning

## 2022-07-29 NOTE — PLAN OF CARE
Problem: Potential for Falls  Goal: Patient will remain free of falls  Description: INTERVENTIONS:  - Educate patient/family on patient safety including physical limitations  - Instruct patient to call for assistance with activity   - Consult OT/PT to assist with strengthening/mobility   - Keep Call bell within reach  - Keep bed low and locked with side rails adjusted as appropriate  - Keep care items and personal belongings within reach  - Initiate and maintain comfort rounds  - Make Fall Risk Sign visible to staff  - Apply yellow socks and bracelet for high fall risk patients  - Consider moving patient to room near nurses station  Outcome: Progressing     Problem: PAIN - ADULT  Goal: Verbalizes/displays adequate comfort level or baseline comfort level  Description: Interventions:  - Encourage patient to monitor pain and request assistance  - Assess pain using appropriate pain scale  - Administer analgesics based on type and severity of pain and evaluate response  - Implement non-pharmacological measures as appropriate and evaluate response  - Consider cultural and social influences on pain and pain management  - Notify physician/advanced practitioner if interventions unsuccessful or patient reports new pain  Outcome: Progressing     Problem: INFECTION - ADULT  Goal: Absence or prevention of progression during hospitalization  Description: INTERVENTIONS:  - Assess and monitor for signs and symptoms of infection  - Monitor lab/diagnostic results  - Monitor all insertion sites, i e  indwelling lines, tubes, and drains  - Monitor endotracheal if appropriate and nasal secretions for changes in amount and color  - Fence appropriate cooling/warming therapies per order  - Administer medications as ordered  - Instruct and encourage patient and family to use good hand hygiene technique  - Identify and instruct in appropriate isolation precautions for identified infection/condition  Outcome: Progressing  Goal: Absence of fever/infection during neutropenic period  Description: INTERVENTIONS:  - Monitor WBC    Outcome: Progressing     Problem: SAFETY ADULT  Goal: Patient will remain free of falls  Description: INTERVENTIONS:  - Educate patient/family on patient safety including physical limitations  - Instruct patient to call for assistance with activity   - Consult OT/PT to assist with strengthening/mobility   - Keep Call bell within reach  - Keep bed low and locked with side rails adjusted as appropriate  - Keep care items and personal belongings within reach  - Initiate and maintain comfort rounds  - Make Fall Risk Sign visible to staff  - Apply yellow socks and bracelet for high fall risk patients  - Consider moving patient to room near nurses station  Outcome: Progressing  Goal: Maintain or return to baseline ADL function  Description: INTERVENTIONS:  -  Assess patient's ability to carry out ADLs; assess patient's baseline for ADL function and identify physical deficits which impact ability to perform ADLs (bathing, care of mouth/teeth, toileting, grooming, dressing, etc )  - Assess/evaluate cause of self-care deficits   - Assess range of motion  - Assess patient's mobility; develop plan if impaired  - Assess patient's need for assistive devices and provide as appropriate  - Encourage maximum independence but intervene and supervise when necessary  - Involve family in performance of ADLs  - Assess for home care needs following discharge   - Consider OT consult to assist with ADL evaluation and planning for discharge  - Provide patient education as appropriate  Outcome: Progressing  Goal: Maintains/Returns to pre admission functional level  Description: INTERVENTIONS:  - Perform BMAT or MOVE assessment daily    - Set and communicate daily mobility goal to care team and patient/family/caregiver     - Collaborate with rehabilitation services on mobility goals if consulted  - Out of bed for toileting  - Record patient progress and toleration of activity level   Outcome: Progressing     Problem: DISCHARGE PLANNING  Goal: Discharge to home or other facility with appropriate resources  Description: INTERVENTIONS:  - Identify barriers to discharge w/patient and caregiver  - Arrange for needed discharge resources and transportation as appropriate  - Identify discharge learning needs (meds, wound care, etc )  - Arrange for interpretive services to assist at discharge as needed  - Refer to Case Management Department for coordinating discharge planning if the patient needs post-hospital services based on physician/advanced practitioner order or complex needs related to functional status, cognitive ability, or social support system  Outcome: Progressing     Problem: Knowledge Deficit  Goal: Patient/family/caregiver demonstrates understanding of disease process, treatment plan, medications, and discharge instructions  Description: Complete learning assessment and assess knowledge base    Interventions:  - Provide teaching at level of understanding  - Provide teaching via preferred learning methods  Outcome: Progressing

## 2022-07-29 NOTE — ASSESSMENT & PLAN NOTE
· Atypical chest pain going on for more than 12 hours with normal troponin on admission- trops remained negative  He has no symptoms of DVT, no tachycardia, no immobilization, no recent hospitalization and, no hemoptysis or malignancy  Wells score is 0 so there was low suspicion for PE  This is noncardiac pain in the setting of hyperglycemia +/- gastroparesis/and esophageal reflux versus musculoskeletal  Pain now resolved   · EKG on admission with early repolarization    · MANDO score 0  · C/w Protonix 40 mg daily

## 2022-07-29 NOTE — NURSING NOTE
Patient provided with avs, pt/so verbalized understanding of instructions  Significant other speaks and understands english and was present at time of discharge  Pt aware to obtain new rx over the counter
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
## 2022-07-29 NOTE — ASSESSMENT & PLAN NOTE
Lab Results   Component Value Date    HGBA1C >14 9 (H) 04/08/2022     Known type 1 diabetes, supposedly on tresiba 20 units daily and Humalog 8 units with meals  With severe hyperglycemia and possible impending DKA likely in the setting of ongoing uncontrolled diabetes  Possible poor compliance  Sugars more than 700 but fortunately he has no anion gap, normal bicarbonate with only slightly elevated beta hydroxybutyrate  Hold subcutaneous insulin  Start none critical care insulin protocol algorithm 1  Hydrate with normal saline 150 mL/hour  NPO except sips of clears for now

## 2022-09-12 ENCOUNTER — HOSPITAL ENCOUNTER (EMERGENCY)
Facility: HOSPITAL | Age: 22
Discharge: HOME | End: 2022-09-12
Attending: EMERGENCY MEDICINE

## 2022-09-12 ENCOUNTER — APPOINTMENT (OUTPATIENT)
Dept: RADIOLOGY | Facility: HOSPITAL | Age: 22
End: 2022-09-12

## 2022-09-12 VITALS
BODY MASS INDEX: 22.28 KG/M2 | OXYGEN SATURATION: 100 % | TEMPERATURE: 98.2 F | SYSTOLIC BLOOD PRESSURE: 102 MMHG | WEIGHT: 138.01 LBS | HEART RATE: 81 BPM | DIASTOLIC BLOOD PRESSURE: 61 MMHG | RESPIRATION RATE: 16 BRPM

## 2022-09-12 DIAGNOSIS — E10.649 TYPE 1 DIABETES MELLITUS WITH HYPOGLYCEMIA AND WITHOUT COMA (HCC): ICD-10-CM

## 2022-09-12 DIAGNOSIS — M25.562 LEFT KNEE PAIN: ICD-10-CM

## 2022-09-12 DIAGNOSIS — R73.9 HYPERGLYCEMIA: Primary | ICD-10-CM

## 2022-09-12 LAB
ALBUMIN SERPL BCP-MCNC: 3.5 G/DL (ref 3.5–5)
ALP SERPL-CCNC: 135 U/L (ref 46–116)
ALT SERPL W P-5'-P-CCNC: 31 U/L (ref 12–78)
ANION GAP SERPL CALCULATED.3IONS-SCNC: 13 MMOL/L (ref 4–13)
ANION GAP SERPL CALCULATED.3IONS-SCNC: 15 MMOL/L (ref 4–13)
AST SERPL W P-5'-P-CCNC: 21 U/L (ref 5–45)
ATRIAL RATE: 91 BPM
BASE EX.OXY STD BLDV CALC-SCNC: 93.3 % (ref 60–80)
BASE EXCESS BLDV CALC-SCNC: -5.6 MMOL/L
BASOPHILS # BLD AUTO: 0.02 THOUSANDS/ΜL (ref 0–0.1)
BASOPHILS NFR BLD AUTO: 0 % (ref 0–1)
BETA-HYDROXYBUTYRATE: 0.4 MMOL/L
BILIRUB SERPL-MCNC: 0.21 MG/DL (ref 0.2–1)
BUN SERPL-MCNC: 11 MG/DL (ref 5–25)
BUN SERPL-MCNC: 15 MG/DL (ref 5–25)
CALCIUM SERPL-MCNC: 7.8 MG/DL (ref 8.3–10.1)
CALCIUM SERPL-MCNC: 8.4 MG/DL (ref 8.3–10.1)
CHLORIDE SERPL-SCNC: 100 MMOL/L (ref 96–108)
CHLORIDE SERPL-SCNC: 106 MMOL/L (ref 96–108)
CO2 SERPL-SCNC: 22 MMOL/L (ref 21–32)
CO2 SERPL-SCNC: 22 MMOL/L (ref 21–32)
CREAT SERPL-MCNC: 0.97 MG/DL (ref 0.6–1.3)
CREAT SERPL-MCNC: 1.04 MG/DL (ref 0.6–1.3)
EOSINOPHIL # BLD AUTO: 0.03 THOUSAND/ΜL (ref 0–0.61)
EOSINOPHIL NFR BLD AUTO: 0 % (ref 0–6)
ERYTHROCYTE [DISTWIDTH] IN BLOOD BY AUTOMATED COUNT: 11.8 % (ref 11.6–15.1)
GFR SERPL CREATININE-BSD FRML MDRD: 100 ML/MIN/1.73SQ M
GFR SERPL CREATININE-BSD FRML MDRD: 108 ML/MIN/1.73SQ M
GLUCOSE SERPL-MCNC: 410 MG/DL (ref 65–140)
GLUCOSE SERPL-MCNC: 719 MG/DL (ref 65–140)
GLUCOSE SERPL-MCNC: >500 MG/DL (ref 65–140)
GLUCOSE SERPL-MCNC: >500 MG/DL (ref 65–140)
HCO3 BLDV-SCNC: 20.3 MMOL/L (ref 24–30)
HCT VFR BLD AUTO: 40.8 % (ref 36.5–49.3)
HGB BLD-MCNC: 13.8 G/DL (ref 12–17)
IMM GRANULOCYTES # BLD AUTO: 0.02 THOUSAND/UL (ref 0–0.2)
IMM GRANULOCYTES NFR BLD AUTO: 0 % (ref 0–2)
LIPASE SERPL-CCNC: 194 U/L (ref 73–393)
LYMPHOCYTES # BLD AUTO: 1.79 THOUSANDS/ΜL (ref 0.6–4.47)
LYMPHOCYTES NFR BLD AUTO: 26 % (ref 14–44)
MCH RBC QN AUTO: 30.4 PG (ref 26.8–34.3)
MCHC RBC AUTO-ENTMCNC: 33.8 G/DL (ref 31.4–37.4)
MCV RBC AUTO: 90 FL (ref 82–98)
MONOCYTES # BLD AUTO: 0.47 THOUSAND/ΜL (ref 0.17–1.22)
MONOCYTES NFR BLD AUTO: 7 % (ref 4–12)
NEUTROPHILS # BLD AUTO: 4.58 THOUSANDS/ΜL (ref 1.85–7.62)
NEUTS SEG NFR BLD AUTO: 67 % (ref 43–75)
NRBC BLD AUTO-RTO: 0 /100 WBCS
O2 CT BLDV-SCNC: 18.9 ML/DL
P AXIS: 57 DEGREES
PCO2 BLDV: 41.1 MM HG (ref 42–50)
PH BLDV: 7.31 [PH] (ref 7.3–7.4)
PLATELET # BLD AUTO: 311 THOUSANDS/UL (ref 149–390)
PMV BLD AUTO: 10.2 FL (ref 8.9–12.7)
PO2 BLDV: 84.2 MM HG (ref 35–45)
POTASSIUM SERPL-SCNC: 3.8 MMOL/L (ref 3.5–5.3)
POTASSIUM SERPL-SCNC: 4 MMOL/L (ref 3.5–5.3)
PR INTERVAL: 190 MS
PROT SERPL-MCNC: 7.3 G/DL (ref 6.4–8.4)
QRS AXIS: 98 DEGREES
QRSD INTERVAL: 96 MS
QT INTERVAL: 344 MS
QTC INTERVAL: 423 MS
RBC # BLD AUTO: 4.54 MILLION/UL (ref 3.88–5.62)
SODIUM SERPL-SCNC: 137 MMOL/L (ref 135–147)
SODIUM SERPL-SCNC: 141 MMOL/L (ref 135–147)
T WAVE AXIS: 37 DEGREES
VENTRICULAR RATE: 91 BPM
WBC # BLD AUTO: 6.91 THOUSAND/UL (ref 4.31–10.16)

## 2022-09-12 PROCEDURE — 96361 HYDRATE IV INFUSION ADD-ON: CPT

## 2022-09-12 PROCEDURE — 82805 BLOOD GASES W/O2 SATURATION: CPT

## 2022-09-12 PROCEDURE — 73564 X-RAY EXAM KNEE 4 OR MORE: CPT

## 2022-09-12 PROCEDURE — 82948 REAGENT STRIP/BLOOD GLUCOSE: CPT

## 2022-09-12 PROCEDURE — 99285 EMERGENCY DEPT VISIT HI MDM: CPT | Performed by: EMERGENCY MEDICINE

## 2022-09-12 PROCEDURE — 80048 BASIC METABOLIC PNL TOTAL CA: CPT

## 2022-09-12 PROCEDURE — 82010 KETONE BODYS QUAN: CPT

## 2022-09-12 PROCEDURE — 96375 TX/PRO/DX INJ NEW DRUG ADDON: CPT

## 2022-09-12 PROCEDURE — 99243 OFF/OP CNSLTJ NEW/EST LOW 30: CPT | Performed by: INTERNAL MEDICINE

## 2022-09-12 PROCEDURE — 83690 ASSAY OF LIPASE: CPT

## 2022-09-12 PROCEDURE — 96365 THER/PROPH/DIAG IV INF INIT: CPT

## 2022-09-12 PROCEDURE — 99284 EMERGENCY DEPT VISIT MOD MDM: CPT

## 2022-09-12 PROCEDURE — 93010 ELECTROCARDIOGRAM REPORT: CPT | Performed by: INTERNAL MEDICINE

## 2022-09-12 PROCEDURE — 85025 COMPLETE CBC W/AUTO DIFF WBC: CPT

## 2022-09-12 PROCEDURE — 80053 COMPREHEN METABOLIC PANEL: CPT

## 2022-09-12 PROCEDURE — 36415 COLL VENOUS BLD VENIPUNCTURE: CPT

## 2022-09-12 PROCEDURE — 93005 ELECTROCARDIOGRAM TRACING: CPT

## 2022-09-12 RX ORDER — INSULIN LISPRO-AABC 100 [IU]/ML
8 INJECTION, SOLUTION SUBCUTANEOUS 3 TIMES DAILY
Qty: 8 ML | Refills: 0 | Status: SHIPPED | OUTPATIENT
Start: 2022-09-12 | End: 2022-09-12 | Stop reason: CLARIF

## 2022-09-12 RX ORDER — SODIUM CHLORIDE, SODIUM GLUCONATE, SODIUM ACETATE, POTASSIUM CHLORIDE, MAGNESIUM CHLORIDE, SODIUM PHOSPHATE, DIBASIC, AND POTASSIUM PHOSPHATE .53; .5; .37; .037; .03; .012; .00082 G/100ML; G/100ML; G/100ML; G/100ML; G/100ML; G/100ML; G/100ML
1000 INJECTION, SOLUTION INTRAVENOUS ONCE
Status: COMPLETED | OUTPATIENT
Start: 2022-09-12 | End: 2022-09-12

## 2022-09-12 RX ORDER — INSULIN GLARGINE 100 [IU]/ML
20 INJECTION, SOLUTION SUBCUTANEOUS
Qty: 10 ML | Refills: 0 | Status: SHIPPED | OUTPATIENT
Start: 2022-09-12

## 2022-09-12 RX ORDER — INSULIN DEGLUDEC INJECTION 100 U/ML
20 INJECTION, SOLUTION SUBCUTANEOUS DAILY
Qty: 6 ML | Refills: 0 | Status: SHIPPED | OUTPATIENT
Start: 2022-09-12 | End: 2022-09-12 | Stop reason: CLARIF

## 2022-09-12 RX ADMIN — SODIUM CHLORIDE, SODIUM GLUCONATE, SODIUM ACETATE, POTASSIUM CHLORIDE, MAGNESIUM CHLORIDE, SODIUM PHOSPHATE, DIBASIC, AND POTASSIUM PHOSPHATE 1000 ML: .53; .5; .37; .037; .03; .012; .00082 INJECTION, SOLUTION INTRAVENOUS at 04:32

## 2022-09-12 RX ADMIN — SODIUM CHLORIDE 1000 ML: 0.9 INJECTION, SOLUTION INTRAVENOUS at 03:02

## 2022-09-12 RX ADMIN — INSULIN HUMAN 10 UNITS: 100 INJECTION, SOLUTION PARENTERAL at 03:37

## 2022-09-12 NOTE — ED CARE HANDOFF
Emergency Department Sign Out Note        Sign out and transfer of care from Dr Harry Scanlon  See Separate Emergency Department note  The patient, Sybil De La Cruz, was evaluated by the previous provider for knee pain and hyperglycemia in context of medication noncompliance  Workup Completed:  Labs, XR knee    ED Course / Workup Pending (followup): Patient noted to be hyperglycemic in the ED  Workup revealed no evidence of DKA  L knee XR is negative  Patient will be given ortho follow up if discharged  Repeat BMP shows improvement of blood glucose; down to 419 from 700s  Patient is asymptomatic at this time  Resting comfortably  No nausea or vomiting  I contacted case management for recommendations on outpatient management of his diabetes, specifically getting him insulin until he can be seen by a PCP  Patient does not have medical insurance  Case management recommended re-prescribing patient's home insulin regimen, specifically Tresiba and Lispro  Upon investigating this further, the medication costs $1,500, and case management is unable to cover this through [facility]  They asked if there was another more cost-effective insulin regimen that the patient could take at home  I explained to patient that we are trying to figure out the best medication regimen for him  Patient does express desire to go home, but understands the importance of having insulin for his treatment of DM  Patient was given breakfast and PO hydration while waiting as we determine what is the best option for him  I consulted SLIM for recommendations on insulin therapy, as I am hesitant to start an insulin regimen on a patient with a history of medication noncompliance  Dr Inocencio Gipson, [address] [place] [address] attending, came to the ED and evaluated patient  Per chart review, patient had previously been seen by an endocrinologist, but missed several appointments   It was recommended that the patient can start Semglee 20 units at bedtime, which [facility] pharmacy is able to dispense  Dr Leonidas Licona was also able to set up an endocrinology follow up appointment in 1 week with Hazel Jiménez  I explained to the patient that Case management is going to the pharmacy to retrieve the medication as well as insulin needles, and that it is important that he wait so that I can go over how to administer the new medication  as well as talk about his follow up information  1300: Patient had eloped from the ED  I called his cell phone as well as his wife's cell phone and messages were left on both phones for the patient to return to the ED for his insulin regimen  No answer obtained from either party  Will keep patient's insulin medication in the ED should patient return  ED Course as of 09/12/22 1338   Mon Sep 12, 2022   0607 SO: L knee pain, XR negative  Ortho f/u if dc    Hyperglycemic - DM1, no insulin for 2 wks, nausea/vomiting/gen abd P  No acidosis  10 units Insulin  BMP pending 0600  If glucose down, can contact case management to arrange insulin outpatient  If still high, admit for management   0635 Glucose, Random(!): 410  Will contact case management once they arrive for outpatient case management for his insulin therapy       0839 Contacted case management; they will see patient and try to arrange outpatient follow up for his diabetes  On re-evaluation, patient is alert and awake  Requesting food and drink  1006 Case management will facilitate medication compliance as well as PCP follow up  Requested 30 day supply of patient's insulin regimen  Orders written to [facility]  Patient understands instructions for follow up and is appropriate for discharge at this time       Procedures  MDM        Disposition  Final diagnoses:   Hyperglycemia   Left knee pain   Type 1 diabetes mellitus with hypoglycemia and without coma ([facility] [address] )     Time reflects when diagnosis was documented in both MDM as applicable and the Disposition within this note     Time User Action Codes Description Comment    9/12/2022  4:23 AM Toño Camps Add [R73 9] Hyperglycemia     9/12/2022  4:23 AM Toño Camps Add [M25 562] Left knee pain     9/12/2022  9:47 AM Claire Brad Add [E10 649] Type 1 diabetes mellitus with hypoglycemia and without coma [facility])       ED Disposition     ED Disposition   Left from Room after Provider Exam    Condition   --    Date/Time   Mon Sep 12, 2022  1:21 PM    Comment   --         Follow-up Information     Follow up With Specialties Details Why Contact Info    Halle Tsai MD Family Medicine Follow up An appointment was made for you for September 27th at 4:00pm  Please cancel if you can not attend  [address] [facility] [address]  [phone]      Radhames Harry MD Orthopedic Surgery Schedule an appointment as soon as possible for a visit   [address] [place] [address]      Nicky Morgan MD Endocrinology Go on 9/19/2022 9:45 AM to see Emir  [address]    [address]          Discharge Medication List as of 9/12/2022  9:52 AM      CONTINUE these medications which have CHANGED    Details   insulin degludec Darnell Callaway FlexTouch) 100 units/mL injection pen Inject 20 Units under the skin daily, Starting Mon 9/12/2022, Until Wed 10/12/2022, Normal      Insulin Lispro-aabc, 1 U Dial, (Lyumjev KwikPen) 100 UNIT/ML SOPN Inject 8 Units under the skin 3 (three) times a day, Starting Mon 9/12/2022, Until Wed 10/12/2022, Normal         CONTINUE these medications which have NOT CHANGED    Details   omeprazole (PriLOSEC) 20 mg delayed release capsule Take 1 capsule (20 mg total) by mouth daily, Starting Fri 7/29/2022, OTC           Outpatient Discharge Orders   Insulin syringes          ED Provider  Electronically Signed by     Abran Jamison DO  09/12/22 3632

## 2022-09-12 NOTE — Clinical Note
Abiola Huynh was seen and treated in our emergency department on 9/12/2022  No restrictions            Diagnosis:     Elizabeth Schultz  may return to work on return date  He may return on this date: 09/14/2022         If you have any questions or concerns, please don't hesitate to call        Mel Media, DO    ______________________________           _______________          _______________  Hospital Representative                              Date                                Time

## 2022-09-12 NOTE — Clinical Note
Diane Bonilla was seen and treated in our emergency department on 9/12/2022  No restrictions            Diagnosis:     Corky Serra  may return to work on return date  He may return on this date: 09/14/2022         If you have any questions or concerns, please don't hesitate to call        Sebastian Tate, DO    ______________________________           _______________          _______________  Hospital Representative                              Date                                Time

## 2022-09-12 NOTE — DISCHARGE INSTRUCTIONS
Please take medications as prescribed  Knee XR negative for pathology  Please use ice and Motrin for pain control  Follow up with orthopedist as directed  Follow instructions from case management regarding PCP follow up  Return to the ED with any new/concerning issues

## 2022-09-12 NOTE — ED NOTES
Attempted to call patients contact [facility], message left regarding patients prescriptions, awaiting call back     Maria De Jesus Hurtado RN  09/12/22 8651

## 2022-09-12 NOTE — CONSULTS
Dear Dr Abigail Love,      At your kind request I evaluated Nya Bolaños in medical consultation in the emergency room  As you know, the patient is a 23 y o  male type 1 diabetes  The patient came to the emergency room with symptoms of hyperglycemia and was found to have a glucose of over 700  Unfortunately, he has run out of insulin and because of economic constraints was not able to obtain more  The patient was hydrated and given insulin and his glucose improved  He was not acidotic  He is now feeling rather well  Consultation was requested for advice regarding insulin therapy until the patient can be re-evaluated by his primary care physician  The patient's past medical history is positive for type 1 diabetes  He also underwent intervention for a subaortic membrane  He has no history of hypertension, hypercholesterolemia, other cardiac issues, COPD or asthma, etcetera  Unfortunately, at the moment, the patient is on no medication  The patient has no known allergies  Family history is noncontributory  Social history reveals that the patient is single  He denies smoking, alcohol use, or illicit drug use  Review of systems was taken in detail and is negative except as mentioned above  Vitals:    09/12/22 1037   BP: 102/61   Pulse: 81   Resp: 16   Temp:    SpO2: 100%         Physical Exam:  The patient is a well-developed, well-nourished man in no distress  HEENT examination is within normal limits  The neck is supple  Carotids are full without bruits  There is no lymphadenopathy or goiter  Lungs are clear to auscultation and percussion  There is no wheezing, rales, or rhonchi  Cardiac exam reveals a regular rhythm  I could hear no murmur, gallop, or rub  The abdomen is soft with active bowel sounds  There is no mass or tenderness  Extremities showed no clubbing, cyanosis, or edema  No calf tenderness is present    Neurologic examination reveals the patient to be alert and oriented  No focal sign was noted  CBC:   Lab Results   Component Value Date    WBC 6 91 09/12/2022    HGB 13 8 09/12/2022    HCT 40 8 09/12/2022    MCV 90 09/12/2022     09/12/2022    MCH 30 4 09/12/2022    MCHC 33 8 09/12/2022    RDW 11 8 09/12/2022    MPV 10 2 09/12/2022    NRBC 0 09/12/2022   , CMP:   Lab Results   Component Value Date    SODIUM 141 09/12/2022    K 3 8 09/12/2022     09/12/2022    CO2 22 09/12/2022    BUN 11 09/12/2022    CREATININE 0 97 09/12/2022    CALCIUM 7 8 (L) 09/12/2022    AST 21 09/12/2022    ALT 31 09/12/2022    ALKPHOS 135 (H) 09/12/2022    EGFR 108 09/12/2022       Assessment:   1  Uncontrolled type 1 diabetes  2  History of subaortic membrane     Recommendations: The patient appears stable for discharge  We were able to procure a supply of glargine insulin  Unfortunately, he will not be able to access short-acting insulin for the next several days until he is seen by his endocrinologist   I did discuss this with the endocrinology office and they will have samples available  Obviously, this is only a short-term fix  Unfortunately, the patient did not attend his last 2 scheduled endocrinology visits  We also got him an appointment with his primary care provider in 2 weeks  Thank you for the opportunity of participating in [address] [place] [address]  I hope that this information was helpful      Sincerely,  Claudio Stevenson MD

## 2022-09-12 NOTE — ED NOTES
Pt not in room  Provider made aware   Will call pt and significant other     Nichole Kelley RN  09/12/22 2036

## 2022-09-12 NOTE — ED ATTENDING ATTESTATION
9/12/2022  ICoral MD, saw and evaluated the patient  I have discussed the patient with the resident/non-physician practitioner and agree with the resident's/non-physician practitioner's findings, Plan of Care, and MDM as documented in the resident's/non-physician practitioner's note, except where noted  All available labs and Radiology studies were reviewed  I was present for key portions of any procedure(s) performed by the resident/non-physician practitioner and I was immediately available to provide assistance  At this point I agree with the current assessment done in the Emergency Department  I have conducted an independent evaluation of this patient a history and physical is as follows:        Final Diagnosis:  1  Hyperglycemia    2  Left knee pain    3  Type 1 diabetes mellitus with hypoglycemia and without coma [facility])      Chief Complaint   Patient presents with    Knee Pain    Hyperglycemia - no symptoms     Pt states left knee pain after falling last week  Otc mediction taken with no relief  Pt also states without insulin for 1 week  C/o vomiting  This is a 20-year-old male with history of type 1 diabetes who presents with nausea/vomiting  Patient states that he ran out of his insulin approximately 2 weeks ago  He has not followed up with the family doctor per endocrinologist because I do not have R Kassandra Mcgowan 23  States that over the past 2 weeks, he has been experiencing innumerable episodes of nonbloody, nonbilious vomiting every day  He has not checked his blood sugars over this period of time  Also, patient did have a fall last week injuring his left knee  He has been ambulatory since the injury          PMH:  - diabetes  PSH:  - not applicable          PE:   Vitals:    09/12/22 0315 09/12/22 0410 09/12/22 0758 09/12/22 1037   BP:  116/70 110/64 102/61   BP Location:   Right arm Left arm   Pulse: 90 96 80 81   Resp: 22 20 16 16   Temp:       TempSrc:       SpO2:  100% 100% 100%   Weight:             Constitutional: He appears well-developed  He is cooperative  No distress  HENT:   Mouth/Throat: Uvula is midline, mucous membranes are dry  Eyes: Pupils are equal, round, and reactive to light  Conjunctivae and EOM are normal    Neck: Trachea normal  No thyroid mass and no thyromegaly present  Cardiovascular:  Tachycardic, regular rhythm  Pulmonary/Chest: Effort normal and breath sounds normal    Abdominal: Soft  Normal appearance and bowel sounds are normal   Diffuse tenderness  There is no rebound, no guarding  Neurological: He is alert  Skin: Skin is warm, dry and intact  Psychiatric: He has a normal mood and affect  His speech is normal and behavior is normal  Thought content normal          A:  - this is a 24-year-old male who presents with nausea/vomiting  P:  - medication noncompliance  Will check labs  IV fluid resuscitation  X-ray left knee  Disposition pending results  - 13 point ROS was performed and all are normal unless stated in the history above  - Nursing note reviewed  Vitals reviewed  - Orders placed by myself and/or advanced practitioner / resident     - Previous chart was reviewed  - No language barrier    - History obtained from patient  - There are no limitations to the history obtained  - Critical care time: Not applicable for this patient  ED Course as of 09/14/22 2103   Mon Sep 12, 2022   5646 Comprehensive metabolic panel(!!)  Given IV insulin  Will recheck bmp at 0600  If improving, consult case management for outpatient insulin       Medications   sodium chloride 0 9 % bolus 1,000 mL (0 mL Intravenous Stopped 9/12/22 7158)   insulin regular (HumuLIN R,NovoLIN R) injection 10 Units (10 Units Intravenous Given 9/12/22 4407)   multi-electrolyte (ISOLYTE-S PH 7 4) bolus 1,000 mL (0 mL Intravenous Stopped 9/12/22 7832)     XR knee 4+ views left injury   ED Interpretation   No acute osseous abnormality as interpreted by myself  Final Result      No acute osseous abnormality  Workstation performed: [id number]           Orders Placed This Encounter   Procedures    Insulin syringes    XR knee 4+ views left injury    CBC and differential    Comprehensive metabolic panel    Lipase    Beta Hydroxybutyrate    Blood gas, venous    Basic metabolic panel    Fingerstick Glucose (POCT)    POCT glucose    ECG 12 lead    ECG 12 lead     Labs Reviewed   COMPREHENSIVE METABOLIC PANEL - Abnormal       Result Value Ref Range Status    Sodium 137  135 - 147 mmol/L Final    Potassium 4 0  3 5 - 5 3 mmol/L Final    Chloride 100  96 - 108 mmol/L Final    CO2 22  21 - 32 mmol/L Final    ANION GAP 15 (*) 4 - 13 mmol/L Final    BUN 15  5 - 25 mg/dL Final    Creatinine 1 04  0 60 - 1 30 mg/dL Final    Comment: Standardized to IDMS reference method    Glucose 719 (*) 65 - 140 mg/dL Final    Comment: 15  If the patient is fasting, the ADA then defines impaired fasting glucose as > 100 mg/dL and diabetes as > or equal to 123 mg/dL  Specimen collection should occur prior to Sulfasalazine administration due to the potential for falsely depressed results  Specimen collection should occur prior to Sulfapyridine administration due to the potential for falsely elevated results  Calcium 8 4  8 3 - 10 1 mg/dL Final    AST 21  5 - 45 U/L Final    Comment: Specimen collection should occur prior to Sulfasalazine administration due to the potential for falsely depressed results  ALT 31  12 - 78 U/L Final    Comment: Specimen collection should occur prior to Sulfasalazine administration due to the potential for falsely depressed results       Alkaline Phosphatase 135 (*) 46 - 116 U/L Final    Total Protein 7 3  6 4 - 8 4 g/dL Final    Albumin 3 5  3 5 - 5 0 g/dL Final    Total Bilirubin 0 21  0 20 - 1 00 mg/dL Final    Comment: Use of this assay is not recommended for patients undergoing treatment with eltrombopag due to the potential for falsely elevated results  eGFR 100  ml/min/1 73sq m Final    Narrative:     National Kidney Disease Foundation guidelines for Chronic Kidney Disease (CKD):     Stage 1 with normal or high GFR (GFR > 90 mL/min/1 73 square meters)    Stage 2 Mild CKD (GFR = 60-89 mL/min/1 73 square meters)    Stage 3A Moderate CKD (GFR = 45-59 mL/min/1 73 square meters)    Stage 3B Moderate CKD (GFR = 30-44 mL/min/1 73 square meters)    Stage 4 Severe CKD (GFR = 15-29 mL/min/1 73 square meters)    Stage 5 End Stage CKD (GFR <15 mL/min/1 73 square meters)  Note: GFR calculation is accurate only with a steady state creatinine   BLOOD GAS, VENOUS - Abnormal    pH, Oli 7 311  7 300 - 7 400 Final    pCO2, Oli 41 1 (*) 42 0 - 50 0 mm Hg Final    pO2, Oli 84 2 (*) 35 0 - 45 0 mm Hg Final    HCO3, Oli 20 3 (*) 24 - 30 mmol/L Final    Base Excess, Oli -5 6  mmol/L Final    O2 Content, Oli 18 9  ml/dL Final    O2 HGB, VENOUS 93 3 (*) 60 0 - 80 0 % Final   BASIC METABOLIC PANEL - Abnormal    Sodium 141  135 - 147 mmol/L Final    Potassium 3 8  3 5 - 5 3 mmol/L Final    Chloride 106  96 - 108 mmol/L Final    CO2 22  21 - 32 mmol/L Final    ANION GAP 13  4 - 13 mmol/L Final    BUN 11  5 - 25 mg/dL Final    Creatinine 0 97  0 60 - 1 30 mg/dL Final    Comment: Standardized to IDMS reference method    Glucose 410 (*) 65 - 140 mg/dL Final    Comment: Specimen Lipemic; Results May be Affected  If the patient is fasting, the ADA then defines impaired fasting glucose as > 100 mg/dL and diabetes as > or equal to 123 mg/dL  Specimen collection should occur prior to Sulfasalazine administration due to the potential for falsely depressed results  Specimen collection should occur prior to Sulfapyridine administration due to the potential for falsely elevated results      Calcium 7 8 (*) 8 3 - 10 1 mg/dL Final    eGFR 108  ml/min/1 73sq m Final    Narrative:     [place] guidelines for Chronic Kidney Disease (CKD):    Stage 1 with normal or high GFR (GFR > 90 mL/min/1 73 square meters)    Stage 2 Mild CKD (GFR = 60-89 mL/min/1 73 square meters)    Stage 3A Moderate CKD (GFR = 45-59 mL/min/1 73 square meters)    Stage 3B Moderate CKD (GFR = 30-44 mL/min/1 73 square meters)    Stage 4 Severe CKD (GFR = 15-29 mL/min/1 73 square meters)    Stage 5 End Stage CKD (GFR <15 mL/min/1 73 square meters)  Note: GFR calculation is accurate only with a steady state creatinine   POCT GLUCOSE - Abnormal    POC Glucose >500 (*) 65 - 140 mg/dl Final    Comment: CRITICAL VALUE NOTED   POCT GLUCOSE - Abnormal    POC Glucose >500 (*) 65 - 140 mg/dl Final    Comment: CRITICAL VALUE NOTED   LIPASE - Normal    Lipase 194  73 - 393 u/L Final   BETA HYDROXYBUTYRATE - Normal    BETA-HYDROXYBUTYRATE 0 4  <0 6 mmol/L Final   CBC AND DIFFERENTIAL    WBC 6 91  4 31 - 10 16 Thousand/uL Final    RBC 4 54  3 88 - 5 62 Million/uL Final    Hemoglobin 13 8  12 0 - 17 0 g/dL Final    Hematocrit 40 8  36 5 - 49 3 % Final    MCV 90  82 - 98 fL Final    MCH 30 4  26 8 - 34 3 pg Final    MCHC 33 8  31 4 - 37 4 g/dL Final    RDW 11 8  11 6 - 15 1 % Final    MPV 10 2  8 9 - 12 7 fL Final    Platelets 238  408 - 390 Thousands/uL Final    nRBC 0  /100 WBCs Final    Neutrophils Relative 67  43 - 75 % Final    Immat GRANS % 0  0 - 2 % Final    Lymphocytes Relative 26  14 - 44 % Final    Monocytes Relative 7  4 - 12 % Final    Eosinophils Relative 0  0 - 6 % Final    Basophils Relative 0  0 - 1 % Final    Neutrophils Absolute 4 58  1 85 - 7 62 Thousands/µL Final    Immature Grans Absolute 0 02  0 00 - 0 20 Thousand/uL Final    Lymphocytes Absolute 1 79  0 60 - 4 47 Thousands/µL Final    Monocytes Absolute 0 47  0 17 - 1 22 Thousand/µL Final    Eosinophils Absolute 0 03  0 00 - 0 61 Thousand/µL Final    Basophils Absolute 0 02  0 00 - 0 10 Thousands/µL Final     Time reflects when diagnosis was documented in both MDM as applicable and the Disposition within this note Time User Action Codes Description Comment    9/12/2022  4:23 AM Cinthya Boss Add [R73 9] Hyperglycemia     9/12/2022  4:23 AM Cinthya Boss Add [M25 562] Left knee pain     9/12/2022  9:47 AM Nel Evans Add [E10 649] Type 1 diabetes mellitus with hypoglycemia and without coma [facility])       ED Disposition     ED Disposition   Left from Room after Provider Exam    Condition   --    Date/Time   Mon Sep 12, 2022  1:21 PM    Comment   --         Follow-up Information     Follow up With Specialties Details Why Contact Sylvia Figueroa MD Family Medicine Follow up An appointment was made for you for September 27th at 4:00pm  Please cancel if you can not attend  [address] [facility] [address]  [phone]      Edna Olivo MD Orthopedic Surgery Schedule an appointment as soon as possible for a visit   [address] [place] [address]      Cass Trimble MD Endocrinology Go on 9/19/2022 9:45 AM to see Roland Rubio [facility] [address]  [address]          Discharge Medication List as of 9/12/2022  9:52 AM      CONTINUE these medications which have CHANGED    Details   insulin degludec Reino Love FlexTouch) 100 units/mL injection pen Inject 20 Units under the skin daily, Starting Mon 9/12/2022, Until Wed 10/12/2022, Normal      Insulin Lispro-aabc, 1 U Dial, (Lyumjev KwikPen) 100 UNIT/ML SOPN Inject 8 Units under the skin 3 (three) times a day, Starting Mon 9/12/2022, Until Wed 10/12/2022, Normal         CONTINUE these medications which have NOT CHANGED    Details   omeprazole (PriLOSEC) 20 mg delayed release capsule Take 1 capsule (20 mg total) by mouth daily, Starting Fri 7/29/2022, OTC           Outpatient Discharge Orders   Insulin syringes     Prior to Admission Medications   Prescriptions Last Dose Informant Patient Reported? Taking?    Insulin Lispro-aabc, 1 U Dial, (Lyumjev KwikPen) 100 UNIT/ML SOPN   Yes No   Sig: Inject 8 Units under the skin Three times a day   insulin degludec Tong Heck FlexTouch) 100 units/mL injection pen   Yes No   Sig: Inject 20 Units under the skin daily   omeprazole (PriLOSEC) 20 mg delayed release capsule   No No   Sig: Take 1 capsule (20 mg total) by mouth daily      Facility-Administered Medications: None       Portions of the record may have been created with voice recognition software  Occasional wrong word or "sound a like" substitutions may have occurred due to the inherent limitations of voice recognition software  Read the chart carefully and recognize, using context, where substitutions have occurred  ED Course  ED Course as of 09/14/22 2103   Mon Sep 12, 2022   5646 Comprehensive metabolic panel(!!)  Given IV insulin  Will recheck bmp at 0600  If improving, consult case management for outpatient insulin           Critical Care Time  Procedures

## 2022-09-12 NOTE — ED NOTES
Pt dropped monitor off the stand while trying to take himself to the BR  Call bell was at his side  Pt did not fall  Monitor will not work now after hooking it back up        Jose Skaggs, RN  09/12/22 0003

## 2022-09-12 NOTE — CASE MANAGEMENT
Case Management Progress Note    Patient name Justen Pike  Location ED 09/ED 09 MRN [id number]  : 2000 Date 2022       LOS (days): 0  Geometric Mean LOS (GMLOS) (days):   Days to GMLOS:        OBJECTIVE:        Current admission status: Emergency  Preferred Pharmacy:   [address] [place] [address] #[id number] KRIST[address] [employer] [address] [address]  Phone: [phone] Fax: [phone] [address] [place], [place] - [address] ,  [address] ,  [facility] [address] [facility]  Phone: [phone] Fax: [phone]    Primary Care Provider: No primary care provider on file  Primary Insurance:   Secondary Insurance:     PROGRESS NOTE:  CM reviewed chart  Pt seen in ED for knee pain and out of insulin for 2wks; Pt w/ a BS of 719  Pt was admitted for hyperglycemia 22  CM discussed w/ Pt, Pt reports he has an appointment for MA on 2022  Pt reports recently starting new job  Pt confirms he sees Dr Indra Bhatti from [facility], and reports "he had given him insulin " CM explained Pt would be given a 30day supply and then would need to f/u w/ his PCP, as well as MA process  CM made appt for Pt to see PCP, Dr Mike Russell on  at 4:00pm (first available later afternoon appt as Pt is working)  CM referring Pt to [facility] for fill of medication, emailed Financial Counselors for Patient Eligibility  Addendum:  CM received costs of Pt's meds from [employer]- (Lyumjev Kwik Pen 100 units $1,052 22 and Tresiba Flex Touch 100 units  $508 70)  CM discussed w/ MD to prescribe a lower cost insulin  MD to consult SLIM and will provide CM with additional medications  Nsg updated  SLIM consulted and recommended Insulin glargine (Semglee) 100 units  CM obtained cost from [email or web address] ($273 47) and approximately ($30) for needles  CM did request prescription for needles  MD requesting meds to be brought to Pt in ED for review      During this time CM was updated by MD that Pt had left the ED  CM will continue w/ obtaining medication in the event Pt returns  MD and Fidel updated  CM requesting MD to resend prescription for syringes  CM will keep prescription/Financial assistance form on file w/ [facility] in the even Pt returns  CM would need to be contacted if Pt returns to continue w/ obtaining medication from [facility]

## 2022-09-12 NOTE — ED PROVIDER NOTES
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 Taking? Insulin Lispro-aabc, 1 U Dial, (Lyumjev KwikPen) 100 UNIT/ML SOPN   Yes No   Sig: Inject 8 Units under the skin Three times a day   insulin degludec Velinda Ring FlexTouch) 100 units/mL injection pen   Yes No   Sig: Inject 20 Units under the skin daily   omeprazole (PriLOSEC) 20 mg delayed release capsule   No No   Sig: Take 1 capsule (20 mg total) by mouth daily      Facility-Administered Medications: None       Past Medical History:   Diagnosis Date    Glaucoma     Subaortic membrane        Past Surgical History:   Procedure Laterality Date    CARDIAC SURGERY         History reviewed  No pertinent family history  I have reviewed and agree with the history as documented      E-Cigarette/Vaping    E-Cigarette Use Never User      E-Cigarette/Vaping Substances    Nicotine No     THC No     CBD No     Flavoring No     Other No     Unknown No      Social History     Tobacco Use    Smoking status: Former Smoker     Quit date: 2018     Years since quittin 7    Smokeless tobacco: Never Used   Vaping Use    Vaping Use: Never used   Substance Use Topics    Alcohol use: Not Currently    Drug use: Not Currently        Review of Systems   Constitutional: Positive for fatigue  Negative for chills and fever  HENT: Negative for ear pain and sore throat  Eyes: Negative for photophobia and pain  Respiratory: Negative for cough and shortness of breath  Cardiovascular: Negative for chest pain and palpitations  Gastrointestinal: Positive for abdominal pain, nausea and vomiting  Genitourinary: Negative for dysuria and hematuria  Musculoskeletal: Negative for arthralgias and back pain  Skin: Negative for color change and rash  Neurological: Negative for seizures and syncope  All other systems reviewed and are negative  Physical Exam  ED Triage Vitals   Temperature Pulse Respirations Blood Pressure SpO2   22 0232 22 0232 22 0232 22 0232 22 0232   98 2 °F (36 8 °C) (!) 106 18 114/69 100 %      Temp Source Heart Rate Source Patient Position - Orthostatic VS BP Location FiO2 (%)   22 0232 22 0232 22 0232 22 0232 --   Oral Monitor Sitting Right arm       Pain Score       22 0410       No Pain             Orthostatic Vital Signs  Vitals:    22 0315 22 0410 22 0758 22 1037   BP:  116/70 110/64 102/61   Pulse: 90 96 80 81   Patient Position - Orthostatic VS:   Lying Lying       Physical Exam  Vitals and nursing note reviewed  Constitutional:       Appearance: He is well-developed  HENT:      Head: Normocephalic and atraumatic  Right Ear: External ear normal       Left Ear: External ear normal       Nose: Nose normal       Mouth/Throat:      Mouth: Mucous membranes are moist    Eyes:      Conjunctiva/sclera: Conjunctivae normal    Cardiovascular:      Rate and Rhythm: Normal rate and regular rhythm        Heart sounds: Murmur (systolic ejection murmur) heard    Pulmonary:      Effort: Pulmonary effort is normal  No respiratory distress  Breath sounds: Normal breath sounds  Abdominal:      Palpations: Abdomen is soft  Tenderness: There is no abdominal tenderness  Musculoskeletal:         General: Tenderness (medial aspect of left knee above and below joint) present  Cervical back: Neck supple  Skin:     General: Skin is warm and dry  Neurological:      General: No focal deficit present  Mental Status: He is alert     Psychiatric:         Mood and Affect: Mood normal          ED Medications  Medications   sodium chloride 0 9 % bolus 1,000 mL (0 mL Intravenous Stopped 9/12/22 0428)   insulin regular (HumuLIN R,NovoLIN R) injection 10 Units (10 Units Intravenous Given 9/12/22 0337)   multi-electrolyte (ISOLYTE-S PH 7 4) bolus 1,000 mL (0 mL Intravenous Stopped 9/12/22 0532)       Diagnostic Studies  Results Reviewed     Procedure Component Value Units Date/Time    Fingerstick Glucose (POCT) [[id number]]  (Abnormal) Collected: 09/12/22 1155    Lab Status: Final result Updated: 09/12/22 1156     POC Glucose >500 mg/dl     Basic metabolic panel [[id number]]  (Abnormal) Collected: 09/12/22 0613    Lab Status: Final result Specimen: Blood Updated: 09/12/22 7479     Sodium 141 mmol/L      Potassium 3 8 mmol/L      Chloride 106 mmol/L      CO2 22 mmol/L      ANION GAP 13 mmol/L      BUN 11 mg/dL      Creatinine 0 97 mg/dL      Glucose 410 mg/dL      Calcium 7 8 mg/dL      eGFR 108 ml/min/1 73sq m     Narrative:      [place] guidelines for Chronic Kidney Disease (CKD):     Stage 1 with normal or high GFR (GFR > 90 mL/min/1 73 square meters)    Stage 2 Mild CKD (GFR = 60-89 mL/min/1 73 square meters)    Stage 3A Moderate CKD (GFR = 45-59 mL/min/1 73 square meters)    Stage 3B Moderate CKD (GFR = 30-44 mL/min/1 73 square meters)    Stage 4 Severe CKD (GFR = 15-29 mL/min/1 73 square meters)    Stage 5 End Stage CKD (GFR <15 mL/min/1 73 square meters)  Note: GFR calculation is accurate only with a steady state creatinine    Comprehensive metabolic panel [[id number]]  (Abnormal) Collected: 09/12/22 0255    Lab Status: Final result Specimen: Blood from Arm, Right Updated: 09/12/22 0329     Sodium 137 mmol/L      Potassium 4 0 mmol/L      Chloride 100 mmol/L      CO2 22 mmol/L      ANION GAP 15 mmol/L      BUN 15 mg/dL      Creatinine 1 04 mg/dL      Glucose 719 mg/dL      Calcium 8 4 mg/dL      AST 21 U/L      ALT 31 U/L      Alkaline Phosphatase 135 U/L      Total Protein 7 3 g/dL      Albumin 3 5 g/dL      Total Bilirubin 0 21 mg/dL      eGFR 100 ml/min/1 73sq m     Narrative:      [place] guidelines for Chronic Kidney Disease (CKD):     Stage 1 with normal or high GFR (GFR > 90 mL/min/1 73 square meters)    Stage 2 Mild CKD (GFR = 60-89 mL/min/1 73 square meters)    Stage 3A Moderate CKD (GFR = 45-59 mL/min/1 73 square meters)    Stage 3B Moderate CKD (GFR = 30-44 mL/min/1 73 square meters)    Stage 4 Severe CKD (GFR = 15-29 mL/min/1 73 square meters)    Stage 5 End Stage CKD (GFR <15 mL/min/1 73 square meters)  Note: GFR calculation is accurate only with a steady state creatinine    Lipase [[id number]]  (Normal) Collected: 09/12/22 0255    Lab Status: Final result Specimen: Blood from Arm, Right Updated: 09/12/22 0327     Lipase 194 u/L     Blood gas, venous [[id number]]  (Abnormal) Collected: 09/12/22 0255    Lab Status: Final result Specimen: Blood from Arm, Right Updated: 09/12/22 0316     pH, Oli 7 311     pCO2, Oli 41 1 mm Hg      pO2, Oli 84 2 mm Hg      HCO3, Oli 20 3 mmol/L      Base Excess, Oli -5 6 mmol/L      O2 Content, Oli 18 9 ml/dL      O2 HGB, VENOUS 93 3 %     Beta Hydroxybutyrate [[id number]]  (Normal) Collected: 09/12/22 0255    Lab Status: Final result Specimen: Blood from Arm, Right Updated: 09/12/22 0316     BETA-HYDROXYBUTYRATE 0 4 mmol/L     CBC and differential [[id number]] Collected: 09/12/22 0255    Lab Status: Final result Specimen: Blood from Arm, Right Updated: 09/12/22 0310     WBC 6 91 Thousand/uL      RBC 4 54 Million/uL      Hemoglobin 13 8 g/dL      Hematocrit 40 8 %      MCV 90 fL      MCH 30 4 pg      MCHC 33 8 g/dL      RDW 11 8 %      MPV 10 2 fL      Platelets 598 Thousands/uL      nRBC 0 /100 WBCs      Neutrophils Relative 67 %      Immat GRANS % 0 %      Lymphocytes Relative 26 %      Monocytes Relative 7 %      Eosinophils Relative 0 %      Basophils Relative 0 %      Neutrophils Absolute 4 58 Thousands/µL      Immature Grans Absolute 0 02 Thousand/uL      Lymphocytes Absolute 1 79 Thousands/µL      Monocytes Absolute 0 47 Thousand/µL      Eosinophils Absolute 0 03 Thousand/µL      Basophils Absolute 0 02 Thousands/µL     Fingerstick Glucose (POCT) [[id number]]  (Abnormal) Collected: 09/12/22 0301    Lab Status: Final result Updated: 09/12/22 0303     POC Glucose >500 mg/dl                  XR knee 4+ views left injury   ED Interpretation by Lalo Barcenas MD (09/12 0328)   No acute osseous abnormality as interpreted by myself  Final Result by Jessy Trotter MD (09/12 5726)      No acute osseous abnormality  Workstation performed: [id number]               Procedures  Procedures      ED Course  ED Course as of 09/16/22 0106   Mon Sep 12, 2022   0422 Xray knee negative for acute osseous abnormality  Pending dispo will plan for ortho f/u  SBIRT 22yo+    Flowsheet Row Most Recent Value   SBIRT (23 yo +)    In order to provide better care to our patients, we are screening all of our patients for alcohol and drug use  Would it be okay to ask you these screening questions? No Filed at: 09/12/2022 0657                [facility]  Number of Diagnoses or Management Options  Hyperglycemia  Left knee pain  Type 1 diabetes mellitus with hypoglycemia and without coma [facility])  Diagnosis management comments: 23 y o M w/ multiple complaints  For his knee pain, will obtain xray to evaluate for fracture  For his hyperglycemia symptoms, will obtain CBC, CMP, Lipase, BHB, UA, VBG, and initiate IVF  Xray normal  Discussed with patient  Workup showing hyperglycemia without acidosis or ketosis  Insulin given  Discussed with Case management who will attempt to arrange outpatient resources if able  Recheck of BMP ordered and patient signed out to Dr Bri Winston  On review, patient ultimately left prior to receiving resources for insulin management  Disposition  Final diagnoses:   Hyperglycemia   Left knee pain   Type 1 diabetes mellitus with hypoglycemia and without coma ([facility] [address] )     Time reflects when diagnosis was documented in both MDM as applicable and the Disposition within this note     Time User Action Codes Description Comment    9/12/2022  4:23 AM Danika Rober Add [R73 9] Hyperglycemia     9/12/2022  4:23 AM Danika Rober Add [M25 562] Left knee pain     9/12/2022  9:47 AM Verlee Kawasaki Add [E10 649] Type 1 diabetes mellitus with hypoglycemia and without coma [facility])       ED Disposition     ED Disposition   Left from Room after Provider Exam    Condition   --    Date/Time   Mon Sep 12, 2022  1:21 PM    Comment   --         Follow-up Information     Follow up With Specialties Details Why Contact Info    Arnav Mena MD Family Medicine Follow up An appointment was made for you for September 27th at 4:00pm  Please cancel if you can not attend  [address] [place] [address]  [phone]      Romy Montez MD Orthopedic Surgery Schedule an appointment as soon as possible for a visit   [address] [place] [address]      Pj Crain MD Endocrinology Go on 9/19/2022 9:45 AM to see Honey  [address]    [address]            Discharge Medication List as of 9/12/2022  9:52 AM CONTINUE these medications which have CHANGED    Details   insulin degludec Kris Gitelman FlexTouch) 100 units/mL injection pen Inject 20 Units under the skin daily, Starting Mon 9/12/2022, Until Wed 10/12/2022, Normal      Insulin Lispro-aabc, 1 U Dial, (Lyumjev KwikPen) 100 UNIT/ML SOPN Inject 8 Units under the skin 3 (three) times a day, Starting Mon 9/12/2022, Until Wed 10/12/2022, Normal         CONTINUE these medications which have NOT CHANGED    Details   omeprazole (PriLOSEC) 20 mg delayed release capsule Take 1 capsule (20 mg total) by mouth daily, Starting Fri 7/29/2022, OTC           Outpatient Discharge Orders   Insulin syringes       PDMP Review     None           ED Provider  Attending physically available and evaluated [facility]  I managed the patient along with the ED Attending      Electronically Signed by         Dacia Johnston MD  09/16/22 2729

## 2022-11-04 ENCOUNTER — HOSPITAL ENCOUNTER (EMERGENCY)
Facility: HOSPITAL | Age: 22
Discharge: HOME | End: 2022-11-04
Attending: INTERNAL MEDICINE

## 2022-11-04 VITALS
DIASTOLIC BLOOD PRESSURE: 63 MMHG | SYSTOLIC BLOOD PRESSURE: 114 MMHG | OXYGEN SATURATION: 99 % | RESPIRATION RATE: 16 BRPM | HEART RATE: 84 BPM | BODY MASS INDEX: 21.81 KG/M2 | TEMPERATURE: 97.9 F | WEIGHT: 135.14 LBS

## 2022-11-04 DIAGNOSIS — E10.649 TYPE 1 DIABETES MELLITUS WITH HYPOGLYCEMIA AND WITHOUT COMA (HCC): ICD-10-CM

## 2022-11-04 DIAGNOSIS — R73.9 HYPERGLYCEMIA: Primary | ICD-10-CM

## 2022-11-04 DIAGNOSIS — E10.9 DM TYPE 1 (DIABETES MELLITUS, TYPE 1) (HCC): ICD-10-CM

## 2022-11-04 LAB
ALBUMIN SERPL BCP-MCNC: 4 G/DL (ref 3.5–5)
ALP SERPL-CCNC: 112 U/L (ref 43–122)
ALT SERPL W P-5'-P-CCNC: 28 U/L
ANION GAP SERPL CALCULATED.3IONS-SCNC: 11 MMOL/L (ref 5–14)
ANION GAP SERPL CALCULATED.3IONS-SCNC: 6 MMOL/L (ref 5–14)
AST SERPL W P-5'-P-CCNC: 27 U/L (ref 17–59)
BACTERIA UR QL AUTO: NORMAL /HPF
BASE EX.OXY STD BLDV CALC-SCNC: 86.9 % (ref 60–80)
BASE EXCESS BLDV CALC-SCNC: -3.6 MMOL/L
BETA-HYDROXYBUTYRATE: 1.1 MMOL/L
BILIRUB SERPL-MCNC: 0.35 MG/DL (ref 0.2–1)
BILIRUB UR QL STRIP: NEGATIVE
BUN SERPL-MCNC: 17 MG/DL (ref 5–25)
BUN SERPL-MCNC: 18 MG/DL (ref 5–25)
CALCIUM SERPL-MCNC: 8.1 MG/DL (ref 8.4–10.2)
CALCIUM SERPL-MCNC: 8.5 MG/DL (ref 8.4–10.2)
CHLORIDE SERPL-SCNC: 106 MMOL/L (ref 96–108)
CHLORIDE SERPL-SCNC: 98 MMOL/L (ref 96–108)
CLARITY UR: CLEAR
CO2 SERPL-SCNC: 22 MMOL/L (ref 21–32)
CO2 SERPL-SCNC: 22 MMOL/L (ref 21–32)
COLOR UR: ABNORMAL
CREAT SERPL-MCNC: 0.52 MG/DL (ref 0.7–1.5)
CREAT SERPL-MCNC: 0.63 MG/DL (ref 0.7–1.5)
ERYTHROCYTE [DISTWIDTH] IN BLOOD BY AUTOMATED COUNT: 11.4 % (ref 11.6–15.1)
GFR SERPL CREATININE-BSD FRML MDRD: 137 ML/MIN/1.73SQ M
GFR SERPL CREATININE-BSD FRML MDRD: 149 ML/MIN/1.73SQ M
GLUCOSE SERPL-MCNC: 248 MG/DL (ref 65–140)
GLUCOSE SERPL-MCNC: 355 MG/DL (ref 70–99)
GLUCOSE SERPL-MCNC: 395 MG/DL (ref 65–140)
GLUCOSE SERPL-MCNC: 396 MG/DL (ref 65–140)
GLUCOSE SERPL-MCNC: 568 MG/DL (ref 70–99)
GLUCOSE SERPL-MCNC: >500 MG/DL (ref 65–140)
GLUCOSE UR STRIP-MCNC: ABNORMAL MG/DL
HCO3 BLDV-SCNC: 22.3 MMOL/L (ref 24–30)
HCT VFR BLD AUTO: 41 % (ref 36.5–49.3)
HGB BLD-MCNC: 14 G/DL (ref 12–17)
HGB UR QL STRIP.AUTO: NEGATIVE
KETONES UR STRIP-MCNC: ABNORMAL MG/DL
LEUKOCYTE ESTERASE UR QL STRIP: NEGATIVE
LIPASE SERPL-CCNC: 144 U/L (ref 23–300)
MCH RBC QN AUTO: 29.5 PG (ref 26.8–34.3)
MCHC RBC AUTO-ENTMCNC: 34.1 G/DL (ref 31.4–37.4)
MCV RBC AUTO: 87 FL (ref 82–98)
NITRITE UR QL STRIP: NEGATIVE
NON-SQ EPI CELLS URNS QL MICRO: NORMAL /HPF
O2 CT BLDV-SCNC: 18 ML/DL
PCO2 BLDV: 43.1 MM HG (ref 42–50)
PH BLDV: 7.33 [PH] (ref 7.3–7.4)
PH UR STRIP.AUTO: 5 [PH]
PLATELET # BLD AUTO: 266 THOUSANDS/UL (ref 149–390)
PMV BLD AUTO: 10.4 FL (ref 8.9–12.7)
PO2 BLDV: 62.9 MM HG (ref 35–45)
POTASSIUM SERPL-SCNC: 4 MMOL/L (ref 3.5–5.3)
POTASSIUM SERPL-SCNC: 4.2 MMOL/L (ref 3.5–5.3)
PROT SERPL-MCNC: 6.8 G/DL (ref 6.4–8.4)
PROT UR STRIP-MCNC: NEGATIVE MG/DL
RBC # BLD AUTO: 4.74 MILLION/UL (ref 3.88–5.62)
RBC #/AREA URNS AUTO: NORMAL /HPF
SODIUM SERPL-SCNC: 131 MMOL/L (ref 135–147)
SODIUM SERPL-SCNC: 134 MMOL/L (ref 135–147)
SP GR UR STRIP.AUTO: 1.01 (ref 1–1.04)
UROBILINOGEN UA: NEGATIVE MG/DL
WBC # BLD AUTO: 5.96 THOUSAND/UL (ref 4.31–10.16)
WBC #/AREA URNS AUTO: NORMAL /HPF

## 2022-11-04 RX ORDER — POTASSIUM CHLORIDE 14.9 MG/ML
20 INJECTION INTRAVENOUS
Status: DISCONTINUED | OUTPATIENT
Start: 2022-11-04 | End: 2022-11-04

## 2022-11-04 RX ORDER — POTASSIUM CHLORIDE 14.9 MG/ML
20 INJECTION INTRAVENOUS ONCE
Status: DISCONTINUED | OUTPATIENT
Start: 2022-11-04 | End: 2022-11-04

## 2022-11-04 RX ORDER — SODIUM CHLORIDE 9 MG/ML
3 INJECTION INTRAVENOUS
Status: DISCONTINUED | OUTPATIENT
Start: 2022-11-04 | End: 2022-11-04 | Stop reason: HOSPADM

## 2022-11-04 RX ORDER — INSULIN LISPRO 100 [IU]/ML
10 INJECTION, SOLUTION INTRAVENOUS; SUBCUTANEOUS ONCE
Status: COMPLETED | OUTPATIENT
Start: 2022-11-04 | End: 2022-11-04

## 2022-11-04 RX ORDER — INSULIN GLARGINE 100 [IU]/ML
20 INJECTION, SOLUTION SUBCUTANEOUS
Qty: 10 ML | Refills: 0 | Status: SHIPPED | OUTPATIENT
Start: 2022-11-04

## 2022-11-04 RX ORDER — INSULIN GLARGINE 100 [IU]/ML
20 INJECTION, SOLUTION SUBCUTANEOUS
Qty: 10 ML | Refills: 0 | Status: SHIPPED | OUTPATIENT
Start: 2022-11-04 | End: 2022-11-04 | Stop reason: SDUPTHER

## 2022-11-04 RX ADMIN — INSULIN LISPRO 10 UNITS: 100 INJECTION, SOLUTION INTRAVENOUS; SUBCUTANEOUS at 11:27

## 2022-11-04 RX ADMIN — INSULIN HUMAN 10 UNITS: 100 INJECTION, SOLUTION PARENTERAL at 09:22

## 2022-11-04 RX ADMIN — SODIUM CHLORIDE 1000 ML: 0.9 INJECTION, SOLUTION INTRAVENOUS at 09:10

## 2022-11-04 RX ADMIN — SODIUM CHLORIDE 1000 ML: 0.9 INJECTION, SOLUTION INTRAVENOUS at 11:06

## 2022-11-04 NOTE — QUICK NOTE
Patient is a 49-year-old male with history of type 1 diabetes, who presented to the ED with hyperglycemia > 500  He received treatment in the ED with glucose trending down to 200s  Was reached by ED attending to determine whether patient could get a script for insulin, which CM would be able to help obtain prior to patient's discharge from the ED  On chart review, it is noted that patient follows at [facility] endocrinology, and has had difficulty with obtaining his medication secondary to being uninsured  Given type 1 diabetes, patient needs basal bolus  However it is noted that patient has previously had symptoms of hypoglycemia on bolus doses  Hence, sent only basal insulin to pharmacy with recommendations to establish care with a [facility] to help in further management of his type 1 DM, as well as with financial counselor and social workers over yun Espinoza  11/04/22

## 2022-11-04 NOTE — CASE MANAGEMENT
Case Management Discharge Planning Note    Patient name Nati Zamarripa  Location ED 08/ED 08 MRN [id number]  : 2000 Date 2022       Current Admission Date: 2022  Current Admission Diagnosis:Hyperglycemia   Patient Active Problem List    Diagnosis Date Noted   • Subaortic membrane 2022   • Type 1 diabetes mellitus with hypoglycemia and without coma ([facility] [address] ) 2022   • Hyperglycemia 2022   • Syncope 2022      LOS (days): 0  Geometric Mean LOS (GMLOS) (days):   Days to GMLOS:     OBJECTIVE:        Current admission status: Emergency   Preferred Pharmacy:   [id number] [address]  [address] [facility] [address]  Phone: [phone] Fax: [phone] [address] [facility] [address] ,  [facility] [address]  Phone: [phone] Fax: [phone]    Primary Care Provider: No primary care provider on file  Primary Insurance: No insurance   Secondary Insurance:     DISCHARGE DETAILS: CM was notified through ED physician that pt will need Medication Prior to discharge  Pt is type 1 diabetes  Pt is non complaint with meds and insulin  Pt does not have pcp  CM sent in-basket message to [address] [place] [place] [address] medical practice for new patient appointment booking  CM sent e-mail to hospital financial counselors for Rx coverage and referral to [employer] for insurance     CM department will continue to follow through pt's D/C

## 2022-11-04 NOTE — ED PROVIDER NOTES
History  Chief Complaint   Patient presents with   • Hyperglycemia - Symptomatic     Patient ran out of insulin about 3 weeks ago; c/o HA and vomiting      24 yo male patient with a PMH of DM insulin dependent and cardiac surgery for subaortic membrane () comes to the ED due to 1 day of abdominal pain, nausea and headache  The patient hasn't been taking his insulin for the past 3 weeks  Denies chest pain, SOB, fever/chills, vomiting, diarrhea or urinary symptoms  Prior to Admission Medications   Prescriptions Last Dose Informant Patient Reported? Taking?   insulin glargine (Semglee) 100 units/mL subcutaneous injection   No No   Sig: Inject 20 Units under the skin daily at bedtime   insulin glargine (Semglee) 100 units/mL subcutaneous injection   No Yes   Sig: Inject 20 Units under the skin daily at bedtime   omeprazole (PriLOSEC) 20 mg delayed release capsule   No No   Sig: Take 1 capsule (20 mg total) by mouth daily      Facility-Administered Medications: None       Past Medical History:   Diagnosis Date   • Glaucoma    • Subaortic membrane        Past Surgical History:   Procedure Laterality Date   • CARDIAC SURGERY         History reviewed  No pertinent family history  I have reviewed and agree with the history as documented  E-Cigarette/Vaping   • E-Cigarette Use Never User      E-Cigarette/Vaping Substances   • Nicotine No    • THC No    • CBD No    • Flavoring No    • Other No    • Unknown No      Social History     Tobacco Use   • Smoking status: Former Smoker     Quit date:      Years since quittin 8   • Smokeless tobacco: Never Used   Vaping Use   • Vaping Use: Never used   Substance Use Topics   • Alcohol use: Not Currently   • Drug use: Not Currently        Review of Systems   Constitutional: Negative for appetite change, chills and fever  HENT: Negative for congestion, ear pain and sore throat  Eyes: Negative for pain and visual disturbance     Respiratory: Negative for cough, chest tightness, shortness of breath and wheezing  Cardiovascular: Negative for chest pain, palpitations and leg swelling  Gastrointestinal: Positive for abdominal pain and nausea  Negative for abdominal distention, diarrhea and vomiting  Genitourinary: Negative for difficulty urinating, dysuria and hematuria  Musculoskeletal: Negative for arthralgias and back pain  Skin: Negative for color change and rash  Neurological: Positive for headaches  Negative for seizures and syncope  All other systems reviewed and are negative  Physical Exam  ED Triage Vitals   Temperature Pulse Respirations Blood Pressure SpO2   11/04/22 0757 11/04/22 0757 11/04/22 0757 11/04/22 0757 11/04/22 0757   97 9 °F (36 6 °C) 85 16 90/59 100 %      Temp Source Heart Rate Source Patient Position - Orthostatic VS BP Location FiO2 (%)   11/04/22 0757 11/04/22 0757 11/04/22 0757 11/04/22 0757 --   Oral Monitor Sitting Left arm       Pain Score       11/04/22 0815       7             Orthostatic Vital Signs  Vitals:    11/04/22 0900 11/04/22 0930 11/04/22 1104 11/04/22 1130   BP: 104/60 111/66 107/63 114/63   Pulse: 79 85 84 84   Patient Position - Orthostatic VS: Lying Lying Lying Lying       Physical Exam  Vitals and nursing note reviewed  Constitutional:       General: He is not in acute distress  Appearance: He is well-developed  HENT:      Head: Normocephalic and atraumatic  Right Ear: External ear normal       Left Ear: External ear normal       Nose: Nose normal  No congestion or rhinorrhea  Mouth/Throat:      Mouth: Mucous membranes are moist       Pharynx: Oropharynx is clear  Eyes:      Extraocular Movements: Extraocular movements intact  Conjunctiva/sclera: Conjunctivae normal    Cardiovascular:      Rate and Rhythm: Normal rate and regular rhythm  Pulses: Normal pulses  Heart sounds: Murmur (systolic, ejection type) heard     Pulmonary:      Effort: Pulmonary effort is normal  No respiratory distress  Breath sounds: Normal breath sounds  No stridor  No wheezing  Abdominal:      General: Abdomen is flat  Bowel sounds are normal       Palpations: Abdomen is soft  Tenderness: There is abdominal tenderness (diffusely, to deep palpation)  Musculoskeletal:         General: Normal range of motion  Cervical back: Neck supple  Skin:     General: Skin is warm and dry  Neurological:      Mental Status: He is alert           ED Medications  Medications   sodium chloride (PF) 0 9 % injection 3 mL (has no administration in time range)   sodium chloride 0 9 % bolus 1,000 mL (0 mL Intravenous Stopped 11/4/22 1105)   insulin regular (HumuLIN R,NovoLIN R) injection 10 Units (10 Units Intravenous Given 11/4/22 0922)   sodium chloride 0 9 % bolus 1,000 mL (0 mL Intravenous Stopped 11/4/22 1240)   insulin lispro (HumaLOG) 100 units/mL subcutaneous injection 10 Units (10 Units Subcutaneous Given 11/4/22 1127)       Diagnostic Studies  Results Reviewed     Procedure Component Value Units Date/Time    Fingerstick Glucose (POCT) [[id number]]  (Abnormal) Collected: 11/04/22 1211    Lab Status: Final result Updated: 11/04/22 1213     POC Glucose 248 mg/dl     Basic metabolic panel [[id number]]  (Abnormal) Collected: 11/04/22 1130    Lab Status: Final result Specimen: Blood from Arm, Left Updated: 11/04/22 1152     Sodium 134 mmol/L      Potassium 4 0 mmol/L      Chloride 106 mmol/L      CO2 22 mmol/L      ANION GAP 6 mmol/L      BUN 17 mg/dL      Creatinine 0 52 mg/dL      Glucose 355 mg/dL      Calcium 8 1 mg/dL      eGFR 149 ml/min/1 73sq m     Narrative:      [facility] guidelines for Chronic Kidney Disease (CKD):   •  Stage 1 with normal or high GFR (GFR > 90 mL/min/1 73 square meters)  •  Stage 2 Mild CKD (GFR = 60-89 mL/min/1 73 square meters)  •  Stage 3A Moderate CKD (GFR = 45-59 mL/min/1 73 square meters)  •  Stage 3B Moderate CKD (GFR = 30-44 mL/min/1 73 square meters)  •  Stage 4 Severe CKD (GFR = 15-29 mL/min/1 73 square meters)  •  Stage 5 End Stage CKD (GFR <15 mL/min/1 73 square meters)  Note: GFR calculation is accurate only with a steady state creatinine    Fingerstick Glucose (POCT) [[id number]]  (Abnormal) Collected: 11/04/22 1102    Lab Status: Final result Updated: 11/04/22 1103     POC Glucose 396 mg/dl     Urine Microscopic [[id number]]  (Normal) Collected: 11/04/22 0951    Lab Status: Final result Specimen: Urine, Other Updated: 11/04/22 1100     RBC, UA None Seen /hpf      WBC, UA None Seen /hpf      Epithelial Cells None Seen /hpf      Bacteria, UA None Seen /hpf     UA w Reflex to Microscopic w Reflex to Culture [[id number]]  (Abnormal) Collected: 11/04/22 0951    Lab Status: Final result Specimen: Urine, Other Updated: 11/04/22 1014     Color, UA Straw     Clarity, UA Clear     Specific Gravity, UA 1 010     pH, UA 5 0     Leukocytes, UA Negative     Nitrite, UA Negative     Protein, UA Negative mg/dl      Glucose, UA >=1000 (1%) mg/dl      Ketones, UA 15 (1+) mg/dl      Bilirubin, UA Negative     Occult Blood, UA Negative     UROBILINOGEN UA Negative mg/dL     Fingerstick Glucose (POCT) [[id number]]  (Abnormal) Collected: 11/04/22 0955    Lab Status: Final result Updated: 11/04/22 0957     POC Glucose 395 mg/dl     Lipase [[id number]]  (Normal) Collected: 11/04/22 0817    Lab Status: Final result Specimen: Blood from Arm, Left Updated: 11/04/22 0841     Lipase 144 u/L     Comprehensive metabolic panel [[id number]]  (Abnormal) Collected: 11/04/22 0817    Lab Status: Final result Specimen: Blood from Arm, Left Updated: 11/04/22 0840     Sodium 131 mmol/L      Potassium 4 2 mmol/L      Chloride 98 mmol/L      CO2 22 mmol/L      ANION GAP 11 mmol/L      BUN 18 mg/dL      Creatinine 0 63 mg/dL      Glucose 568 mg/dL      Calcium 8 5 mg/dL      AST 27 U/L      ALT 28 U/L      Alkaline Phosphatase 112 U/L      Total Protein 6 8 g/dL      Albumin 4 0 g/dL      Total Bilirubin 0 35 mg/dL      eGFR 137 ml/min/1 73sq m     Narrative:      [place] guidelines for Chronic Kidney Disease (CKD):   •  Stage 1 with normal or high GFR (GFR > 90 mL/min/1 73 square meters)  •  Stage 2 Mild CKD (GFR = 60-89 mL/min/1 73 square meters)  •  Stage 3A Moderate CKD (GFR = 45-59 mL/min/1 73 square meters)  •  Stage 3B Moderate CKD (GFR = 30-44 mL/min/1 73 square meters)  •  Stage 4 Severe CKD (GFR = 15-29 mL/min/1 73 square meters)  •  Stage 5 End Stage CKD (GFR <15 mL/min/1 73 square meters)  Note: GFR calculation is accurate only with a steady state creatinine    Beta Hydroxybutyrate [[id number]]  (Abnormal) Collected: 11/04/22 0817    Lab Status: Final result Specimen: Blood from Arm, Left Updated: 11/04/22 0828     BETA-HYDROXYBUTYRATE 1 1 mmol/L     Blood gas, venous [[id number]]  (Abnormal) Collected: 11/04/22 0817    Lab Status: Final result Specimen: Blood from Arm, Left Updated: 11/04/22 0825     pH, Oli 7 331     pCO2, Oli 43 1 mm Hg      pO2, Oli 62 9 mm Hg      HCO3, Oli 22 3 mmol/L      Base Excess, Oli -3 6 mmol/L      O2 Content, Oli 18 0 ml/dL      O2 HGB, VENOUS 86 9 %     CBC [[id number]]  (Abnormal) Collected: 11/04/22 0817    Lab Status: Final result Specimen: Blood from Arm, Left Updated: 11/04/22 0823     WBC 5 96 Thousand/uL      RBC 4 74 Million/uL      Hemoglobin 14 0 g/dL      Hematocrit 41 0 %      MCV 87 fL      MCH 29 5 pg      MCHC 34 1 g/dL      RDW 11 4 %      Platelets 400 Thousands/uL      MPV 10 4 fL     Fingerstick Glucose (POCT) [[id number]]  (Abnormal) Collected: 11/04/22 0756    Lab Status: Final result Updated: 11/04/22 0756     POC Glucose >500 mg/dl                  No orders to display         Procedures  Procedures      ED Course                             SBIRT 22yo+    Flowsheet Row Most Recent Value   SBIRT (23 yo +)    In order to provide better care to our patients, we are screening all of our patients for alcohol and drug use  Would it be okay to ask you these screening questions? Yes Filed at: 11/04/2022 8396   Initial Alcohol Screen: US AUDIT-C     1  How often do you have a drink containing alcohol? 0 Filed at: 11/04/2022 0925   2  How many drinks containing alcohol do you have on a typical day you are drinking? 0 Filed at: 11/04/2022 0925   3a  Male UNDER 65: How often do you have five or more drinks on one occasion? 0 Filed at: 11/04/2022 0925   3b  FEMALE Any Age, or MALE 65+: How often do you have 4 or more drinks on one occassion? 0 Filed at: 11/04/2022 0925   Audit-C Score 0 Filed at: 11/04/2022 1006   PAMELA: How many times in the past year have you    Used an illegal drug or used a prescription medication for non-medical reasons? Never Filed at: 11/04/2022 8388                MDM  Number of Diagnoses or Management Options  Hyperglycemia  Diagnosis management comments: 22 yo male with a PMH of DM type 2 and cardiac surgery (2016), comes to the ED due to 1 day of headache, nausea and abdominal pain  Denies vomiting, diarrhea, chest pain, SOB, fever/chills or urinary symptoms  Patient reports noncompliance to his home medication (Insulin glargine) for the past 3 weeks  Upon encounter, his blood glucose was >500  Due to issues with obtaining medication, CM consult was placed in order to assist and patient and Insulin Glargine was refilled  Patient was instructed to follow up with Family medicine at the [facility] to for further management        Disposition  Final diagnoses:   Hyperglycemia   DM type 1 (diabetes mellitus, type 1) ([place] [id number] )     Time reflects when diagnosis was documented in both MDM as applicable and the Disposition within this note     Time User Action Codes Description Comment    11/4/2022  1:04 PM Kumar Cure Add [E10 649] Type 1 diabetes mellitus with hypoglycemia and without coma ([facility] )     11/4/2022  1:05 PM Kumar Cure Modify [E10 649] Type 1 diabetes mellitus with hypoglycemia and without coma ([facility] )     11/4/2022  1:05 PM Daved Rock Modify [E10 649] Type 1 diabetes mellitus with hypoglycemia and without coma ([facility] )     11/4/2022  1:05 PM Daved Rock Add [R73 9] Hyperglycemia     11/4/2022  1:06 PM Daved Rock Add [E10 9] DM type 1 (diabetes mellitus, type 1) [facility])       ED Disposition     ED Disposition   Discharge    Condition   Stable    Date/Time   Fri Nov 4, 2022  1:07 PM    [address]  discharge to home/self care  Follow-up Information     Follow up With Specialties Details Why Contact Info Additional [address] [facility] Medicine Follow up Please follow up with [facility] medical practice for your health and diabetes management  Thank you so much [address], [address] [facility], [address], [address], [place], [address]          Patient's Medications   Discharge Prescriptions    No medications on file         PDMP Review     None           ED Provider  Attending physically available and evaluated [facility]  I managed the patient along with the ED Attending      Electronically Signed by         Veronica Cotton MD  11/04/22 7709

## 2022-11-04 NOTE — Clinical Note
Delmy Brown was seen and treated in our emergency department on 11/4/2022  Diagnosis:     Christine Cedillo    He may return on this date:     Patient was seen in the Emergency Department 11/4/22     If you have any questions or concerns, please don't hesitate to call        Kristian Amin RN    ______________________________           _______________          _______________  [facility] Representative                              Date                                Time

## 2022-11-04 NOTE — ED PROVIDER NOTES
History  Chief Complaint   Patient presents with   • Hyperglycemia - Symptomatic     Patient ran out of insulin about 3 weeks ago; c/o HA and vomiting      HPI  70-year-old man with history of type 1 insulin-dependent diabetes, medical noncompliance and subaortic membrane  status post resection presents to the ED for evaluation of headache, nausea and dry heaving  Patient reports he ran out of his insulin 3 weeks ago and has not taken it for the last 3 weeks  He reports he cannot afford the medications  He reports he overall feels unwell  He reports a bifrontal headache  He reports feeling nauseated, having low appetite  He denies any vomiting or diarrhea  Patient denies headache, visual changes, dizziness, fevers, chills, chest pain, palpitations, abdominal pain, melena, hematochezia, dysuria, new skin rashes or numbness or tingling of the extremities  Prior to Admission Medications   Prescriptions Last Dose Informant Patient Reported? Taking?   insulin glargine (Semglee) 100 units/mL subcutaneous injection   No No   Sig: Inject 20 Units under the skin daily at bedtime   insulin glargine (Semglee) 100 units/mL subcutaneous injection   No Yes   Sig: Inject 20 Units under the skin daily at bedtime   omeprazole (PriLOSEC) 20 mg delayed release capsule   No No   Sig: Take 1 capsule (20 mg total) by mouth daily      Facility-Administered Medications: None       Past Medical History:   Diagnosis Date   • Glaucoma    • Subaortic membrane        Past Surgical History:   Procedure Laterality Date   • CARDIAC SURGERY         History reviewed  No pertinent family history  I have reviewed and agree with the history as documented      E-Cigarette/Vaping   • E-Cigarette Use Never User      E-Cigarette/Vaping Substances   • Nicotine No    • THC No    • CBD No    • Flavoring No    • Other No    • Unknown No      Social History     Tobacco Use   • Smoking status: Former Smoker     Quit date: 2018     Years since quitting: 4  8   • Smokeless tobacco: Never Used   Vaping Use   • Vaping Use: Never used   Substance Use Topics   • Alcohol use: Not Currently   • Drug use: Not Currently       Review of Systems   All other systems reviewed and are negative  Physical Exam  Physical Exam    Vital Signs  ED Triage Vitals   Temperature Pulse Respirations Blood Pressure SpO2   11/04/22 0757 11/04/22 0757 11/04/22 0757 11/04/22 0757 11/04/22 0757   97 9 °F (36 6 °C) 85 16 90/59 100 %      Temp Source Heart Rate Source Patient Position - Orthostatic VS BP Location FiO2 (%)   11/04/22 0757 11/04/22 0757 11/04/22 0757 11/04/22 0757 --   Oral Monitor Sitting Left arm       Pain Score       11/04/22 0815       7           Vitals:    11/04/22 0900 11/04/22 0930 11/04/22 1104 11/04/22 1130   BP: 104/60 111/66 107/63 114/63   Pulse: 79 85 84 84   Patient Position - Orthostatic VS: Lying Lying Lying Lying         PHYSICAL EXAM    Constitutional:  Well developed, well nourished, no acute distress, non-toxic appearance    HEENT:  Conjunctiva normal  Oropharynx moist  Respiratory:  No respiratory distress, normal breath sounds  Cardiovascular:  Normal rate, normal rhythm, no murmurs  GI:  Soft, nondistended, normal bowel sounds, nontender  :  No costovertebral angle tenderness   Musculoskeletal:  No edema, no tenderness, no deformities     Integument:  Well hydrated, no rash   Lymphatic:  No lymphadenopathy noted   Neurologic:  Alert & oriented, normal motor function, normal sensory function, no focal deficits noted   Psychiatric:  Speech and behavior appropriate     ED Medications  Medications   sodium chloride (PF) 0 9 % injection 3 mL (has no administration in time range)   sodium chloride 0 9 % bolus 1,000 mL (0 mL Intravenous Stopped 11/4/22 1105)   insulin regular (HumuLIN R,NovoLIN R) injection 10 Units (10 Units Intravenous Given 11/4/22 0922)   sodium chloride 0 9 % bolus 1,000 mL (0 mL Intravenous Stopped 11/4/22 1240)   insulin lispro (HumaLOG) 100 units/mL subcutaneous injection 10 Units (10 Units Subcutaneous Given 11/4/22 1127)       Diagnostic Studies  Results Reviewed     Procedure Component Value Units Date/Time    Fingerstick Glucose (POCT) [[id number]]  (Abnormal) Collected: 11/04/22 1211    Lab Status: Final result Updated: 11/04/22 1213     POC Glucose 248 mg/dl     Basic metabolic panel [[id number]]  (Abnormal) Collected: 11/04/22 1130    Lab Status: Final result Specimen: Blood from Arm, Left Updated: 11/04/22 1152     Sodium 134 mmol/L      Potassium 4 0 mmol/L      Chloride 106 mmol/L      CO2 22 mmol/L      ANION GAP 6 mmol/L      BUN 17 mg/dL      Creatinine 0 52 mg/dL      Glucose 355 mg/dL      Calcium 8 1 mg/dL      eGFR 149 ml/min/1 73sq m     Narrative:      [place] guidelines for Chronic Kidney Disease (CKD):   •  Stage 1 with normal or high GFR (GFR > 90 mL/min/1 73 square meters)  •  Stage 2 Mild CKD (GFR = 60-89 mL/min/1 73 square meters)  •  Stage 3A Moderate CKD (GFR = 45-59 mL/min/1 73 square meters)  •  Stage 3B Moderate CKD (GFR = 30-44 mL/min/1 73 square meters)  •  Stage 4 Severe CKD (GFR = 15-29 mL/min/1 73 square meters)  •  Stage 5 End Stage CKD (GFR <15 mL/min/1 73 square meters)  Note: GFR calculation is accurate only with a steady state creatinine    Fingerstick Glucose (POCT) [[id number]]  (Abnormal) Collected: 11/04/22 1102    Lab Status: Final result Updated: 11/04/22 1103     POC Glucose 396 mg/dl     Urine Microscopic [[id number]]  (Normal) Collected: 11/04/22 0951    Lab Status: Final result Specimen: Urine, Other Updated: 11/04/22 1100     RBC, UA None Seen /hpf      WBC, UA None Seen /hpf      Epithelial Cells None Seen /hpf      Bacteria, UA None Seen /hpf     UA w Reflex to Microscopic w Reflex to Culture [[id number]]  (Abnormal) Collected: 11/04/22 0951    Lab Status: Final result Specimen: Urine, Other Updated: 11/04/22 1014     Color, UA Straw     Clarity, UA Clear Specific [place], UA 1 010     pH, UA 5 0     Leukocytes, UA Negative     Nitrite, UA Negative     Protein, UA Negative mg/dl      Glucose, UA >=1000 (1%) mg/dl      Ketones, UA 15 (1+) mg/dl      Bilirubin, UA Negative     Occult Blood, UA Negative     UROBILINOGEN UA Negative mg/dL     Fingerstick Glucose (POCT) [[id number]]  (Abnormal) Collected: 11/04/22 0955    Lab Status: Final result Updated: 11/04/22 0957     POC Glucose 395 mg/dl     Lipase [[id number]]  (Normal) Collected: 11/04/22 0817    Lab Status: Final result Specimen: Blood from Arm, Left Updated: 11/04/22 0841     Lipase 144 u/L     Comprehensive metabolic panel [[id number]]  (Abnormal) Collected: 11/04/22 0817    Lab Status: Final result Specimen: Blood from Arm, Left Updated: 11/04/22 0840     Sodium 131 mmol/L      Potassium 4 2 mmol/L      Chloride 98 mmol/L      CO2 22 mmol/L      ANION GAP 11 mmol/L      BUN 18 mg/dL      Creatinine 0 63 mg/dL      Glucose 568 mg/dL      Calcium 8 5 mg/dL      AST 27 U/L      ALT 28 U/L      Alkaline Phosphatase 112 U/L      Total Protein 6 8 g/dL      Albumin 4 0 g/dL      Total Bilirubin 0 35 mg/dL      eGFR 137 ml/min/1 73sq m     Narrative:      [place] guidelines for Chronic Kidney Disease (CKD):   •  Stage 1 with normal or high GFR (GFR > 90 mL/min/1 73 square meters)  •  Stage 2 Mild CKD (GFR = 60-89 mL/min/1 73 square meters)  •  Stage 3A Moderate CKD (GFR = 45-59 mL/min/1 73 square meters)  •  Stage 3B Moderate CKD (GFR = 30-44 mL/min/1 73 square meters)  •  Stage 4 Severe CKD (GFR = 15-29 mL/min/1 73 square meters)  •  Stage 5 End Stage CKD (GFR <15 mL/min/1 73 square meters)  Note: GFR calculation is accurate only with a steady state creatinine    Beta Hydroxybutyrate [[id number]]  (Abnormal) Collected: 11/04/22 0817    Lab Status: Final result Specimen: Blood from Arm, Left Updated: 11/04/22 0828     BETA-HYDROXYBUTYRATE 1 1 mmol/L     Blood gas, venous [[id number]] (Abnormal) Collected: 11/04/22 0817    Lab Status: Final result Specimen: Blood from Arm, Left Updated: 11/04/22 0825     pH, Oli 7 331     pCO2, Oli 43 1 mm Hg      pO2, Oli 62 9 mm Hg      HCO3, Oli 22 3 mmol/L      Base Excess, Oli -3 6 mmol/L      O2 Content, Oli 18 0 ml/dL      O2 HGB, VENOUS 86 9 %     CBC [[id number]]  (Abnormal) Collected: 11/04/22 0817    Lab Status: Final result Specimen: Blood from Arm, Left Updated: 11/04/22 0823     WBC 5 96 Thousand/uL      RBC 4 74 Million/uL      Hemoglobin 14 0 g/dL      Hematocrit 41 0 %      MCV 87 fL      MCH 29 5 pg      MCHC 34 1 g/dL      RDW 11 4 %      Platelets 377 Thousands/uL      MPV 10 4 fL     Fingerstick Glucose (POCT) [[id number]]  (Abnormal) Collected: 11/04/22 0756    Lab Status: Final result Updated: 11/04/22 0756     POC Glucose >500 mg/dl                  No orders to display              Procedures  Procedures         ED Course                               SBIRT 22yo+    Flowsheet Row Most Recent Value   SBIRT (23 yo +)    In order to provide better care to our patients, we are screening all of our patients for alcohol and drug use  Would it be okay to ask you these screening questions? Yes Filed at: 11/04/2022 7928   Initial Alcohol Screen: US AUDIT-C     1  How often do you have a drink containing alcohol? 0 Filed at: 11/04/2022 0925   2  How many drinks containing alcohol do you have on a typical day you are drinking? 0 Filed at: 11/04/2022 0925   3a  Male UNDER 65: How often do you have five or more drinks on one occasion? 0 Filed at: 11/04/2022 0925   3b  FEMALE Any Age, or MALE 65+: How often do you have 4 or more drinks on one occassion? 0 Filed at: 11/04/2022 0925   Audit-C Score 0 Filed at: 11/04/2022 3347   PAMELA: How many times in the past year have you    Used an illegal drug or used a prescription medication for non-medical reasons?  Never Filed at: 11/04/2022 8669                    MDM  Number of Diagnoses or Management Options  DM type 1 (diabetes mellitus, type 1) ([place] [address] )  Hyperglycemia  Diagnosis management comments: Patient was treated in the ED and his symptoms resolved, at which point he asked to be discharged  Patient declined admission  He stated he needs to go to work today and can not stay in the hospital   Patient reports the risks of not staying in the hospital including worsening hyperglycemia, ketoacidosis, acidosis, JEFFREY, coma and death  I discussed the patient with , they will provide funding for patient's insulin  I contacted Family Medicine who arranged outpatient insulin for patient, which the patient will  today  They also arrange outpatient follow-up  Patient was walked to the pharmacy to  his medications  Strict return precautions were discussed and patient reported understanding  Disposition  Final diagnoses:   Hyperglycemia   DM type 1 (diabetes mellitus, type 1) ([place] [address] )     Time reflects when diagnosis was documented in both MDM as applicable and the Disposition within this note     Time User Action Codes Description Comment    11/4/2022  1:04 PM Kumar Cure Add [E10 649] Type 1 diabetes mellitus with hypoglycemia and without coma ([place] [address] )     11/4/2022  1:05 PM Kumar Cure Modify [E10 649] Type 1 diabetes mellitus with hypoglycemia and without coma ([place] [address] )     11/4/2022  1:05 PM Kumar Cure Modify [E10 649] Type 1 diabetes mellitus with hypoglycemia and without coma ([place] [address] )     11/4/2022  1:05 PM Kumar Cure Add [R73 9] Hyperglycemia     11/4/2022  1:06 PM Kumar Cure Add [E10 9] DM type 1 (diabetes mellitus, type 1) [facility])       ED Disposition     ED Disposition   Discharge    Condition   Stable    Date/Time   Fri Nov 4, 2022  1:07 PM    Comment   Gadiel Spence 5 discharge to home/self care                 Follow-up Information     Follow up With Specialties Details Why Contact Info Additional Alex [address] Follow up Please follow up with [facility] medical practice for your health and diabetes management   Thank you so much [address], [address] [facility] [address], [address], [address] [place], [place], [address]          Discharge Medication List as of 11/4/2022  1:07 PM      CONTINUE these medications which have CHANGED    Details   insulin glargine (Semglee) 100 units/mL subcutaneous injection Inject 20 Units under the skin daily at bedtime, Starting Fri 11/4/2022, Normal         CONTINUE these medications which have NOT CHANGED    Details   omeprazole (PriLOSEC) 20 mg delayed release capsule Take 1 capsule (20 mg total) by mouth daily, Starting Fri 7/29/2022, OTC                 PDMP Review     None          ED Provider  Electronically Signed by           Juhi Keller MD  11/04/22 2250

## 2022-11-04 NOTE — Clinical Note
Aggie Will was seen and treated in our emergency department on 11/4/2022  Diagnosis:     Jin Landis    He may return on this date:     Patient was seen in the Emergency Department 11/4/22     If you have any questions or concerns, please don't hesitate to call        Lloyd Edwards RN    ______________________________           _______________          _______________  [facility] Representative                              Date                                Time

## 2023-12-03 ENCOUNTER — HOSPITAL ENCOUNTER (OUTPATIENT)
Facility: HOSPITAL | Age: 23
Setting detail: OBSERVATION
Discharge: LEFT BEFORE DISCHARGE | End: 2023-12-04
Attending: EMERGENCY MEDICINE | Admitting: INTERNAL MEDICINE
Payer: COMMERCIAL

## 2023-12-03 DIAGNOSIS — R73.9 HYPERGLYCEMIA: Primary | ICD-10-CM

## 2023-12-03 DIAGNOSIS — Z59.6 PATIENT CANNOT AFFORD MEDICATIONS: ICD-10-CM

## 2023-12-03 PROBLEM — E87.1 HYPONATREMIA: Status: ACTIVE | Noted: 2023-12-03

## 2023-12-03 PROBLEM — R11.2 NAUSEA AND VOMITING: Status: ACTIVE | Noted: 2023-12-03

## 2023-12-03 LAB
ALBUMIN SERPL BCP-MCNC: 4.6 G/DL (ref 3.5–5)
ALP SERPL-CCNC: 85 U/L (ref 34–104)
ALT SERPL W P-5'-P-CCNC: 22 U/L (ref 7–52)
ANION GAP SERPL CALCULATED.3IONS-SCNC: 9 MMOL/L
AST SERPL W P-5'-P-CCNC: 15 U/L (ref 13–39)
BASE EX.OXY STD BLDV CALC-SCNC: 84.8 % (ref 60–80)
BASE EXCESS BLDV CALC-SCNC: -1.4 MMOL/L
BETA-HYDROXYBUTYRATE: 0.3 MMOL/L
BILIRUB DIRECT SERPL-MCNC: 0.07 MG/DL (ref 0–0.2)
BILIRUB SERPL-MCNC: 0.48 MG/DL (ref 0.2–1)
BUN SERPL-MCNC: 24 MG/DL (ref 5–25)
CALCIUM SERPL-MCNC: 9.3 MG/DL (ref 8.4–10.2)
CHLORIDE SERPL-SCNC: 93 MMOL/L (ref 96–108)
CO2 SERPL-SCNC: 23 MMOL/L (ref 21–32)
CREAT SERPL-MCNC: 1.03 MG/DL (ref 0.6–1.3)
ERYTHROCYTE [DISTWIDTH] IN BLOOD BY AUTOMATED COUNT: 11.4 % (ref 11.6–15.1)
GFR SERPL CREATININE-BSD FRML MDRD: 100 ML/MIN/1.73SQ M
GLUCOSE SERPL-MCNC: 382 MG/DL (ref 65–140)
GLUCOSE SERPL-MCNC: 750 MG/DL (ref 65–140)
GLUCOSE SERPL-MCNC: >500 MG/DL (ref 65–140)
HCO3 BLDV-SCNC: 23.4 MMOL/L (ref 24–30)
HCT VFR BLD AUTO: 43.1 % (ref 36.5–49.3)
HGB BLD-MCNC: 15.9 G/DL (ref 12–17)
LIPASE SERPL-CCNC: 84 U/L (ref 11–82)
MCH RBC QN AUTO: 31.6 PG (ref 26.8–34.3)
MCHC RBC AUTO-ENTMCNC: 36.9 G/DL (ref 31.4–37.4)
MCV RBC AUTO: 86 FL (ref 82–98)
O2 CT BLDV-SCNC: 19.6 ML/DL
PCO2 BLDV: 40 MM HG (ref 42–50)
PH BLDV: 7.38 [PH] (ref 7.3–7.4)
PLATELET # BLD AUTO: 333 THOUSANDS/UL (ref 149–390)
PMV BLD AUTO: 10.4 FL (ref 8.9–12.7)
PO2 BLDV: 53.8 MM HG (ref 35–45)
POTASSIUM SERPL-SCNC: 4.2 MMOL/L (ref 3.5–5.3)
PROT SERPL-MCNC: 7.3 G/DL (ref 6.4–8.4)
RBC # BLD AUTO: 5.03 MILLION/UL (ref 3.88–5.62)
SODIUM SERPL-SCNC: 125 MMOL/L (ref 135–147)
WBC # BLD AUTO: 9.74 THOUSAND/UL (ref 4.31–10.16)

## 2023-12-03 PROCEDURE — 85027 COMPLETE CBC AUTOMATED: CPT | Performed by: EMERGENCY MEDICINE

## 2023-12-03 PROCEDURE — 80048 BASIC METABOLIC PNL TOTAL CA: CPT | Performed by: EMERGENCY MEDICINE

## 2023-12-03 PROCEDURE — 83690 ASSAY OF LIPASE: CPT | Performed by: EMERGENCY MEDICINE

## 2023-12-03 PROCEDURE — 99223 1ST HOSP IP/OBS HIGH 75: CPT | Performed by: PHYSICIAN ASSISTANT

## 2023-12-03 PROCEDURE — 96374 THER/PROPH/DIAG INJ IV PUSH: CPT

## 2023-12-03 PROCEDURE — 36415 COLL VENOUS BLD VENIPUNCTURE: CPT | Performed by: EMERGENCY MEDICINE

## 2023-12-03 PROCEDURE — 99285 EMERGENCY DEPT VISIT HI MDM: CPT | Performed by: EMERGENCY MEDICINE

## 2023-12-03 PROCEDURE — 82948 REAGENT STRIP/BLOOD GLUCOSE: CPT

## 2023-12-03 PROCEDURE — 82010 KETONE BODYS QUAN: CPT | Performed by: EMERGENCY MEDICINE

## 2023-12-03 PROCEDURE — 99285 EMERGENCY DEPT VISIT HI MDM: CPT

## 2023-12-03 PROCEDURE — 82805 BLOOD GASES W/O2 SATURATION: CPT | Performed by: EMERGENCY MEDICINE

## 2023-12-03 PROCEDURE — 96375 TX/PRO/DX INJ NEW DRUG ADDON: CPT

## 2023-12-03 PROCEDURE — 96361 HYDRATE IV INFUSION ADD-ON: CPT

## 2023-12-03 PROCEDURE — 80076 HEPATIC FUNCTION PANEL: CPT | Performed by: EMERGENCY MEDICINE

## 2023-12-03 RX ORDER — ACETAMINOPHEN 325 MG/1
650 TABLET ORAL EVERY 6 HOURS PRN
Status: DISCONTINUED | OUTPATIENT
Start: 2023-12-03 | End: 2023-12-04 | Stop reason: HOSPADM

## 2023-12-03 RX ORDER — METOCLOPRAMIDE HYDROCHLORIDE 5 MG/ML
10 INJECTION INTRAMUSCULAR; INTRAVENOUS EVERY 6 HOURS PRN
Status: DISCONTINUED | OUTPATIENT
Start: 2023-12-03 | End: 2023-12-04 | Stop reason: HOSPADM

## 2023-12-03 RX ORDER — ONDANSETRON 2 MG/ML
4 INJECTION INTRAMUSCULAR; INTRAVENOUS ONCE
Status: COMPLETED | OUTPATIENT
Start: 2023-12-03 | End: 2023-12-03

## 2023-12-03 RX ORDER — KETOROLAC TROMETHAMINE 30 MG/ML
15 INJECTION, SOLUTION INTRAMUSCULAR; INTRAVENOUS ONCE
Status: COMPLETED | OUTPATIENT
Start: 2023-12-03 | End: 2023-12-03

## 2023-12-03 RX ORDER — INSULIN LISPRO 100 [IU]/ML
6 INJECTION, SOLUTION INTRAVENOUS; SUBCUTANEOUS ONCE
Status: COMPLETED | OUTPATIENT
Start: 2023-12-03 | End: 2023-12-03

## 2023-12-03 RX ORDER — PANTOPRAZOLE SODIUM 20 MG/1
20 TABLET, DELAYED RELEASE ORAL
Status: DISCONTINUED | OUTPATIENT
Start: 2023-12-04 | End: 2023-12-04 | Stop reason: HOSPADM

## 2023-12-03 RX ORDER — SODIUM CHLORIDE 9 MG/ML
250 INJECTION, SOLUTION INTRAVENOUS CONTINUOUS
Status: DISCONTINUED | OUTPATIENT
Start: 2023-12-04 | End: 2023-12-04 | Stop reason: HOSPADM

## 2023-12-03 RX ORDER — INSULIN ASPART 100 [IU]/ML
8 INJECTION, SOLUTION INTRAVENOUS; SUBCUTANEOUS
COMMUNITY
Start: 2023-11-25

## 2023-12-03 RX ORDER — ACETAMINOPHEN 325 MG/1
975 TABLET ORAL ONCE
Status: COMPLETED | OUTPATIENT
Start: 2023-12-03 | End: 2023-12-03

## 2023-12-03 RX ORDER — INSULIN LISPRO 100 [IU]/ML
1-6 INJECTION, SOLUTION INTRAVENOUS; SUBCUTANEOUS
Status: DISCONTINUED | OUTPATIENT
Start: 2023-12-04 | End: 2023-12-04 | Stop reason: HOSPADM

## 2023-12-03 RX ORDER — SODIUM CHLORIDE 9 MG/ML
2000 INJECTION, SOLUTION INTRAVENOUS CONTINUOUS
Status: DISPENSED | OUTPATIENT
Start: 2023-12-03 | End: 2023-12-03

## 2023-12-03 RX ORDER — INSULIN LISPRO 100 [IU]/ML
1-5 INJECTION, SOLUTION INTRAVENOUS; SUBCUTANEOUS
Status: DISCONTINUED | OUTPATIENT
Start: 2023-12-04 | End: 2023-12-04 | Stop reason: HOSPADM

## 2023-12-03 RX ORDER — SODIUM CHLORIDE 9 MG/ML
500 INJECTION, SOLUTION INTRAVENOUS CONTINUOUS
Status: DISPENSED | OUTPATIENT
Start: 2023-12-03 | End: 2023-12-04

## 2023-12-03 RX ORDER — SODIUM CHLORIDE 9 MG/ML
3 INJECTION INTRAVENOUS
Status: DISCONTINUED | OUTPATIENT
Start: 2023-12-03 | End: 2023-12-04 | Stop reason: HOSPADM

## 2023-12-03 RX ORDER — INSULIN GLARGINE 100 [IU]/ML
10 INJECTION, SOLUTION SUBCUTANEOUS
Status: DISCONTINUED | OUTPATIENT
Start: 2023-12-03 | End: 2023-12-04 | Stop reason: HOSPADM

## 2023-12-03 RX ADMIN — SODIUM CHLORIDE 500 ML/HR: 0.9 INJECTION, SOLUTION INTRAVENOUS at 23:14

## 2023-12-03 RX ADMIN — INSULIN GLARGINE 10 UNITS: 100 INJECTION, SOLUTION SUBCUTANEOUS at 23:12

## 2023-12-03 RX ADMIN — ONDANSETRON 4 MG: 2 INJECTION INTRAMUSCULAR; INTRAVENOUS at 20:16

## 2023-12-03 RX ADMIN — SODIUM CHLORIDE 500 ML/HR: 0.9 INJECTION, SOLUTION INTRAVENOUS at 21:13

## 2023-12-03 RX ADMIN — SODIUM CHLORIDE 2000 ML/HR: 0.9 INJECTION, SOLUTION INTRAVENOUS at 20:13

## 2023-12-03 RX ADMIN — ACETAMINOPHEN 325MG 975 MG: 325 TABLET ORAL at 20:21

## 2023-12-03 RX ADMIN — INSULIN LISPRO 6 UNITS: 100 INJECTION, SOLUTION INTRAVENOUS; SUBCUTANEOUS at 23:12

## 2023-12-03 RX ADMIN — KETOROLAC TROMETHAMINE 15 MG: 30 INJECTION, SOLUTION INTRAMUSCULAR; INTRAVENOUS at 20:17

## 2023-12-03 SDOH — ECONOMIC STABILITY - INCOME SECURITY: LOW INCOME: Z59.6

## 2023-12-04 LAB — GLUCOSE SERPL-MCNC: 253 MG/DL (ref 65–140)

## 2023-12-04 PROCEDURE — 82948 REAGENT STRIP/BLOOD GLUCOSE: CPT

## 2023-12-04 RX ADMIN — SODIUM CHLORIDE 250 ML/HR: 0.9 INJECTION, SOLUTION INTRAVENOUS at 01:38

## 2023-12-04 RX ADMIN — INSULIN LISPRO 2 UNITS: 100 INJECTION, SOLUTION INTRAVENOUS; SUBCUTANEOUS at 00:17

## 2023-12-04 NOTE — ASSESSMENT & PLAN NOTE
Pseudohyponatremia corrects to 135mg/dl  Ivf hydration for correction of hyperglycemia, repeat bmp in am

## 2023-12-04 NOTE — ED PROVIDER NOTES
History  Chief Complaint   Patient presents with    Hyperglycemia - Symptomatic     Pt is DM and has had no money to buy insulin in over a week. Reports has not checked blood sugar today. Reporting nausea, vomiting, dizziness, generalized weakness and headache with photosensitivity. Bgl in triage >500     21 yo M h/o IDDM, subaortic membrane s/p resection; presenting for evaluation of hyperglycemia/N/V/generalized weakness/HA. [place] speaking, [email or web address]  used to obtain history  Pt states he has not had his insulin in 1 week due to inability to afford it. Per independent review of external records:   - [facility]- hyperglycemia      Prior to Admission Medications   Prescriptions Last Dose Informant Patient Reported? Taking?   insulin aspart (NovoLOG FlexPen ReliOn) 100 UNIT/ML injection pen Past Month  Yes Yes   Sig: Inject 8 Units under the skin   insulin glargine (Semglee) 100 units/mL subcutaneous injection Past Month  No Yes   Sig: Inject 20 Units under the skin daily at bedtime   omeprazole (PriLOSEC) 20 mg delayed release capsule   No Yes   Sig: Take 1 capsule (20 mg total) by mouth daily      Facility-Administered Medications: None       Past Medical History:   Diagnosis Date    Diabetes mellitus ([address])     Glaucoma     Subaortic membrane        Past Surgical History:   Procedure Laterality Date    CARDIAC SURGERY         History reviewed. No pertinent family history. I have reviewed and agree with the history as documented.     E-Cigarette/Vaping    E-Cigarette Use Never User      E-Cigarette/Vaping Substances    Nicotine No     THC No     CBD No     Flavoring No     Other No     Unknown No      Social History     Tobacco Use    Smoking status: Former     Types: Cigarettes     Quit date: 2018     Years since quittin.9    Smokeless tobacco: Never   Vaping Use    Vaping Use: Never used   Substance Use Topics    Alcohol use: Yes     Comment: socially    Drug use: Not Currently       Review of Systems    Physical Exam  Physical Exam  Vitals and nursing note reviewed. Constitutional:       General: He is not in acute distress. Appearance: Normal appearance. He is well-developed. HENT:      Head: Normocephalic and atraumatic. Nose: Nose normal.   Eyes:      Extraocular Movements: Extraocular movements intact. Conjunctiva/sclera: Conjunctivae normal.   Cardiovascular:      Rate and Rhythm: Normal rate and regular rhythm. Heart sounds: Normal heart sounds. Pulmonary:      Effort: Pulmonary effort is normal. No respiratory distress. Breath sounds: Normal breath sounds. No stridor. No wheezing or rales. Chest:      Chest wall: No tenderness. Abdominal:      General: There is no distension. Palpations: Abdomen is soft. Tenderness: There is no abdominal tenderness. There is no guarding or rebound. Musculoskeletal:         General: No swelling, tenderness or deformity. Cervical back: Normal range of motion and neck supple. Right lower leg: No edema. Left lower leg: No edema. Skin:     General: Skin is warm and dry. Findings: No rash. Neurological:      General: No focal deficit present. Mental Status: He is alert and oriented to person, place, and time. Motor: No abnormal muscle tone. Coordination: Coordination normal.   Psychiatric:         Thought Content:  Thought content normal.         Judgment: Judgment normal.         Vital Signs  ED Triage Vitals [12/03/23 1948]   Temperature Pulse Respirations Blood Pressure SpO2   98.6 °F (37 °C) 93 19 128/74 97 %      Temp Source Heart Rate Source Patient Position - Orthostatic VS BP Location FiO2 (%)   Oral Monitor Sitting Right arm --      Pain Score       9           Vitals:    12/03/23 2030 12/03/23 2115 12/03/23 2145 12/03/23 2230   BP: 127/90 128/78 112/78 116/76   Pulse: 84 84 78 72   Patient Position - Orthostatic VS:   Sitting Sitting         Visual Acuity      ED Medications  Medications   sodium chloride (PF) 0.9 % injection 3 mL (has no administration in time range)   sodium chloride 0.9 % infusion (0 mL/hr Intravenous Stopped 12/3/23 2112)     Followed by   sodium chloride 0.9 % infusion (500 mL/hr Intravenous New Bag 12/3/23 2314)     Followed by   sodium chloride 0.9 % infusion (has no administration in time range)   insulin regular (HumuLIN R,NovoLIN R) 1 Units/mL in sodium chloride 0.9 % 100 mL infusion (0 Units/hr Intravenous Hold 12/3/23 2202)   insulin glargine (LANTUS) subcutaneous injection 10 Units 0.1 mL (10 Units Subcutaneous Given 12/3/23 2312)   acetaminophen (TYLENOL) tablet 975 mg (975 mg Oral Given 12/3/23 2021)   ketorolac (TORADOL) injection 15 mg (15 mg Intravenous Given 12/3/23 2017)   ondansetron (ZOFRAN) injection 4 mg (4 mg Intravenous Given 12/3/23 2016)   insulin lispro (HumaLOG) 100 units/mL subcutaneous injection 6 Units (6 Units Subcutaneous Given 12/3/23 2312)       Diagnostic Studies  Results Reviewed       Procedure Component Value Units Date/Time    Fingerstick Glucose (POCT) [[id number]]  (Abnormal) Collected: 12/03/23 2200    Lab Status: Final result Updated: 12/03/23 2202     POC Glucose 382 mg/dl     Basic metabolic panel [[id number]]  (Abnormal) Collected: 12/03/23 2004    Lab Status: Final result Specimen: Blood from Arm, Right Updated: 12/03/23 2103     Sodium 125 mmol/L      Potassium 4.2 mmol/L      Chloride 93 mmol/L      CO2 23 mmol/L      ANION GAP 9 mmol/L      BUN 24 mg/dL      Creatinine 1.03 mg/dL      Glucose 750 mg/dL      Calcium 9.3 mg/dL      eGFR 100 ml/min/1.73sq m     Narrative:      [place] guidelines for Chronic Kidney Disease (CKD):     Stage 1 with normal or high GFR (GFR > 90 mL/min/1.73 square meters)    Stage 2 Mild CKD (GFR = 60-89 mL/min/1.73 square meters)    Stage 3A Moderate CKD (GFR = 45-59 mL/min/1.73 square meters)    Stage 3B Moderate CKD (GFR = 30-44 mL/min/1.73 square meters)    Stage 4 Severe CKD (GFR = 15-29 mL/min/1.73 square meters)    Stage 5 End Stage CKD (GFR <15 mL/min/1.73 square meters)  Note: GFR calculation is accurate only with a steady state creatinine    Lipase [[id number]]  (Abnormal) Collected: 12/03/23 2004    Lab Status: Final result Specimen: Blood from Arm, Right Updated: 12/03/23 2100     Lipase 84 u/L     Hepatic function panel [[id number]]  (Normal) Collected: 12/03/23 2004    Lab Status: Final result Specimen: Blood from Arm, Right Updated: 12/03/23 2100     Total Bilirubin 0.48 mg/dL      Bilirubin, Direct 0.07 mg/dL      Alkaline Phosphatase 85 U/L      AST 15 U/L      ALT 22 U/L      Total Protein 7.3 g/dL      Albumin 4.6 g/dL     Beta Hydroxybutyrate [[id number]]  (Normal) Collected: 12/03/23 2004    Lab Status: Final result Specimen: Blood from Arm, Right Updated: 12/03/23 2035     BETA-HYDROXYBUTYRATE 0.3 mmol/L     Blood gas, venous [[id number]]  (Abnormal) Collected: 12/03/23 2004    Lab Status: Final result Specimen: Blood from Arm, Right Updated: 12/03/23 2032     pH, Oli 7.385     pCO2, Oli 40.0 mm Hg      pO2, Oli 53.8 mm Hg      HCO3, Oli 23.4 mmol/L      Base Excess, Oli -1.4 mmol/L      O2 Content, Oli 19.6 ml/dL      O2 HGB, VENOUS 84.8 %     CBC [[id number]]  (Abnormal) Collected: 12/03/23 2004    Lab Status: Final result Specimen: Blood from Arm, Right Updated: 12/03/23 2028     WBC 9.74 Thousand/uL      RBC 5.03 Million/uL      Hemoglobin 15.9 g/dL      Hematocrit 43.1 %      MCV 86 fL      MCH 31.6 pg      MCHC 36.9 g/dL      RDW 11.4 %      Platelets 463 Thousands/uL      MPV 10.4 fL     Fingerstick Glucose (POCT) [[id number]]  (Abnormal) Collected: 12/03/23 1952    Lab Status: Final result Updated: 12/03/23 1952     POC Glucose >500 mg/dl                    No orders to display              Procedures  Procedures         ED Course  ED Course as of 12/03/23 2319   [place] Dec 03, 2023   2013 POC Glucose(!!): >500   2038 pH, Oli: 7.385   2106 Glucose, Random(!!): 750                               SBIRT 20yo+      Flowsheet Row Most Recent Value   Initial Alcohol Screen: US AUDIT-C     1. How often do you have a drink containing alcohol? 0 Filed at: 12/03/2023 2004   2. How many drinks containing alcohol do you have on a typical day you are drinking? 0 Filed at: 12/03/2023 2004   3a. Male UNDER 65: How often do you have five or more drinks on one occasion? 0 Filed at: 12/03/2023 2004   Audit-C Score 0 Filed at: 12/03/2023 2004   PAMELA: How many times in the past year have you. .. Used an illegal drug or used a prescription medication for non-medical reasons? Never Filed at: 12/03/2023 2004                      Medical Decision Making  19 yo M presenting for evaluation of hyperglycemia/weakness/N/V/HA- will treat sx, IVF, r/o DKA, labs to r/o metabolic derangement    Symptomatic hyperglycemia without DKA and pt unable to afford insulin  Admitted for glucose control and CM    Problems Addressed:  Hyperglycemia: acute illness or injury    Amount and/or Complexity of Data Reviewed  Labs: ordered. Decision-making details documented in ED Course. Risk  OTC drugs. Prescription drug management. Decision regarding hospitalization.              Disposition  Final diagnoses:   Hyperglycemia   Patient cannot afford medications     Time reflects when diagnosis was documented in both MDM as applicable and the Disposition within this note       Time User Action Codes Description Comment    12/3/2023  9:44 PM Josiephine Satnam A Add [R73.9] Hyperglycemia     12/3/2023  9:45 PM Lem Meigs Add [Z76.0] Issue of repeat prescription for medication     12/3/2023  9:45 PM Sony Merl [Z76.0] Issue of repeat prescription for medication     12/3/2023  9:45 PM Cy Meigs Add [Z59.6] Patient cannot afford medications           ED Disposition       ED Disposition   Admit    Condition   Stable    Date/Time   Sun Dec 3, 2023 2144    Comment   Case was discussed with [facility] and the patient's admission status was agreed to be Admission Status: inpatient status to the service of Dr. Sylvia Segovia . Follow-up Information    None         Patient's Medications   Discharge Prescriptions    No medications on file       No discharge procedures on file.     PDMP Review       None            ED Provider  Electronically Signed by             Heena Damian DO  12/03/23 8449

## 2023-12-04 NOTE — NURSING NOTE
Patient wanting to leave AMA, uncooperative and insisting will buy insulin. RN educated patient of the importance of staying. Patient refused. Nurse reached out to [facility], patient caught vaping in room. Security called. [facility]lim at bedside with AMA forms.

## 2023-12-04 NOTE — DISCHARGE SUMMARY
[facility]  Discharge- Darya Geller 2000, 20 y.o. male MRN: [id number]  Unit/Bed#: [facility] -01 Encounter: [id number]  Primary Care Provider: No primary care provider on file. Date and time admitted to hospital: 12/3/2023  7:53 PM  AMA    Medical Problems       Resolved Problems  Date Reviewed: 12/3/2023   None       Discharging Physician / Practitioner: Orelia Bloch, PA-C  PCP: No primary care provider on file. Admission Date:   Admission Orders (From admission, onward)       Ordered        12/03/23 2148  Place in Observation  Once                          Discharge Date: 12/04/23    Consultations During Hospital Stay:      Procedures Performed:       Significant Findings / Test Results:   Hyperglycemia in 700s no DKA. Incidental Findings:          Test Results Pending at Discharge (will require follow up): Outpatient Tests Requested:      Complications:      Reason for Admission: hyperglycemia 2* type 1 dm    Hospital Course:   Darya Geller is a 20 y.o. male patient who originally presented to the hospital on 12/3/2023 due to t1dm w/n/v and dizziness and does not have his insulin and also had no insurance. Pt was found to have BS in 700s, no DKA. N/v dizziness had improved but w/BS admission was requested. Pt rec'd aggressive ivf hydration and was given insulin bolus not drip due to rapid correction of his BS w/ivf hydration. Pt's appetite improved there was no further n/v/dizziness and pt was hungry and wanted to eat. His sugars improved to 250s w/plan to transition to home long acting insulin 10 units at night and 7 iu tid ac of short acting prior to CM evaluation so he could be started on dosing that would work with relion while CM helped evaluate for PATHS candidacy. Pt was found vaping in his room with his friend. He was asked to stop smoking which he did but then wanted to leave AMA.   He was oriented and risks were d/w pt w/leaving AMA for which he did sign the form. IV was removed and pt left AMA. Please see above list of diagnoses and related plan for additional information. Condition at Discharge: guarded    Discharge Day Visit / Exam:   * Please refer to separate progress note for these details *    Discussion with Family:     Discharge instructions/Information to patient and family:   See after visit summary for information provided to patient and family. Provisions for Follow-Up Care:  See after visit summary for information related to follow-up care and any pertinent home health orders. Mobility at time of Discharge:   Basic Mobility Inpatient Raw Score: 24  -[facility] Goal: 8: Walk 250 feet or more       Disposition:   AMA    Planned Readmission:      Discharge Statement:  I spent  minutes discharging the patient. This time was spent on the day of discharge. I had direct contact with the patient on the day of discharge. Greater than 50% of the total time was spent examining patient, answering all patient questions, arranging and discussing plan of care with patient as well as directly providing post-discharge instructions. Additional time then spent on discharge activities. Discharge Medications:  See after visit summary for reconciled discharge medications provided to patient and/or family.       **Please Note: This note may have been constructed using a voice recognition system**

## 2023-12-04 NOTE — ASSESSMENT & PLAN NOTE
Lab Results   Component Value Date    HGBA1C 13.9 (H) 07/29/2022       Recent Labs     12/03/23 1952 12/03/23 2200   POCGLU >500* 382*       Blood Sugar Average: Last 72 hrs:  (P) 382  Prior poorly controlled dm  Pt reports he takes 10 of long acting at night and 7 short acting with meals last taken the week prior. He reports he can't afford his insulin and doesn't have medical insurance in this state. BS were in 700s w/o DKA. Improved w/aggressive ivf to 300s. Hold insulin gtt give subq insulin 7 units now and lantus 10 iu and repeat bg in 2 hours  If improving still w/ivf can likely eat and resume 7 iu tid ac humalog and lantus 10 iu  Consult CM for assistance w/PATHS.  [facility]   Admit to observation

## 2023-12-04 NOTE — PLAN OF CARE

## 2023-12-04 NOTE — H&P
[facility]  H&P  Name: Jagjit Sutherland 20 y.o. male I MRN: [id number]  Unit/Bed#: ED-29 I Date of Admission: 12/3/2023   Date of Service: 12/3/2023 I Hospital Day: 0      Assessment/Plan   * Type 1 diabetes mellitus with hyperglycemia [facility])  Assessment & Plan  Lab Results   Component Value Date    HGBA1C 13.9 (H) 07/29/2022       Recent Labs     12/03/23 1952 12/03/23  2200   POCGLU >500* 382*       Blood Sugar Average: Last 72 hrs:  (P) 382  Prior poorly controlled dm  Pt reports he takes 10 of long acting at night and 7 short acting with meals last taken the week prior. He reports he can't afford his insulin and doesn't have medical insurance in this state. BS were in 700s w/o DKA. Improved w/aggressive ivf to 300s. Hold insulin gtt give subq insulin 7 units now and lantus 10 iu and repeat bg in 2 hours  If improving still w/ivf can likely eat and resume 7 iu tid ac humalog and lantus 10 iu  Consult CM for assistance w/PATHS. May require relion from [facility] at time of d/c   Admit to observation      Nausea and vomiting  Assessment & Plan  W/o lab evidence of DKA. Improved. Reglan IV prn    Hyponatremia  Assessment & Plan  Pseudohyponatremia corrects to 135mg/dl  Ivf hydration for correction of hyperglycemia, repeat bmp in am           VTE Pharmacologic Prophylaxis:   Low Risk (Score 0-2) - Encourage Ambulation. Code Status: fc  Discussion with family:     Anticipated Length of Stay: Patient will be admitted on an observation basis with an anticipated length of stay of less than 2 midnights secondary to hyperglycemia w/o DKA. Total Time Spent on Date of Encounter in care of patient:  mins.  This time was spent on one or more of the following: performing physical exam; counseling and coordination of care; obtaining or reviewing history; documenting in the medical record; reviewing/ordering tests, medications or procedures; communicating with other healthcare professionals and discussing with patient's family/caregivers. Chief Complaint: abd pain n/v lightheadedness    History of Present Illness:  Ras Feng is a 20 y.o. male with a PMH of type 1 DM who presents with abd pain n/v lightheadedness blurry vision. Pt is [place] speaking only. Hpi constructed by d/w pt and myself in [place]. Pt notes he has not had any of his insulin since last Thursday. He reports he takes 7 units of one insulin when he eats and 10 units of the other at night. He has his green card and other documents but does not have medical insurance. He came in for abd discomfort n/v and lightheadedness/blurry vision which have improved w/aggressive ivf hydration in ed. Pt bs was 700 but thankfully no AG elevation or acidosis. Admission was requested for dm 1 w/severe hyperglycemia. .    Review of Systems:  Review of Systems   Gastrointestinal:  Positive for abdominal pain, nausea and vomiting. Neurological:  Positive for light-headedness. All other systems reviewed and are negative. Past Medical and Surgical History:   Past Medical History:   Diagnosis Date    Diabetes mellitus ([address])     Glaucoma     Subaortic membrane        Past Surgical History:   Procedure Laterality Date    CARDIAC SURGERY         Meds/Allergies:  Prior to Admission medications    Medication Sig Start Date End Date Taking?  Authorizing Provider   insulin aspart (NovoLOG FlexPen ReliOn) 100 UNIT/ML injection pen Inject 8 Units under the skin 11/25/23  Yes Historical Provider, MD   insulin glargine (Semglee) 100 units/mL subcutaneous injection Inject 20 Units under the skin daily at bedtime 11/4/22  Yes Eduardo Kim MD   omeprazole (PriLOSEC) 20 mg delayed release capsule Take 1 capsule (20 mg total) by mouth daily 7/29/22  Yes JAYE Dumont   insulin degludec [place] FlexTouch) 100 units/mL injection pen Inject 20 Units under the skin daily 9/12/22 9/12/22  Luis Marie, DO     I have reviewed home medications with patient personally. Allergies: No Known Allergies    Social History:  Marital Status: Single   Occupation:   Patient Pre-hospital Living Situation:   Patient Pre-hospital Level of Mobility:   Patient Pre-hospital Diet Restrictions:   Substance Use History:   Social History     Substance and Sexual Activity   Alcohol Use Yes    Comment: socially     Social History     Tobacco Use   Smoking Status Former    Types: Cigarettes    Quit date:     Years since quittin.9   Smokeless Tobacco Never     Social History     Substance and Sexual Activity   Drug Use Not Currently       Family History:  History reviewed. No pertinent family history. Physical Exam:     Vitals:   Blood Pressure: 116/76 (23)  Pulse: 72 (23)  Temperature: 98.6 °F (37 °C) (23)  Temp Source: Oral (23)  Respirations: 20 (23)  Weight - Scale: 56.6 kg (124 lb 12.5 oz) (23)  SpO2: 100 % (23)    Physical Exam  Vitals reviewed. Constitutional:       General: He is not in acute distress. Appearance: He is not toxic-appearing or diaphoretic. HENT:      Head: Normocephalic and atraumatic. Right Ear: External ear normal.      Left Ear: External ear normal.      Nose: Nose normal.   Eyes:      Extraocular Movements: Extraocular movements intact. Cardiovascular:      Rate and Rhythm: Normal rate and regular rhythm. Heart sounds: No murmur heard. No friction rub. No gallop. Pulmonary:      Breath sounds: No wheezing, rhonchi or rales. Abdominal:      General: There is no distension. Palpations: Abdomen is soft. There is no mass. Tenderness: There is no abdominal tenderness. There is no guarding or rebound. Hernia: No hernia is present. Musculoskeletal:      Right lower leg: No edema. Left lower leg: No edema. Skin:     General: Skin is warm. Neurological:      Mental Status: He is alert.  Mental status is at baseline. Psychiatric:         Mood and Affect: Mood normal.          Additional Data:     Lab Results:  Results from last 7 days   Lab Units 12/03/23 2004   WBC Thousand/uL 9.74   HEMOGLOBIN g/dL 15.9   HEMATOCRIT % 43.1   PLATELETS Thousands/uL 333     Results from last 7 days   Lab Units 12/03/23 2004   SODIUM mmol/L 125*   POTASSIUM mmol/L 4.2   CHLORIDE mmol/L 93*   CO2 mmol/L 23   BUN mg/dL 24   CREATININE mg/dL 1.03   ANION GAP mmol/L 9   CALCIUM mg/dL 9.3   ALBUMIN g/dL 4.6   TOTAL BILIRUBIN mg/dL 0.48   ALK PHOS U/L 85   ALT U/L 22   AST U/L 15   GLUCOSE RANDOM mg/dL 750*         Results from last 7 days   Lab Units 12/03/23  2200 12/03/23 1952   POC GLUCOSE mg/dl 382* >500*               Lines/Drains:  Invasive Devices       Peripheral Intravenous Line  Duration             Peripheral IV 12/03/23 Right Antecubital <1 day                        Imaging:   No orders to display       EKG and Other Studies Reviewed on Admission:   EKG:     ** Please Note: This note has been constructed using a voice recognition system.  **

## 2023-12-05 VITALS
RESPIRATION RATE: 16 BRPM | BODY MASS INDEX: 20.14 KG/M2 | HEART RATE: 61 BPM | WEIGHT: 124.78 LBS | OXYGEN SATURATION: 95 % | TEMPERATURE: 98.1 F | DIASTOLIC BLOOD PRESSURE: 69 MMHG | SYSTOLIC BLOOD PRESSURE: 148 MMHG

## 2024-03-09 ENCOUNTER — HOSPITAL ENCOUNTER (OUTPATIENT)
Facility: HOSPITAL | Age: 24
Setting detail: OBSERVATION
Discharge: HOME | End: 2024-03-10
Attending: EMERGENCY MEDICINE | Admitting: INTERNAL MEDICINE
Payer: COMMERCIAL

## 2024-03-09 DIAGNOSIS — E10.65 TYPE 1 DIABETES MELLITUS WITH HYPERGLYCEMIA (HCC): ICD-10-CM

## 2024-03-09 DIAGNOSIS — E11.10 DKA (DIABETIC KETOACIDOSIS) (HCC): ICD-10-CM

## 2024-03-09 DIAGNOSIS — E11.65 HYPERGLYCEMIA DUE TO DIABETES MELLITUS (HCC): Primary | ICD-10-CM

## 2024-03-09 PROBLEM — R55 SYNCOPE: Status: RESOLVED | Noted: 2022-06-18 | Resolved: 2024-03-09

## 2024-03-09 PROBLEM — R11.2 NAUSEA AND VOMITING: Status: RESOLVED | Noted: 2023-12-03 | Resolved: 2024-03-09

## 2024-03-09 PROBLEM — R79.89 PSEUDOHYPONATREMIA: Status: ACTIVE | Noted: 2023-12-03

## 2024-03-09 PROBLEM — R73.9 HYPERGLYCEMIA: Status: RESOLVED | Noted: 2022-06-18 | Resolved: 2024-03-09

## 2024-03-09 LAB
ALBUMIN SERPL BCP-MCNC: 4.1 G/DL (ref 3.5–5)
ALP SERPL-CCNC: 90 U/L (ref 34–104)
ALT SERPL W P-5'-P-CCNC: 17 U/L (ref 7–52)
ANION GAP SERPL CALCULATED.3IONS-SCNC: 12 MMOL/L
ANION GAP SERPL CALCULATED.3IONS-SCNC: 17 MMOL/L
AST SERPL W P-5'-P-CCNC: 14 U/L (ref 13–39)
B-OH-BUTYR SERPL-MCNC: 3.71 MMOL/L (ref 0.02–0.27)
BACTERIA UR QL AUTO: NORMAL /HPF
BASE EX.OXY STD BLDV CALC-SCNC: 87.2 % (ref 60–80)
BASE EX.OXY STD BLDV CALC-SCNC: 90.9 % (ref 60–80)
BASE EXCESS BLDV CALC-SCNC: -2.3 MMOL/L
BASE EXCESS BLDV CALC-SCNC: -4.1 MMOL/L
BILIRUB SERPL-MCNC: 0.53 MG/DL (ref 0.2–1)
BILIRUB UR QL STRIP: NEGATIVE
BUN SERPL-MCNC: 15 MG/DL (ref 5–25)
BUN SERPL-MCNC: 16 MG/DL (ref 5–25)
CALCIUM SERPL-MCNC: 8.5 MG/DL (ref 8.4–10.2)
CALCIUM SERPL-MCNC: 9.5 MG/DL (ref 8.4–10.2)
CHLORIDE SERPL-SCNC: 100 MMOL/L (ref 96–108)
CHLORIDE SERPL-SCNC: 89 MMOL/L (ref 96–108)
CLARITY UR: CLEAR
CO2 SERPL-SCNC: 20 MMOL/L (ref 21–32)
CO2 SERPL-SCNC: 21 MMOL/L (ref 21–32)
COLOR UR: ABNORMAL
CREAT SERPL-MCNC: 0.86 MG/DL (ref 0.6–1.3)
CREAT SERPL-MCNC: 1.15 MG/DL (ref 0.6–1.3)
ERYTHROCYTE [DISTWIDTH] IN BLOOD BY AUTOMATED COUNT: 11.8 % (ref 11.6–15.1)
GFR SERPL CREATININE-BSD FRML MDRD: 119 ML/MIN/1.73SQ M
GFR SERPL CREATININE-BSD FRML MDRD: 87 ML/MIN/1.73SQ M
GLUCOSE SERPL-MCNC: 317 MG/DL (ref 65–140)
GLUCOSE SERPL-MCNC: 394 MG/DL (ref 65–140)
GLUCOSE SERPL-MCNC: 396 MG/DL (ref 65–140)
GLUCOSE SERPL-MCNC: 435 MG/DL (ref 65–140)
GLUCOSE SERPL-MCNC: 465 MG/DL (ref 65–140)
GLUCOSE SERPL-MCNC: 783 MG/DL (ref 65–140)
GLUCOSE SERPL-MCNC: >500 MG/DL (ref 65–140)
GLUCOSE UR STRIP-MCNC: ABNORMAL MG/DL
HCO3 BLDV-SCNC: 21.1 MMOL/L (ref 24–30)
HCO3 BLDV-SCNC: 23.4 MMOL/L (ref 24–30)
HCT VFR BLD AUTO: 41 % (ref 36.5–49.3)
HGB BLD-MCNC: 15 G/DL (ref 12–17)
HGB UR QL STRIP.AUTO: NEGATIVE
KETONES UR STRIP-MCNC: ABNORMAL MG/DL
LEUKOCYTE ESTERASE UR QL STRIP: NEGATIVE
LIPASE SERPL-CCNC: 50 U/L (ref 11–82)
MAGNESIUM SERPL-MCNC: 1.9 MG/DL (ref 1.9–2.7)
MCH RBC QN AUTO: 30.6 PG (ref 26.8–34.3)
MCHC RBC AUTO-ENTMCNC: 36.6 G/DL (ref 31.4–37.4)
MCV RBC AUTO: 84 FL (ref 82–98)
NITRITE UR QL STRIP: NEGATIVE
NON-SQ EPI CELLS URNS QL MICRO: NORMAL /HPF
O2 CT BLDV-SCNC: 17.7 ML/DL
O2 CT BLDV-SCNC: 18.6 ML/DL
OTHER STN SPEC: NORMAL
PCO2 BLDV: 39.1 MM HG (ref 42–50)
PCO2 BLDV: 43.7 MM HG (ref 42–50)
PH BLDV: 7.35 [PH] (ref 7.3–7.4)
PH BLDV: 7.35 [PH] (ref 7.3–7.4)
PH UR STRIP.AUTO: 6 [PH]
PLATELET # BLD AUTO: 311 THOUSANDS/UL (ref 149–390)
PMV BLD AUTO: 9.9 FL (ref 8.9–12.7)
PO2 BLDV: 59.2 MM HG (ref 35–45)
PO2 BLDV: 72.1 MM HG (ref 35–45)
POTASSIUM SERPL-SCNC: 4.2 MMOL/L (ref 3.5–5.3)
POTASSIUM SERPL-SCNC: 4.5 MMOL/L (ref 3.5–5.3)
PROT SERPL-MCNC: 7.2 G/DL (ref 6.4–8.4)
PROT UR STRIP-MCNC: NEGATIVE MG/DL
RBC # BLD AUTO: 4.9 MILLION/UL (ref 3.88–5.62)
RBC #/AREA URNS AUTO: NORMAL /HPF
SODIUM SERPL-SCNC: 127 MMOL/L (ref 135–147)
SODIUM SERPL-SCNC: 132 MMOL/L (ref 135–147)
SP GR UR STRIP.AUTO: 1.01 (ref 1–1.04)
UROBILINOGEN UA: NEGATIVE MG/DL
WBC # BLD AUTO: 8.71 THOUSAND/UL (ref 4.31–10.16)
WBC #/AREA URNS AUTO: NORMAL /HPF

## 2024-03-09 PROCEDURE — 80053 COMPREHEN METABOLIC PANEL: CPT

## 2024-03-09 PROCEDURE — 81003 URINALYSIS AUTO W/O SCOPE: CPT

## 2024-03-09 PROCEDURE — 99285 EMERGENCY DEPT VISIT HI MDM: CPT

## 2024-03-09 PROCEDURE — 82805 BLOOD GASES W/O2 SATURATION: CPT

## 2024-03-09 PROCEDURE — 80048 BASIC METABOLIC PNL TOTAL CA: CPT

## 2024-03-09 PROCEDURE — 81001 URINALYSIS AUTO W/SCOPE: CPT

## 2024-03-09 PROCEDURE — 82948 REAGENT STRIP/BLOOD GLUCOSE: CPT

## 2024-03-09 PROCEDURE — 83735 ASSAY OF MAGNESIUM: CPT

## 2024-03-09 PROCEDURE — 36415 COLL VENOUS BLD VENIPUNCTURE: CPT

## 2024-03-09 PROCEDURE — 82010 KETONE BODYS QUAN: CPT

## 2024-03-09 PROCEDURE — 85027 COMPLETE CBC AUTOMATED: CPT

## 2024-03-09 PROCEDURE — 83690 ASSAY OF LIPASE: CPT

## 2024-03-09 PROCEDURE — 96365 THER/PROPH/DIAG IV INF INIT: CPT

## 2024-03-09 PROCEDURE — 96366 THER/PROPH/DIAG IV INF ADDON: CPT

## 2024-03-09 PROCEDURE — 96361 HYDRATE IV INFUSION ADD-ON: CPT

## 2024-03-09 RX ORDER — SODIUM CHLORIDE 9 MG/ML
250 INJECTION, SOLUTION INTRAVENOUS CONTINUOUS
Status: DISPENSED | OUTPATIENT
Start: 2024-03-10 | End: 2024-03-10

## 2024-03-09 RX ORDER — SODIUM CHLORIDE 9 MG/ML
500 INJECTION, SOLUTION INTRAVENOUS CONTINUOUS
Status: DISPENSED | OUTPATIENT
Start: 2024-03-09 | End: 2024-03-10

## 2024-03-09 RX ORDER — ONDANSETRON 2 MG/ML
4 INJECTION INTRAMUSCULAR; INTRAVENOUS EVERY 6 HOURS PRN
Status: DISCONTINUED | OUTPATIENT
Start: 2024-03-09 | End: 2024-03-10 | Stop reason: HOSPADM

## 2024-03-09 RX ORDER — SODIUM CHLORIDE 9 MG/ML
2000 INJECTION, SOLUTION INTRAVENOUS CONTINUOUS
Status: DISPENSED | OUTPATIENT
Start: 2024-03-09 | End: 2024-03-09

## 2024-03-09 RX ORDER — SODIUM CHLORIDE 9 MG/ML
3 INJECTION INTRAVENOUS
Status: DISCONTINUED | OUTPATIENT
Start: 2024-03-09 | End: 2024-03-10 | Stop reason: HOSPADM

## 2024-03-09 RX ORDER — ACETAMINOPHEN 325 MG/1
650 TABLET ORAL EVERY 6 HOURS PRN
Status: DISCONTINUED | OUTPATIENT
Start: 2024-03-09 | End: 2024-03-10 | Stop reason: HOSPADM

## 2024-03-09 RX ORDER — DEXTROSE AND SODIUM CHLORIDE 5; .9 G/100ML; G/100ML
250 INJECTION, SOLUTION INTRAVENOUS CONTINUOUS PRN
Status: DISCONTINUED | OUTPATIENT
Start: 2024-03-09 | End: 2024-03-10 | Stop reason: HOSPADM

## 2024-03-09 RX ADMIN — SODIUM CHLORIDE 2000 ML/HR: 0.9 INJECTION, SOLUTION INTRAVENOUS at 18:52

## 2024-03-09 RX ADMIN — SODIUM CHLORIDE 5.2 UNITS/HR: 9 INJECTION, SOLUTION INTRAVENOUS at 19:45

## 2024-03-09 RX ADMIN — SODIUM CHLORIDE 9 UNITS/HR: 9 INJECTION, SOLUTION INTRAVENOUS at 22:40

## 2024-03-09 RX ADMIN — SODIUM CHLORIDE 500 ML/HR: 0.9 INJECTION, SOLUTION INTRAVENOUS at 20:23

## 2024-03-09 RX ADMIN — SODIUM CHLORIDE 500 ML/HR: 0.9 INJECTION, SOLUTION INTRAVENOUS at 22:44

## 2024-03-09 RX ADMIN — INSULIN HUMAN 10 UNITS: 100 INJECTION, SOLUTION PARENTERAL at 19:46

## 2024-03-09 NOTE — LETTER
[address] MEDICAL SURGICAL UNIT  [address] [facility] [id number] [phone]  Dept: [phone]    March 10, 2024     Patient: Sagar Acuna   YOB: 2000   Date of Visit: 3/9/2024       To Whom it May Concern:    Sagar Acuna is under my professional care. He was seen in the hospital from 3/9/2024 to 03/10/24. He may return to work on 3/11/2024 without limitations.    If you have any questions or concerns, please don't hesitate to call.         Sincerely,          Norberto Gannon, DO

## 2024-03-10 VITALS
WEIGHT: 114.64 LBS | TEMPERATURE: 97.3 F | HEART RATE: 100 BPM | SYSTOLIC BLOOD PRESSURE: 98 MMHG | RESPIRATION RATE: 18 BRPM | OXYGEN SATURATION: 99 % | HEIGHT: 66 IN | DIASTOLIC BLOOD PRESSURE: 61 MMHG | BODY MASS INDEX: 18.42 KG/M2

## 2024-03-10 LAB
ANION GAP SERPL CALCULATED.3IONS-SCNC: 9 MMOL/L
BUN SERPL-MCNC: 11 MG/DL (ref 5–25)
CALCIUM SERPL-MCNC: 8.2 MG/DL (ref 8.4–10.2)
CHLORIDE SERPL-SCNC: 107 MMOL/L (ref 96–108)
CO2 SERPL-SCNC: 23 MMOL/L (ref 21–32)
CREAT SERPL-MCNC: 0.48 MG/DL (ref 0.6–1.3)
GFR SERPL CREATININE-BSD FRML MDRD: 152 ML/MIN/1.73SQ M
GLUCOSE P FAST SERPL-MCNC: 215 MG/DL (ref 65–99)
GLUCOSE SERPL-MCNC: 110 MG/DL (ref 65–140)
GLUCOSE SERPL-MCNC: 173 MG/DL (ref 65–140)
GLUCOSE SERPL-MCNC: 176 MG/DL (ref 65–140)
GLUCOSE SERPL-MCNC: 215 MG/DL (ref 65–140)
GLUCOSE SERPL-MCNC: 256 MG/DL (ref 65–140)
POTASSIUM SERPL-SCNC: 3.3 MMOL/L (ref 3.5–5.3)
SODIUM SERPL-SCNC: 139 MMOL/L (ref 135–147)

## 2024-03-10 PROCEDURE — 99223 1ST HOSP IP/OBS HIGH 75: CPT | Performed by: INTERNAL MEDICINE

## 2024-03-10 PROCEDURE — NC001 PR NO CHARGE: Performed by: INTERNAL MEDICINE

## 2024-03-10 PROCEDURE — 82948 REAGENT STRIP/BLOOD GLUCOSE: CPT

## 2024-03-10 PROCEDURE — 80048 BASIC METABOLIC PNL TOTAL CA: CPT | Performed by: PHYSICIAN ASSISTANT

## 2024-03-10 RX ORDER — INSULIN ASPART 100 [IU]/ML
8 INJECTION, SOLUTION INTRAVENOUS; SUBCUTANEOUS
Qty: 15 ML | Refills: 0 | Status: SHIPPED | OUTPATIENT
Start: 2024-03-10

## 2024-03-10 RX ORDER — LANCETS 33 GAUGE
EACH MISCELLANEOUS
Qty: 100 EACH | Refills: 0 | Status: SHIPPED | OUTPATIENT
Start: 2024-03-10

## 2024-03-10 RX ORDER — PEN NEEDLE, DIABETIC 32GX 5/32"
NEEDLE, DISPOSABLE MISCELLANEOUS
Qty: 100 EACH | Refills: 0 | Status: SHIPPED | OUTPATIENT
Start: 2024-03-10

## 2024-03-10 RX ORDER — INSULIN GLARGINE 100 [IU]/ML
20 INJECTION, SOLUTION SUBCUTANEOUS
Status: DISCONTINUED | OUTPATIENT
Start: 2024-03-10 | End: 2024-03-10 | Stop reason: HOSPADM

## 2024-03-10 RX ORDER — BLOOD-GLUCOSE METER
KIT MISCELLANEOUS
Qty: 1 KIT | Refills: 0 | Status: SHIPPED | OUTPATIENT
Start: 2024-03-10

## 2024-03-10 RX ORDER — INSULIN GLARGINE 100 [IU]/ML
20 INJECTION, SOLUTION SUBCUTANEOUS
Qty: 15 ML | Refills: 0 | Status: SHIPPED | OUTPATIENT
Start: 2024-03-10

## 2024-03-10 RX ORDER — GLUCOSAMINE HCL/CHONDROITIN SU 500-400 MG
CAPSULE ORAL
Qty: 100 EACH | Refills: 0 | Status: SHIPPED | OUTPATIENT
Start: 2024-03-10

## 2024-03-10 RX ORDER — BLOOD SUGAR DIAGNOSTIC
STRIP MISCELLANEOUS
Qty: 100 EACH | Refills: 0 | Status: SHIPPED | OUTPATIENT
Start: 2024-03-10

## 2024-03-10 RX ORDER — INSULIN LISPRO 100 [IU]/ML
8 INJECTION, SOLUTION INTRAVENOUS; SUBCUTANEOUS
Status: DISCONTINUED | OUTPATIENT
Start: 2024-03-10 | End: 2024-03-10 | Stop reason: HOSPADM

## 2024-03-10 RX ORDER — POTASSIUM CHLORIDE 20 MEQ/1
40 TABLET, EXTENDED RELEASE ORAL ONCE
Status: COMPLETED | OUTPATIENT
Start: 2024-03-10 | End: 2024-03-10

## 2024-03-10 RX ADMIN — POTASSIUM CHLORIDE 40 MEQ: 1500 TABLET, EXTENDED RELEASE ORAL at 09:00

## 2024-03-10 RX ADMIN — SODIUM CHLORIDE 250 ML/HR: 0.9 INJECTION, SOLUTION INTRAVENOUS at 00:34

## 2024-03-10 RX ADMIN — SODIUM CHLORIDE 0.5 UNITS/HR: 9 INJECTION, SOLUTION INTRAVENOUS at 08:21

## 2024-03-10 RX ADMIN — DEXTROSE AND SODIUM CHLORIDE 250 ML/HR: 5; .9 INJECTION, SOLUTION INTRAVENOUS at 01:29

## 2024-03-10 RX ADMIN — DEXTROSE AND SODIUM CHLORIDE 250 ML/HR: 5; .9 INJECTION, SOLUTION INTRAVENOUS at 05:43

## 2024-03-10 NOTE — ASSESSMENT & PLAN NOTE
Lab Results   Component Value Date    HGBA1C >16.5 (H) 01/10/2024    HGBA1C  01/10/2024     Unable to determine A1c due to interfering substance, reflexed to alternate methodology.   Patient with abdominal pain, eye pain and has not used insulin in 12 days or checked blood sugars  Patient with poorly controlled diabetes with A1C of >16.5   Continue insulin drip and hopeful resume home insulin in AM  IVF  Patient s/p IVF w/ improvement of labs, case was reviewed w/ Critical Care and felt ok to stay at [facility].

## 2024-03-10 NOTE — H&P
[facility]  H&P  Name: Sagar Acuna 24 y.o. male I MRN: [id number]  Unit/Bed#: 7T [facility] 710-01 I Date of Admission: 3/9/2024   Date of Service: 3/10/2024 I Hospital Day: 0      Assessment/Plan   * Type 1 diabetes mellitus with hyperglycemia (HCC)  Assessment & Plan    Lab Results   Component Value Date    HGBA1C >16.5 (H) 01/10/2024    HGBA1C  01/10/2024     Unable to determine A1c due to interfering substance, reflexed to alternate methodology.   Patient with abdominal pain, eye pain and has not used insulin in 12 days or checked blood sugars  Patient with poorly controlled diabetes with A1C of >16.5   Continue insulin drip and hopeful resume home insulin in AM  IVF  Patient s/p IVF w/ improvement of labs, case was reviewed w/ Critical Care and felt ok to stay at [facility].    Pseudohyponatremia  Assessment & Plan  Corrects 141         VTE Pharmacologic Prophylaxis: VTE Score: 0 Low Risk (Score 0-2) - Encourage Ambulation.  Code Status: full code  Discussion with patient    Anticipated Length of Stay: Patient will be admitted on an observation basis with an anticipated length of stay of less than 2 midnights secondary to Insulin drip, glucose monitoring.    Total Time Spent on Date of Encounter in care of patient: 45 mins. This time was spent on one or more of the following: performing physical exam; counseling and coordination of care; obtaining or reviewing history; documenting in the medical record; reviewing/ordering tests, medications or procedures; communicating with other healthcare professionals and discussing with patient's family/caregivers.    Chief Complaint: abdominal pain    History of Present Illness:  Sagar Acuna is a 24 y.o. male with a PMH of diabetes mellitus type 1, glaucoma, subaortic membrane who presents with abdominal pain. Patient presented to ED secondary to abdominal pain, eye pain and increased thirst for 2 days. Reports not taking  insulin in 12 days or checking blood sugars. Has had multiple admissions hyperglycemia.    Review of Systems:  Review of Systems   Eyes:  Positive for pain.   Gastrointestinal:  Positive for abdominal pain.   Endocrine: Positive for polydipsia.       Past Medical and Surgical History:   Past Medical History:   Diagnosis Date    Diabetes mellitus (HCC)     Glaucoma     Subaortic membrane        Past Surgical History:   Procedure Laterality Date    CARDIAC SURGERY         Meds/Allergies:  Prior to Admission medications    Medication Sig Start Date End Date Taking? Authorizing Provider   insulin aspart (NovoLOG FlexPen ReliOn) 100 UNIT/ML injection pen Inject 8 Units under the skin 23   Historical Provider, MD   insulin glargine (Semglee) 100 units/mL subcutaneous injection Inject 20 Units under the skin daily at bedtime 22   Priya Cox MD   omeprazole (PriLOSEC) 20 mg delayed release capsule Take 1 capsule (20 mg total) by mouth daily  Patient not taking: Reported on 2023   JAYE Posada   insulin degludec ([place] FlexTouch) 100 units/mL injection pen Inject 20 Units under the skin daily 22  Mandeep Franco DO     I have reviewed home medications with patient personally.    Allergies: No Known Allergies    Social History:  Marital Status: Single   Patient Pre-hospital Living Situation: Home  Patient Pre-hospital Level of Mobility: walks  Patient Pre-hospital Diet Restrictions: none  Substance Use History:   Social History     Substance and Sexual Activity   Alcohol Use Yes    Comment: socially     Social History     Tobacco Use   Smoking Status Former    Current packs/day: 0.00    Types: Cigarettes    Quit date:     Years since quittin.1   Smokeless Tobacco Never   Tobacco Comments    4 years smoker 13 to 17 years old,  quit     Social History     Substance and Sexual Activity   Drug Use Not Currently       Family History:  Family History   Problem  "Relation Age of Onset    Migraines Mother        Physical Exam:     Vitals:   Blood Pressure: 102/67 (03/10/24 0339)  Pulse: 85 (03/10/24 0339)  Temperature: 97.9 °F (36.6 °C) (03/10/24 0339)  Temp Source: Temporal (03/10/24 0339)  Respirations: 18 (03/10/24 0339)  Height: 5' 6\" (167.6 cm) (03/10/24 0100)  Weight - Scale: 52 kg (114 lb 10.2 oz) (03/10/24 0100)  SpO2: 96 % (03/10/24 0339)    Physical Exam  Vitals and nursing note reviewed.   Constitutional:       General: He is not in acute distress.     Appearance: He is well-developed.   HENT:      Head: Normocephalic and atraumatic.   Eyes:      Conjunctiva/sclera: Conjunctivae normal.   Cardiovascular:      Rate and Rhythm: Normal rate and regular rhythm.      Heart sounds: No murmur heard.  Pulmonary:      Effort: Pulmonary effort is normal. No respiratory distress.      Breath sounds: Normal breath sounds.   Abdominal:      Palpations: Abdomen is soft.      Tenderness: There is no abdominal tenderness.   Musculoskeletal:         General: No swelling.      Cervical back: Neck supple.   Skin:     General: Skin is warm and dry.      Capillary Refill: Capillary refill takes less than 2 seconds.   Neurological:      Mental Status: He is alert.   Psychiatric:         Mood and Affect: Mood normal.          Additional Data:     Lab Results:  Results from last 7 days   Lab Units 03/09/24  1841   WBC Thousand/uL 8.71   HEMOGLOBIN g/dL 15.0   HEMATOCRIT % 41.0   PLATELETS Thousands/uL 311     Results from last 7 days   Lab Units 03/09/24 2052 03/09/24  1841   SODIUM mmol/L 132* 127*   POTASSIUM mmol/L 4.2 4.5   CHLORIDE mmol/L 100 89*   CO2 mmol/L 20* 21   BUN mg/dL 15 16   CREATININE mg/dL 0.86 1.15   ANION GAP mmol/L 12 17   CALCIUM mg/dL 8.5 9.5   ALBUMIN g/dL  --  4.1   TOTAL BILIRUBIN mg/dL  --  0.53   ALK PHOS U/L  --  90   ALT U/L  --  17   AST U/L  --  14   GLUCOSE RANDOM mg/dL 465* 783*         Results from last 7 days   Lab Units 03/10/24  0538 03/10/24  0338 " 03/10/24  0125 03/09/24  2338 03/09/24  2241 03/09/24  2149 03/09/24  2049 03/09/24  1837   POC GLUCOSE mg/dl 256* 176* 173* 317* 394* 396* 435* >500*               Lines/Drains:  Invasive Devices       Peripheral Intravenous Line  Duration             Peripheral IV 03/09/24 Right;Ventral (anterior) Forearm <1 day                  EKG and Other Studies Reviewed on Admission:   EKG: No EKG obtained.    ** Please Note: This note has been constructed using a voice recognition system. **

## 2024-03-10 NOTE — PLAN OF CARE
Problem: Potential for Falls  Goal: Patient will remain free of falls  Description: INTERVENTIONS:  - Educate patient/family on patient safety including physical limitations  - Instruct patient to call for assistance with activity   - Consult OT/PT to assist with strengthening/mobility   - Keep Call bell within reach  - Keep bed low and locked with side rails adjusted as appropriate  - Keep care items and personal belongings within reach  - Initiate and maintain comfort rounds  - Make Fall Risk Sign visible to staff  - Apply yellow socks and bracelet for high fall risk patients  - Consider moving patient to room near nurses station  Outcome: Progressing     Problem: METABOLIC, FLUID AND ELECTROLYTES - ADULT  Goal: Electrolytes maintained within normal limits  Description: INTERVENTIONS:  - Monitor labs and assess patient for signs and symptoms of electrolyte imbalances  - Administer electrolyte replacement as ordered  - Monitor response to electrolyte replacements, including repeat lab results as appropriate  - Instruct patient on fluid and nutrition as appropriate  Outcome: Progressing

## 2024-03-10 NOTE — ASSESSMENT & PLAN NOTE
Lab Results   Component Value Date    HGBA1C >16.5 (H) 01/10/2024    HGBA1C  01/10/2024     Unable to determine A1c due to interfering substance, reflexed to alternate methodology.   Patient with abdominal pain, eye pain and has not used insulin in 12 days or checked blood sugars  Patient with poorly controlled diabetes with A1C of >16.5   Continue insulin drip and hopeful resume home insulin in AM  IVF  Patient s/p IVF w/ improvement of labs, case was reviewed w/ Critical Care and felt ok to stay at [facility].

## 2024-03-10 NOTE — ASSESSMENT & PLAN NOTE
Bart Capellan MD Ylitalo, Kim Michelle, EMAM Leone,     [facility].  Many of his labs were recently done for his NSTEMI in December.     I did start him on atorvastatin.  Please recheck fasting lipids and also LFTs with labs in 3 months.      LPN task:  Call Didier Bondmicah and make sure he is fasting with next lab draw (in 3 mos)  Send lab orders to check Fasting lipids and LFT's with next labs   Corrects 141

## 2024-03-10 NOTE — DISCHARGE SUMMARY
[facility]  Discharge- Sagar Acuna 2000, 24 y.o. male MRN: [id number]  Unit/Bed#: 7T [facility] 710-01 Encounter: [id number]  Primary Care Provider: No primary care provider on file.   Date and time admitted to hospital: 3/9/2024  6:30 PM    Pseudohyponatremia  Assessment & Plan  Corrects 141    * Type 1 diabetes mellitus with hyperglycemia (HCC)  Assessment & Plan    Lab Results   Component Value Date    HGBA1C >16.5 (H) 01/10/2024    HGBA1C  01/10/2024     Unable to determine A1c due to interfering substance, reflexed to alternate methodology.   Patient with abdominal pain, eye pain and has not used insulin in 12 days or checked blood sugars  Patient with poorly controlled diabetes with A1C of >16.5   Continue insulin drip and hopeful resume home insulin in AM  IVF  Patient s/p IVF w/ improvement of labs, case was reviewed w/ Critical Care and felt ok to stay at [facility].              Medical Problems       Resolved Problems  Date Reviewed: 3/10/2024   None         Admission Date:   Admission Orders (From admission, onward)       Ordered        03/09/24 2252  Place in Observation  Once                            Admitting Diagnosis: Abdominal pain [R10.9]  DKA (diabetic ketoacidosis) (HCC) [E11.10]  Hyperglycemia due to diabetes mellitus (HCC) [E11.65]    HPI: Sagar Acuna is a 24 y.o. male with a PMH of diabetes mellitus type 1, glaucoma, subaortic membrane who presents with abdominal pain. Patient presented to ED secondary to abdominal pain, eye pain and increased thirst for 2 days. Reports not taking insulin in 12 days or checking blood sugars. Has had multiple admissions hyperglycemia.     Procedures Performed: No orders of the defined types were placed in this encounter.      Summary of Hospital Course: The patient's blood glucose levels improved with insulin infusion and on the morning of 3/10/2024 insulin infusion was discontinued and the patient was  transition to his basal bolus insulin regimen.  I have sent prescriptions for refills of the patient's basal bolus insulin regimen along with diabetic supplies.  He was strongly encouraged to resume all preadmission medications at all preadmission dosages and to follow-up with his PCP within 7-14 days.    Significant Findings, Care, Treatment and Services Provided: Insulin infusion    Complications: Not applicable    Condition at Discharge: stable         Discharge instructions/Information to patient and family:   See after visit summary for information provided to patient and family.      Provisions for Follow-Up Care:  See after visit summary for information related to follow-up care and any pertinent home health orders.      PCP: No primary care provider on file.    Disposition: Home    Planned Readmission: No    Discharge Statement   I spent 45 minutes discharging the patient. This time was spent on the day of discharge. I had direct contact with the patient on the day of discharge. Additional documentation is required if more than 30 minutes were spent on discharge.     Discharge Medications:  See after visit summary for reconciled discharge medications provided to patient and family.

## 2024-03-10 NOTE — PLAN OF CARE
Problem: PAIN - ADULT  Goal: Verbalizes/displays adequate comfort level or baseline comfort level  Description: Interventions:  - Encourage patient to monitor pain and request assistance  - Assess pain using appropriate pain scale  - Administer analgesics based on type and severity of pain and evaluate response  - Implement non-pharmacological measures as appropriate and evaluate response  - Consider cultural and social influences on pain and pain management  - Notify physician/advanced practitioner if interventions unsuccessful or patient reports new pain  Outcome: Progressing     Problem: INFECTION - ADULT  Goal: Absence or prevention of progression during hospitalization  Description: INTERVENTIONS:  - Assess and monitor for signs and symptoms of infection  - Monitor lab/diagnostic results  - Monitor all insertion sites, i.e. indwelling lines, tubes, and drains  - Monitor endotracheal if appropriate and nasal secretions for changes in amount and color  - Nabb appropriate cooling/warming therapies per order  - Administer medications as ordered  - Instruct and encourage patient and family to use good hand hygiene technique  - Identify and instruct in appropriate isolation precautions for identified infection/condition  Outcome: Progressing     Problem: METABOLIC, FLUID AND ELECTROLYTES - ADULT  Goal: Electrolytes maintained within normal limits  Description: INTERVENTIONS:  - Monitor labs and assess patient for signs and symptoms of electrolyte imbalances  - Administer electrolyte replacement as ordered  - Monitor response to electrolyte replacements, including repeat lab results as appropriate  - Instruct patient on fluid and nutrition as appropriate  Outcome: Progressing     Problem: Knowledge Deficit  Goal: Patient/family/caregiver demonstrates understanding of disease process, treatment plan, medications, and discharge instructions  Description: Complete learning assessment and assess knowledge  base.  Interventions:  - Provide teaching at level of understanding  - Provide teaching via preferred learning methods  Outcome: Progressing     Problem: DISCHARGE PLANNING  Goal: Discharge to home or other facility with appropriate resources  Description: INTERVENTIONS:  - Identify barriers to discharge w/patient and caregiver  - Arrange for needed discharge resources and transportation as appropriate  - Identify discharge learning needs (meds, wound care, etc.)  - Arrange for interpretive services to assist at discharge as needed  - Refer to Case Management Department for coordinating discharge planning if the patient needs post-hospital services based on physician/advanced practitioner order or complex needs related to functional status, cognitive ability, or social support system  Outcome: Progressing

## 2024-03-10 NOTE — ED PROVIDER NOTES
"History  Chief Complaint   Patient presents with    Abdominal Pain     Reports abd pain and eye pain x2 days, reports \"needing water\" did not take anything for symptoms. Pt diabetic, has not had insulin in 12 days, has not checked sugar.     Patient is a 24-year-old male coming in for evaluation of abdominal pain, and eye pain for the last 2 days.  Reports he needs to drink water.  Has not been taking his insulin in 4 to 12 days.  Does not check his sugar.  Has history of multiple stays in the hospital for hyperglycemia.  Patient repeatedly asking for water      Abdominal Pain  Associated symptoms: fatigue and nausea        Prior to Admission Medications   Prescriptions Last Dose Informant Patient Reported? Taking?   insulin aspart (NovoLOG FlexPen ReliOn) 100 UNIT/ML injection pen   Yes No   Sig: Inject 8 Units under the skin   insulin glargine (Semglee) 100 units/mL subcutaneous injection   No No   Sig: Inject 20 Units under the skin daily at bedtime   omeprazole (PriLOSEC) 20 mg delayed release capsule   No No   Sig: Take 1 capsule (20 mg total) by mouth daily   Patient not taking: Reported on 2023      Facility-Administered Medications: None       Past Medical History:   Diagnosis Date    Diabetes mellitus (HCC)     Glaucoma     Subaortic membrane        Past Surgical History:   Procedure Laterality Date    CARDIAC SURGERY         History reviewed. No pertinent family history.  I have reviewed and agree with the history as documented.    E-Cigarette/Vaping    E-Cigarette Use Never User      E-Cigarette/Vaping Substances    Nicotine No     THC No     CBD No     Flavoring No     Other No     Unknown No      Social History     Tobacco Use    Smoking status: Former     Current packs/day: 0.00     Types: Cigarettes     Quit date:      Years since quittin.1    Smokeless tobacco: Never    Tobacco comments:     4 years smoker 13 to 17 years old,  quit   Vaping Use    Vaping status: Never Used "   Substance Use Topics    Alcohol use: Yes     Comment: socially    Drug use: Not Currently       Review of Systems   Constitutional:  Positive for fatigue.   Gastrointestinal:  Positive for abdominal pain and nausea.       Physical Exam  Physical Exam  Vitals reviewed.   Constitutional:       Appearance: Normal appearance. He is normal weight.   HENT:      Head: Normocephalic and atraumatic.      Right Ear: External ear normal.      Left Ear: External ear normal.      Nose: Nose normal.   Eyes:      Conjunctiva/sclera: Conjunctivae normal.   Cardiovascular:      Rate and Rhythm: Tachycardia present.   Pulmonary:      Effort: Pulmonary effort is normal.   Abdominal:      Palpations: Abdomen is soft.      Tenderness: There is no abdominal tenderness.   Musculoskeletal:         General: Normal range of motion.      Cervical back: Normal range of motion.   Skin:     General: Skin is warm and dry.   Neurological:      Mental Status: He is alert.         Vital Signs  ED Triage Vitals [03/09/24 1832]   Temperature Pulse Respirations Blood Pressure SpO2   98.3 °F (36.8 °C) 101 20 123/77 97 %      Temp Source Heart Rate Source Patient Position - Orthostatic VS BP Location FiO2 (%)   Oral Monitor Sitting Left arm --      Pain Score       --           Vitals:    03/09/24 1832 03/09/24 1947   BP: 123/77 115/76   Pulse: 101 90   Patient Position - Orthostatic VS: Sitting          Visual Acuity      ED Medications  Medications   sodium chloride (PF) 0.9 % injection 3 mL (has no administration in time range)   sodium chloride 0.9 % infusion (0 mL/hr Intravenous Stopped 3/9/24 2023)     Followed by   sodium chloride 0.9 % infusion (500 mL/hr Intravenous New Bag 3/9/24 2023)     Followed by   sodium chloride 0.9 % infusion (has no administration in time range)   dextrose 5 % and sodium chloride 0.9 % infusion (has no administration in time range)   acetaminophen (TYLENOL) tablet 650 mg (has no administration in time range)    ondansetron (ZOFRAN) injection 4 mg (has no administration in time range)   insulin regular (HumuLIN R,NovoLIN R) 1 Units/mL in sodium chloride 0.9 % 100 mL infusion (has no administration in time range)   insulin regular (HumuLIN R,NovoLIN R) injection 10 Units (10 Units Intravenous Given 3/9/24 1946)       Diagnostic Studies  Results Reviewed       Procedure Component Value Units Date/Time    Blood gas, venous [[id number]]  (Abnormal) Collected: 03/09/24 2154    Lab Status: Final result Specimen: Blood Updated: 03/09/24 2200     pH, Oli 7.347     pCO2, Oli 43.7 mm Hg      pO2, Oli 72.1 mm Hg      HCO3, Oli 23.4 mmol/L      Base Excess, Oli -2.3 mmol/L      O2 Content, Oli 17.7 ml/dL      O2 HGB, VENOUS 90.9 %     Basic metabolic panel [[id number]]  (Abnormal) Collected: 03/09/24 2052    Lab Status: Final result Specimen: Blood from Arm, Left Updated: 03/09/24 2122     Sodium 132 mmol/L      Potassium 4.2 mmol/L      Chloride 100 mmol/L      CO2 20 mmol/L      ANION GAP 12 mmol/L      BUN 15 mg/dL      Creatinine 0.86 mg/dL      Glucose 465 mg/dL      Calcium 8.5 mg/dL      eGFR 119 ml/min/1.73sq m     Narrative:      National Kidney Disease Foundation guidelines for Chronic Kidney Disease (CKD):     Stage 1 with normal or high GFR (GFR > 90 mL/min/1.73 square meters)    Stage 2 Mild CKD (GFR = 60-89 mL/min/1.73 square meters)    Stage 3A Moderate CKD (GFR = 45-59 mL/min/1.73 square meters)    Stage 3B Moderate CKD (GFR = 30-44 mL/min/1.73 square meters)    Stage 4 Severe CKD (GFR = 15-29 mL/min/1.73 square meters)    Stage 5 End Stage CKD (GFR <15 mL/min/1.73 square meters)  Note: GFR calculation is accurate only with a steady state creatinine    Fingerstick Glucose (POCT) [[id number]]  (Abnormal) Collected: 03/09/24 2049    Lab Status: Final result Updated: 03/09/24 2055     POC Glucose 435 mg/dl     CBC [[id number]]  (Normal) Collected: 03/09/24 1841    Lab Status: Edited Result - FINAL Specimen: Blood from Arm,  Right Updated: 03/09/24 2003     WBC 8.71 Thousand/uL      RBC 4.90 Million/uL      Hemoglobin 15.0 g/dL      Hematocrit 41.0 %      MCV 84 fL      MCH 30.6 pg      MCHC 36.6 g/dL      RDW 11.8 %      Platelets 311 Thousands/uL      MPV 9.9 fL     Urine Microscopic [[id number]] Collected: 03/09/24 1857    Lab Status: Final result Specimen: Urine, Clean Catch Updated: 03/09/24 1942     RBC, UA None Seen /hpf      WBC, UA 0-1 /hpf      Epithelial Cells None Seen /hpf      Bacteria, UA None Seen /hpf      OTHER OBSERVATIONS Yeast Cells Present    UA w Reflex to Microscopic w Reflex to Culture [[id number]]  (Abnormal) Collected: 03/09/24 1857    Lab Status: Final result Specimen: Urine, Clean Catch Updated: 03/09/24 1919     Color, UA Straw     Clarity, UA Clear     Specific Gravity, UA 1.010     pH, UA 6.0     Leukocytes, UA Negative     Nitrite, UA Negative     Protein, UA Negative mg/dl      Glucose, UA >=1000 (1%) mg/dl      Ketones, UA >=150 (3+) mg/dl      Bilirubin, UA Negative     Occult Blood, UA Negative     UROBILINOGEN UA Negative mg/dL     Comprehensive metabolic panel [[id number]]  (Abnormal) Collected: 03/09/24 1841    Lab Status: Final result Specimen: Blood from Arm, Right Updated: 03/09/24 1914     Sodium 127 mmol/L      Potassium 4.5 mmol/L      Chloride 89 mmol/L      CO2 21 mmol/L      ANION GAP 17 mmol/L      BUN 16 mg/dL      Creatinine 1.15 mg/dL      Glucose 783 mg/dL      Calcium 9.5 mg/dL      AST 14 U/L      ALT 17 U/L      Alkaline Phosphatase 90 U/L      Total Protein 7.2 g/dL      Albumin 4.1 g/dL      Total Bilirubin 0.53 mg/dL      eGFR 87 ml/min/1.73sq m     Narrative:      National Kidney Disease Foundation guidelines for Chronic Kidney Disease (CKD):     Stage 1 with normal or high GFR (GFR > 90 mL/min/1.73 square meters)    Stage 2 Mild CKD (GFR = 60-89 mL/min/1.73 square meters)    Stage 3A Moderate CKD (GFR = 45-59 mL/min/1.73 square meters)    Stage 3B Moderate CKD (GFR = 30-44  mL/min/1.73 square meters)    Stage 4 Severe CKD (GFR = 15-29 mL/min/1.73 square meters)    Stage 5 End Stage CKD (GFR <15 mL/min/1.73 square meters)  Note: GFR calculation is accurate only with a steady state creatinine    Beta Hydroxybutyrate [[id number]]  (Abnormal) Collected: 03/09/24 1841    Lab Status: Final result Specimen: Blood from Arm, Right Updated: 03/09/24 1911     Beta- Hydroxybutyrate 3.71 mmol/L     Magnesium [[id number]]  (Normal) Collected: 03/09/24 1841    Lab Status: Final result Specimen: Blood from Arm, Right Updated: 03/09/24 1911     Magnesium 1.9 mg/dL     Lipase [[id number]]  (Normal) Collected: 03/09/24 1841    Lab Status: Final result Specimen: Blood from Arm, Right Updated: 03/09/24 1911     Lipase 50 u/L     Blood gas, venous [[id number]]  (Abnormal) Collected: 03/09/24 1841    Lab Status: Final result Specimen: Blood from Arm, Right Updated: 03/09/24 1853     pH, Oli 7.350     pCO2, Oli 39.1 mm Hg      pO2, Oli 59.2 mm Hg      HCO3, Oli 21.1 mmol/L      Base Excess, Oli -4.1 mmol/L      O2 Content, Oli 18.6 ml/dL      O2 HGB, VENOUS 87.2 %     Fingerstick Glucose (POCT) [[id number]]  (Abnormal) Collected: 03/09/24 1837    Lab Status: Final result Updated: 03/09/24 1837     POC Glucose >500 mg/dl                    No orders to display              Procedures  Procedures         ED Course  ED Course as of 03/09/24 2219   Sat Mar 09, 2024   1901 pH, Oli: 7.350  Compensating. Non-acidotic   1912 Beta- Hydroxybutyrate(!): 3.71   1915 ANION GAP: 17   1926 GLUCOSE(!!): 783   2055 POC Glucose(!!): 435   2122 ANION GAP: 12                               SBIRT 20yo+      Flowsheet Row Most Recent Value   Initial Alcohol Screen: US AUDIT-C     1. How often do you have a drink containing alcohol? 0 Filed at: 03/09/2024 2003   2. How many drinks containing alcohol do you have on a typical day you are drinking?  0 Filed at: 03/09/2024 2003   3a. Male UNDER 65: How often do you have five or more drinks  on one occasion? 0 Filed at: 03/09/2024 2003   3b. FEMALE Any Age, or MALE 65+: How often do you have 4 or more drinks on one occassion? 0 Filed at: 03/09/2024 2003   Audit-C Score 0 Filed at: 03/09/2024 2003   PAMELA: How many times in the past year have you...    Used an illegal drug or used a prescription medication for non-medical reasons? Never Filed at: 03/09/2024 2003                      Medical Decision Making  Patient is a 24-year-old male who came in for evaluation of hyperglycemia.  Patient is noncompliant with his medications.  We treated patient's DKA here in the emergency room, closing his anion gap.  Decision made to admit to [facility].  Patient stable at this time, and willing to stay    Amount and/or Complexity of Data Reviewed  Labs: ordered. Decision-making details documented in ED Course.    Risk  OTC drugs.  Prescription drug management.  Decision regarding hospitalization.             Disposition  Final diagnoses:   Hyperglycemia due to diabetes mellitus (HCC)   DKA (diabetic ketoacidosis) (HCC)     Time reflects when diagnosis was documented in both MDM as applicable and the Disposition within this note       Time User Action Codes Description Comment    3/9/2024  7:17 PM Larry Peralta [E11.65] Hyperglycemia due to diabetes mellitus (HCC)     3/9/2024 10:10 PM Larry Peralta [E11.10] DKA (diabetic ketoacidosis) (HCC)           ED Disposition       ED Disposition   Admit    Condition   Stable    Date/Time   Sat Mar 9, 2024 2211    Comment   Spoke to Attila, who agrees to take patient under Dr. Norberto Gannon             Follow-up Information    None         Patient's Medications   Discharge Prescriptions    No medications on file       No discharge procedures on file.    PDMP Review       None            ED Provider  Electronically Signed by             Larry Peralta PA-C  03/09/24 5550

## 2024-08-12 ENCOUNTER — HOSPITAL ENCOUNTER (EMERGENCY)
Facility: HOSPITAL | Age: 24
Discharge: HOME | End: 2024-08-12
Attending: EMERGENCY MEDICINE

## 2024-08-12 VITALS
WEIGHT: 129.41 LBS | HEIGHT: 66 IN | TEMPERATURE: 98.5 F | RESPIRATION RATE: 18 BRPM | DIASTOLIC BLOOD PRESSURE: 81 MMHG | HEART RATE: 87 BPM | OXYGEN SATURATION: 99 % | BODY MASS INDEX: 20.8 KG/M2 | SYSTOLIC BLOOD PRESSURE: 112 MMHG

## 2024-08-12 DIAGNOSIS — R73.9 HYPERGLYCEMIA: ICD-10-CM

## 2024-08-12 DIAGNOSIS — M54.9 BACK PAIN: Primary | ICD-10-CM

## 2024-08-12 DIAGNOSIS — N50.811 PAIN IN BOTH TESTICLES: ICD-10-CM

## 2024-08-12 DIAGNOSIS — N50.812 PAIN IN BOTH TESTICLES: ICD-10-CM

## 2024-08-12 LAB
ALBUMIN SERPL BCG-MCNC: 4.1 G/DL (ref 3.5–5)
ALP SERPL-CCNC: 77 U/L (ref 34–104)
ALT SERPL W P-5'-P-CCNC: 13 U/L (ref 7–52)
ANION GAP SERPL CALCULATED.3IONS-SCNC: 5 MMOL/L (ref 4–13)
AST SERPL W P-5'-P-CCNC: 10 U/L (ref 13–39)
B-OH-BUTYR SERPL-MCNC: 0.09 MMOL/L (ref 0.02–0.27)
BACTERIA UR QL AUTO: NORMAL /HPF
BASE EX.OXY STD BLDV CALC-SCNC: 65.5 % (ref 60–80)
BASE EXCESS BLDV CALC-SCNC: 0.9 MMOL/L
BASOPHILS # BLD AUTO: 0.04 THOUSANDS/ÂΜL (ref 0–0.1)
BASOPHILS NFR BLD AUTO: 1 % (ref 0–1)
BILIRUB DIRECT SERPL-MCNC: 0.14 MG/DL (ref 0–0.2)
BILIRUB SERPL-MCNC: 0.69 MG/DL (ref 0.2–1)
BILIRUB UR QL STRIP: NEGATIVE
BUN SERPL-MCNC: 10 MG/DL (ref 5–25)
CALCIUM SERPL-MCNC: 9.2 MG/DL (ref 8.4–10.2)
CHLORIDE SERPL-SCNC: 100 MMOL/L (ref 96–108)
CLARITY UR: CLEAR
CO2 SERPL-SCNC: 29 MMOL/L (ref 21–32)
COLOR UR: YELLOW
CREAT SERPL-MCNC: 0.83 MG/DL (ref 0.6–1.3)
EOSINOPHIL # BLD AUTO: 0.03 THOUSAND/ÂΜL (ref 0–0.61)
EOSINOPHIL NFR BLD AUTO: 0 % (ref 0–6)
ERYTHROCYTE [DISTWIDTH] IN BLOOD BY AUTOMATED COUNT: 11 % (ref 11.6–15.1)
GFR SERPL CREATININE-BSD FRML MDRD: 121 ML/MIN/1.73SQ M
GLUCOSE SERPL-MCNC: 367 MG/DL (ref 65–140)
GLUCOSE SERPL-MCNC: 392 MG/DL (ref 65–140)
GLUCOSE SERPL-MCNC: 398 MG/DL (ref 65–140)
GLUCOSE UR STRIP-MCNC: ABNORMAL MG/DL
HCO3 BLDV-SCNC: 27.6 MMOL/L (ref 24–30)
HCT VFR BLD AUTO: 41.6 % (ref 36.5–49.3)
HGB BLD-MCNC: 14.4 G/DL (ref 12–17)
HGB UR QL STRIP.AUTO: NEGATIVE
IMM GRANULOCYTES # BLD AUTO: 0.02 THOUSAND/UL (ref 0–0.2)
IMM GRANULOCYTES NFR BLD AUTO: 0 % (ref 0–2)
KETONES UR STRIP-MCNC: NEGATIVE MG/DL
LEUKOCYTE ESTERASE UR QL STRIP: ABNORMAL
LIPASE SERPL-CCNC: 16 U/L (ref 11–82)
LYMPHOCYTES # BLD AUTO: 1.41 THOUSANDS/ÂΜL (ref 0.6–4.47)
LYMPHOCYTES NFR BLD AUTO: 19 % (ref 14–44)
MAGNESIUM SERPL-MCNC: 1.8 MG/DL (ref 1.9–2.7)
MCH RBC QN AUTO: 30.4 PG (ref 26.8–34.3)
MCHC RBC AUTO-ENTMCNC: 34.6 G/DL (ref 31.4–37.4)
MCV RBC AUTO: 88 FL (ref 82–98)
MONOCYTES # BLD AUTO: 0.76 THOUSAND/ÂΜL (ref 0.17–1.22)
MONOCYTES NFR BLD AUTO: 10 % (ref 4–12)
NEUTROPHILS # BLD AUTO: 5.06 THOUSANDS/ÂΜL (ref 1.85–7.62)
NEUTS SEG NFR BLD AUTO: 70 % (ref 43–75)
NITRITE UR QL STRIP: NEGATIVE
NON-SQ EPI CELLS URNS QL MICRO: NORMAL /HPF
NRBC BLD AUTO-RTO: 0 /100 WBCS
O2 CT BLDV-SCNC: 14.3 ML/DL
PCO2 BLDV: 51.3 MM HG (ref 42–50)
PH BLDV: 7.35 [PH] (ref 7.3–7.4)
PH UR STRIP.AUTO: 7 [PH] (ref 4.5–8)
PLATELET # BLD AUTO: 318 THOUSANDS/UL (ref 149–390)
PMV BLD AUTO: 9.7 FL (ref 8.9–12.7)
PO2 BLDV: 34.6 MM HG (ref 35–45)
POTASSIUM SERPL-SCNC: 4 MMOL/L (ref 3.5–5.3)
PROT SERPL-MCNC: 6.6 G/DL (ref 6.4–8.4)
PROT UR STRIP-MCNC: NEGATIVE MG/DL
RBC # BLD AUTO: 4.74 MILLION/UL (ref 3.88–5.62)
RBC #/AREA URNS AUTO: NORMAL /HPF
SODIUM SERPL-SCNC: 134 MMOL/L (ref 135–147)
SP GR UR STRIP.AUTO: 1.01 (ref 1–1.03)
TSH SERPL DL<=0.05 MIU/L-ACNC: 1.65 UIU/ML (ref 0.45–4.5)
UROBILINOGEN UR QL STRIP.AUTO: 0.2 E.U./DL
WBC # BLD AUTO: 7.32 THOUSAND/UL (ref 4.31–10.16)
WBC #/AREA URNS AUTO: NORMAL /HPF

## 2024-08-12 PROCEDURE — 96361 HYDRATE IV INFUSION ADD-ON: CPT

## 2024-08-12 PROCEDURE — 96375 TX/PRO/DX INJ NEW DRUG ADDON: CPT

## 2024-08-12 PROCEDURE — 83690 ASSAY OF LIPASE: CPT | Performed by: EMERGENCY MEDICINE

## 2024-08-12 PROCEDURE — 83735 ASSAY OF MAGNESIUM: CPT | Performed by: EMERGENCY MEDICINE

## 2024-08-12 PROCEDURE — 96372 THER/PROPH/DIAG INJ SC/IM: CPT

## 2024-08-12 PROCEDURE — 82010 KETONE BODYS QUAN: CPT | Performed by: EMERGENCY MEDICINE

## 2024-08-12 PROCEDURE — 82948 REAGENT STRIP/BLOOD GLUCOSE: CPT

## 2024-08-12 PROCEDURE — 99283 EMERGENCY DEPT VISIT LOW MDM: CPT

## 2024-08-12 PROCEDURE — 82805 BLOOD GASES W/O2 SATURATION: CPT | Performed by: EMERGENCY MEDICINE

## 2024-08-12 PROCEDURE — 85025 COMPLETE CBC W/AUTO DIFF WBC: CPT | Performed by: EMERGENCY MEDICINE

## 2024-08-12 PROCEDURE — 81001 URINALYSIS AUTO W/SCOPE: CPT

## 2024-08-12 PROCEDURE — 36415 COLL VENOUS BLD VENIPUNCTURE: CPT | Performed by: EMERGENCY MEDICINE

## 2024-08-12 PROCEDURE — 96365 THER/PROPH/DIAG IV INF INIT: CPT

## 2024-08-12 PROCEDURE — 80076 HEPATIC FUNCTION PANEL: CPT | Performed by: EMERGENCY MEDICINE

## 2024-08-12 PROCEDURE — 80048 BASIC METABOLIC PNL TOTAL CA: CPT | Performed by: EMERGENCY MEDICINE

## 2024-08-12 PROCEDURE — 99284 EMERGENCY DEPT VISIT MOD MDM: CPT | Performed by: EMERGENCY MEDICINE

## 2024-08-12 PROCEDURE — 84443 ASSAY THYROID STIM HORMONE: CPT | Performed by: EMERGENCY MEDICINE

## 2024-08-12 RX ORDER — KETOROLAC TROMETHAMINE 30 MG/ML
15 INJECTION, SOLUTION INTRAMUSCULAR; INTRAVENOUS ONCE
Status: COMPLETED | OUTPATIENT
Start: 2024-08-12 | End: 2024-08-12

## 2024-08-12 RX ORDER — ACETAMINOPHEN 10 MG/ML
1000 INJECTION, SOLUTION INTRAVENOUS ONCE
Status: COMPLETED | OUTPATIENT
Start: 2024-08-12 | End: 2024-08-12

## 2024-08-12 RX ORDER — INSULIN ASPART 100 [IU]/ML
30 INJECTION, SUSPENSION SUBCUTANEOUS
COMMUNITY

## 2024-08-12 RX ORDER — NAPROXEN 500 MG/1
500 TABLET ORAL 2 TIMES DAILY WITH MEALS
Qty: 20 TABLET | Refills: 0 | Status: SHIPPED | OUTPATIENT
Start: 2024-08-12 | End: 2024-08-22

## 2024-08-12 RX ADMIN — SODIUM CHLORIDE 1000 ML: 0.9 INJECTION, SOLUTION INTRAVENOUS at 18:49

## 2024-08-12 RX ADMIN — INSULIN HUMAN 5 UNITS: 100 INJECTION, SOLUTION PARENTERAL at 19:43

## 2024-08-12 RX ADMIN — ACETAMINOPHEN 1000 MG: 10 INJECTION INTRAVENOUS at 19:53

## 2024-08-12 RX ADMIN — KETOROLAC TROMETHAMINE 15 MG: 30 INJECTION, SOLUTION INTRAMUSCULAR; INTRAVENOUS at 18:48

## 2024-08-12 NOTE — ED NOTES
[facility] interpretation used for assessment.  Spent time trying to clarify insulin orders.  Pt states he is on 70/30 only and neither of the others listed in his med rec.  Medical record states that he was recently admitted for his DM, but pt unable to give clear details as to admission and discharge instruction.  States he ran out of whatever medication they gave him on discharge, which is why he presented to the ED today.       Mariano Spence RN  08/12/24 1911

## 2024-08-13 NOTE — DISCHARGE INSTRUCTIONS
The clinical care coordinator at [facility] has been trying to get in touch with you to set up appointments. The number is [phone], you should call in the morning to set this up so you can have your diabetes better management.    The number for urology is included in these discharge instructions, call to be seen in the office for further evaluation and management.     Take the Naprosyn twice daily for the next 5-10 days.    El coordinador de atención clínica de [facility] ha estado intentando ponerse en contacto con usted para programar citas. El número es [phone].     Debe llamar por la mañana para programar bill arias y así poder controlar mejor ch diabetes. El número de urología está incluido en estas instrucciones de jenae.     Llame para que lo atiendan en el consultorio para bill evaluación y un control más detallados. [place] Naprosyn dos veces al día pedrito los próximos 5 a 10 días.

## 2024-08-13 NOTE — ED PROVIDER NOTES
History  Chief Complaint   Patient presents with    Testicle Pain     Pt c/o bilateral testicle pain starting a month ago. Denies swelling and redness.    Back Pain     Pt c/o lower back pain that started around same time as testicle pain.     25 YO male presents with continuing testicular and back pain. States this has been mostly constant, he denies swelling or redness to the area. Patient had a recent admission to [facility] for DKA. Looking through records he did complain of this at that time as well, he was found then to have a balanitis with phimosis, patient states this issue has resolved and he has no concern regarding his penis. He denies changes in the discomfort, he did have an ultrasound for this which did not determine an etiology. Patient states he has been taking insulin prescribed from this hospital visit but he does not currently have follow up for continuing management of his sugars. Pt denies CP/SOB/F/C/N/V/D/C, no dysuria, burning on urination or blood in urine.       History provided by:  Patient   used: No        Prior to Admission Medications   Prescriptions Last Dose Informant Patient Reported? Taking?   Alcohol Swabs 70 % PADS   No No   Sig: May substitute brand based on insurance coverage. Check glucose TID.   Blood Glucose Monitoring Suppl (OneTouch Verio Reflect) w/Device KIT   No No   Sig: May substitute brand based on insurance coverage. Check glucose TID.   Insulin Glargine Solostar (Basaglar KwikPen) 100 UNIT/ML SOPN Not Taking  No No   Sig: Inject 0.2 mL (20 Units total) under the skin daily at bedtime   Patient not taking: Reported on 8/12/2024   Insulin Pen Needle (BD Pen Needle Shelly 2nd Gen) 32G X 4 MM MISC   No No   Sig: For use with insulin pen. Pharmacy may dispense brand covered by insurance.   Insulin Pen Needle (BD Pen Needle Shelly 2nd Gen) 32G X 4 MM MISC   No No   Sig: For use with insulin pen. Pharmacy may dispense brand covered by insurance.    OneTouch Delica Lancets 33G MISC   No No   Sig: May substitute brand based on insurance coverage. Check glucose TID.   glucose blood (OneTouch Verio) test strip   No No   Sig: May substitute brand based on insurance coverage. Check glucose TID.   insulin aspart (NovoLOG FlexPen) 100 UNIT/ML injection pen Not Taking  No No   Sig: Inject 8 Units under the skin 3 (three) times a day with meals   Patient not taking: Reported on 2024   insulin aspart protamine-insulin aspart (NovoLOG 70/30) 100 units/mL injection 2024 at 0845  Yes Yes   Sig: Inject 30 Units under the skin 2 (two) times a day before meals      Facility-Administered Medications: None       Past Medical History:   Diagnosis Date    Diabetes mellitus (HCC)     Glaucoma     Subaortic membrane        Past Surgical History:   Procedure Laterality Date    CARDIAC SURGERY         Family History   Problem Relation Age of Onset    Migraines Mother      I have reviewed and agree with the history as documented.    E-Cigarette/Vaping    E-Cigarette Use Never User      E-Cigarette/Vaping Substances    Nicotine No     THC No     CBD No     Flavoring No     Other No     Unknown No      Social History     Tobacco Use    Smoking status: Former     Current packs/day: 0.00     Types: Cigarettes     Quit date:      Years since quittin.6    Smokeless tobacco: Never    Tobacco comments:     4 years smoker 13 to 17 years old, 2020 quit   Vaping Use    Vaping status: Never Used   Substance Use Topics    Alcohol use: Yes     Comment: socially    Drug use: Not Currently       Review of Systems   Constitutional:  Negative for fever.   HENT:  Negative for dental problem.    Eyes:  Negative for visual disturbance.   Respiratory:  Negative for shortness of breath.    Cardiovascular:  Negative for chest pain.   Gastrointestinal:  Negative for abdominal pain, nausea and vomiting.   Genitourinary:  Positive for testicular pain. Negative for dysuria and frequency.    Musculoskeletal:  Positive for back pain. Negative for neck pain and neck stiffness.   Skin:  Negative for rash.   Neurological:  Negative for dizziness, weakness and light-headedness.   Psychiatric/Behavioral:  Negative for agitation, behavioral problems and confusion.    All other systems reviewed and are negative.      Physical Exam  Physical Exam  Vitals and nursing note reviewed.   Constitutional:       Appearance: He is well-developed.   HENT:      Head: Normocephalic and atraumatic.   Eyes:      Extraocular Movements: Extraocular movements intact.   Cardiovascular:      Rate and Rhythm: Normal rate.   Pulmonary:      Effort: Pulmonary effort is normal.   Abdominal:      General: There is no distension.   Genitourinary:     Penis: Normal.       Testes: Normal.   Musculoskeletal:         General: Normal range of motion.      Cervical back: Normal range of motion.   Skin:     Findings: No rash.   Neurological:      Mental Status: He is alert and oriented to person, place, and time.   Psychiatric:         Behavior: Behavior normal.         Vital Signs  ED Triage Vitals   Temperature Pulse Respirations Blood Pressure SpO2   08/12/24 1756 08/12/24 1753 08/12/24 1753 08/12/24 1753 08/12/24 1753   98.5 °F (36.9 °C) 100 18 117/74 100 %      Temp Source Heart Rate Source Patient Position - Orthostatic VS BP Location FiO2 (%)   08/12/24 1756 08/12/24 1753 08/12/24 1753 08/12/24 1753 --   Oral Monitor Sitting Right arm       Pain Score       08/12/24 1848       8           Vitals:    08/12/24 1753 08/12/24 1955   BP: 117/74 112/81   Pulse: 100 87   Patient Position - Orthostatic VS: Sitting Standing         Visual Acuity      ED Medications  Medications   sodium chloride 0.9 % bolus 1,000 mL (0 mL Intravenous Stopped 8/12/24 1950)   ketorolac (TORADOL) injection 15 mg (15 mg Intravenous Given 8/12/24 1848)   insulin regular (HumuLIN R,NovoLIN R) injection 5 Units (5 Units Subcutaneous Given 8/12/24 1943)    acetaminophen (Ofirmev) injection 1,000 mg (0 mg Intravenous Stopped 8/12/24 2013)       Diagnostic Studies  Results Reviewed       Procedure Component Value Units Date/Time    Fingerstick Glucose (POCT) [[id number]]  (Abnormal) Collected: 08/12/24 1942    Lab Status: Final result Specimen: Blood Updated: 08/12/24 1942     POC Glucose 367 mg/dl     TSH, 3rd generation with Free T4 reflex [[id number]]  (Normal) Collected: 08/12/24 1843    Lab Status: Final result Specimen: Blood from Arm, Left Updated: 08/12/24 1927     TSH 3RD GENERATON 1.646 uIU/mL     Urine Microscopic [[id number]]  (Normal) Collected: 08/12/24 1911    Lab Status: Final result Specimen: Urine, Clean Catch Updated: 08/12/24 1923     RBC, UA 1-2 /hpf      WBC, UA None Seen /hpf      Epithelial Cells Occasional /hpf      Bacteria, UA None Seen /hpf     Urine Macroscopic, POC [[id number]]  (Abnormal) Collected: 08/12/24 1911    Lab Status: Final result Specimen: Urine Updated: 08/12/24 1913     Color, UA Yellow     Clarity, UA Clear     pH, UA 7.0     Leukocytes, UA Elevated glucose may cause decreased leukocyte values. See urine microscopic for UWBC result     Nitrite, UA Negative     Protein, UA Negative mg/dl      Glucose, UA >=1000 (1%) mg/dl      Ketones, UA Negative mg/dl      Urobilinogen, UA 0.2 E.U./dl      Bilirubin, UA Negative     Occult Blood, UA Negative     Specific Gravity, UA 1.015    Narrative:      CLINITEK RESULT    Hepatic function panel [[id number]]  (Abnormal) Collected: 08/12/24 1843    Lab Status: Final result Specimen: Blood from Arm, Left Updated: 08/12/24 1912     Total Bilirubin 0.69 mg/dL      Bilirubin, Direct 0.14 mg/dL      Alkaline Phosphatase 77 U/L      AST 10 U/L      ALT 13 U/L      Total Protein 6.6 g/dL      Albumin 4.1 g/dL     Lipase [[id number]]  (Normal) Collected: 08/12/24 1843    Lab Status: Final result Specimen: Blood from Arm, Left Updated: 08/12/24 1912     Lipase 16 u/L     Beta Hydroxybutyrate  [[id number]]  (Normal) Collected: 08/12/24 1843    Lab Status: Final result Specimen: Blood from Arm, Left Updated: 08/12/24 1912     Beta- Hydroxybutyrate 0.09 mmol/L     Basic metabolic panel [[id number]]  (Abnormal) Collected: 08/12/24 1843    Lab Status: Final result Specimen: Blood from Arm, Left Updated: 08/12/24 1912     Sodium 134 mmol/L      Potassium 4.0 mmol/L      Chloride 100 mmol/L      CO2 29 mmol/L      ANION GAP 5 mmol/L      BUN 10 mg/dL      Creatinine 0.83 mg/dL      Glucose 392 mg/dL      Calcium 9.2 mg/dL      eGFR 121 ml/min/1.73sq m     Narrative:      National Kid[employer] Disease Foundation guidelines for Chronic Kidney Disease (CKD):     Stage 1 with normal or high GFR (GFR > 90 mL/min/1.73 square meters)    Stage 2 Mild CKD (GFR = 60-89 mL/min/1.73 square meters)    Stage 3A Moderate CKD (GFR = 45-59 mL/min/1.73 square meters)    Stage 3B Moderate CKD (GFR = 30-44 mL/min/1.73 square meters)    Stage 4 Severe CKD (GFR = 15-29 mL/min/1.73 square meters)    Stage 5 End Stage CKD (GFR <15 mL/min/1.73 square meters)  Note: GFR calculation is accurate only with a steady state creatinine    Magnesium [[id number]]  (Abnormal) Collected: 08/12/24 1843    Lab Status: Final result Specimen: Blood from Arm, Left Updated: 08/12/24 1912     Magnesium 1.8 mg/dL     Blood gas, venous [[id number]]  (Abnormal) Collected: 08/12/24 1843    Lab Status: Final result Specimen: Blood from Arm, Left Updated: 08/12/24 1900     pH, Oli 7.348     pCO2, Oli 51.3 mm Hg      pO2, Oli 34.6 mm Hg      HCO3, Oli 27.6 mmol/L      Base Excess, Oli 0.9 mmol/L      O2 Content, Oli 14.3 ml/dL      O2 HGB, VENOUS 65.5 %     CBC and differential [[id number]]  (Abnormal) Collected: 08/12/24 1843    Lab Status: Final result Specimen: Blood from Arm, Left Updated: 08/12/24 1855     WBC 7.32 Thousand/uL      RBC 4.74 Million/uL      Hemoglobin 14.4 g/dL      Hematocrit 41.6 %      MCV 88 fL      MCH 30.4 pg      MCHC 34.6 g/dL      RDW 11.0 %       MPV 9.7 fL      Platelets 318 Thousands/uL      nRBC 0 /100 WBCs      Segmented % 70 %      Immature Grans % 0 %      Lymphocytes % 19 %      Monocytes % 10 %      Eosinophils Relative 0 %      Basophils Relative 1 %      Absolute Neutrophils 5.06 Thousands/µL      Absolute Immature Grans 0.02 Thousand/uL      Absolute Lymphocytes 1.41 Thousands/µL      Absolute Monocytes 0.76 Thousand/µL      Eosinophils Absolute 0.03 Thousand/µL      Basophils Absolute 0.04 Thousands/µL     Fingerstick Glucose (POCT) [[id number]]  (Abnormal) Collected: 08/12/24 1825    Lab Status: Final result Specimen: Blood Updated: 08/12/24 1826     POC Glucose 398 mg/dl                    No orders to display              Procedures  Procedures         ED Course  ED Course as of 08/13/24 0006   Mon Aug 12, 2024   1930 ANION GAP: 5 2042 Reviewed testicular ultrasound from 7/29, essentially normal.   2044 Bacteria, UA: None Seen                                 SBIRT 22yo+      Flowsheet Row Most Recent Value   Initial Alcohol Screen: US AUDIT-C     1. How often do you have a drink containing alcohol? 1 Filed at: 08/12/2024 1754   2. How many drinks containing alcohol do you have on a typical day you are drinking?  0 Filed at: 08/12/2024 1754   3a. Male UNDER 65: How often do you have five or more drinks on one occasion? 0 Filed at: 08/12/2024 1754   Audit-C Score 1 Filed at: 08/12/2024 1754   PAMELA: How many times in the past year have you...    Used an illegal drug or used a prescription medication for non-medical reasons? Never Filed at: 08/12/2024 1754                      Medical Decision Making  1. Testicular pain - Patient with ongoing issues. States he does have urology appointment coming up, though it appears this was for his recent balanitis. He had a reassuring ultrasound recently. Will check urine for infection.    2. Hyperglycemia - Patient found to have an elevated blood sugar in the ED, this appears to be a chronic issue.  Patient does not curreny have outpatient resources though [facility] followed as an outpatient. Will check metabolic panel for electrolyte abnormalities and dehydration and an anion gap, VBG, beta-hydroxybutyrate. Will give IV fluids, likely give insulin.    Problems Addressed:  Back pain: acute illness or injury  Hyperglycemia: chronic illness or injury     Details: Recent A1C noted to be >15, patient appears to have very poor glycemic control.    Amount and/or Complexity of Data Reviewed  External Data Reviewed: labs and notes.  Labs: ordered. Decision-making details documented in ED Course.    Risk  OTC drugs.  Prescription drug management.                 Disposition  Final diagnoses:   Back pain   Pain in both testicles   Hyperglycemia     Time reflects when diagnosis was documented in both MDM as applicable and the Disposition within this note       Time User Action Codes Description Comment    8/12/2024  9:21 PM Adria Solorzano Add [M54.9] Back pain     8/12/2024  9:21 PM Adria Solorzano Add [N50.811,  N50.812] Pain in both testicles     8/12/2024  9:21 PM Adria Solorzano Add [R73.9] Hyperglycemia           ED Disposition       ED Disposition   Discharge    Condition   Stable    Date/Time   Mon Aug 12, 2024 2121    Comment   Sagar Acuna discharge to home/self care.                   Follow-up Information       Follow up With Specialties Details Why Contact Info Additional Information    [facility]   [address] [place] [id number]  [phone] [facility] [address], [place], [place], [address]   [phone]            Discharge Medication List as of 8/12/2024  9:30 PM        START taking these medications    Details   naproxen (NAPROSYN) 500 mg tablet Take 1 tablet (500 mg total) by mouth 2 (two) times a  day with meals for 10 days, Starting Mon 8/12/2024, Until Thu 8/22/2024, Normal           CONTINUE these medications which have NOT CHANGED    Details   insulin aspart protamine-insulin aspart (NovoLOG 70/30) 100 units/mL injection Inject 30 Units under the skin 2 (two) times a day before meals, Historical Med      Alcohol Swabs 70 % PADS May substitute brand based on insurance coverage. Check glucose TID., Normal      Blood Glucose Monitoring Suppl (OneTouch Verio Reflect) w/Device KIT May substitute brand based on insurance coverage. Check glucose TID., Normal      glucose blood (OneTouch Verio) test strip May substitute brand based on insurance coverage. Check glucose TID., Normal      insulin aspart (NovoLOG FlexPen) 100 UNIT/ML injection pen Inject 8 Units under the skin 3 (three) times a day with meals, Starting Sun 3/10/2024, Normal      Insulin Glargine Solostar (Basaglar KwikPen) 100 UNIT/ML SOPN Inject 0.2 mL (20 Units total) under the skin daily at bedtime, Starting Sun 3/10/2024, Normal      !! Insulin Pen Needle (BD Pen Needle Shelly 2nd Gen) 32G X 4 MM MISC For use with insulin pen. Pharmacy may dispense brand covered by insurance., Normal      !! Insulin Pen Needle (BD Pen Needle Shelly 2nd Gen) 32G X 4 MM MISC For use with insulin pen. Pharmacy may dispense brand covered by insurance., Normal      OneTouch Delica Lancets 33G MISC May substitute brand based on insurance coverage. Check glucose TID., Normal       !! - Potential duplicate medications found. Please discuss with provider.          No discharge procedures on file.    PDMP Review       None            ED Provider  Electronically Signed by             Adria Solorzano MD  08/13/24 0006

## 2024-09-16 ENCOUNTER — APPOINTMENT (EMERGENCY)
Dept: CT IMAGING | Facility: HOSPITAL | Age: 24
DRG: 420 | End: 2024-09-16
Payer: COMMERCIAL

## 2024-09-16 ENCOUNTER — APPOINTMENT (EMERGENCY)
Dept: RADIOLOGY | Facility: HOSPITAL | Age: 24
DRG: 420 | End: 2024-09-16
Payer: COMMERCIAL

## 2024-09-16 ENCOUNTER — HOSPITAL ENCOUNTER (INPATIENT)
Facility: HOSPITAL | Age: 24
LOS: 3 days | Discharge: HOME | DRG: 420 | End: 2024-09-19
Attending: EMERGENCY MEDICINE | Admitting: INTERNAL MEDICINE
Payer: COMMERCIAL

## 2024-09-16 DIAGNOSIS — E11.10 DIABETIC KETOACIDOSIS (HCC): Primary | ICD-10-CM

## 2024-09-16 DIAGNOSIS — E10.65 TYPE 1 DIABETES MELLITUS WITH HYPERGLYCEMIA (HCC): ICD-10-CM

## 2024-09-16 DIAGNOSIS — K20.90 ESOPHAGITIS: ICD-10-CM

## 2024-09-16 DIAGNOSIS — R11.2 NAUSEA AND VOMITING: ICD-10-CM

## 2024-09-16 DIAGNOSIS — E10.10 DIABETIC KETOACIDOSIS ASSOCIATED WITH TYPE 1 DIABETES MELLITUS (HCC): ICD-10-CM

## 2024-09-16 PROBLEM — R65.10 SIRS (SYSTEMIC INFLAMMATORY RESPONSE SYNDROME) (HCC): Status: ACTIVE | Noted: 2024-09-16

## 2024-09-16 LAB
ALBUMIN SERPL BCG-MCNC: 4.6 G/DL (ref 3.5–5)
ALP SERPL-CCNC: 73 U/L (ref 34–104)
ALT SERPL W P-5'-P-CCNC: 14 U/L (ref 7–52)
ANION GAP SERPL CALCULATED.3IONS-SCNC: 13 MMOL/L (ref 4–13)
ANION GAP SERPL CALCULATED.3IONS-SCNC: 21 MMOL/L (ref 4–13)
ANION GAP SERPL CALCULATED.3IONS-SCNC: 8 MMOL/L (ref 4–13)
AST SERPL W P-5'-P-CCNC: 15 U/L (ref 13–39)
ATRIAL RATE: 109 BPM
B-OH-BUTYR SERPL-MCNC: 9.13 MMOL/L (ref 0.02–0.27)
BACTERIA UR QL AUTO: ABNORMAL /HPF
BASE EX.OXY STD BLDV CALC-SCNC: 70 % (ref 60–80)
BASE EXCESS BLDV CALC-SCNC: -11.9 MMOL/L
BASOPHILS # BLD AUTO: 0.02 THOUSANDS/ΜL (ref 0–0.1)
BASOPHILS NFR BLD AUTO: 0 % (ref 0–1)
BILIRUB SERPL-MCNC: 0.89 MG/DL (ref 0.2–1)
BILIRUB UR QL STRIP: NEGATIVE
BUN SERPL-MCNC: 13 MG/DL (ref 5–25)
BUN SERPL-MCNC: 18 MG/DL (ref 5–25)
BUN SERPL-MCNC: 25 MG/DL (ref 5–25)
CALCIUM SERPL-MCNC: 8.7 MG/DL (ref 8.4–10.2)
CALCIUM SERPL-MCNC: 8.8 MG/DL (ref 8.4–10.2)
CALCIUM SERPL-MCNC: 9.8 MG/DL (ref 8.4–10.2)
CHLORIDE SERPL-SCNC: 106 MMOL/L (ref 96–108)
CHLORIDE SERPL-SCNC: 90 MMOL/L (ref 96–108)
CHLORIDE SERPL-SCNC: 98 MMOL/L (ref 96–108)
CLARITY UR: CLEAR
CO2 SERPL-SCNC: 16 MMOL/L (ref 21–32)
CO2 SERPL-SCNC: 22 MMOL/L (ref 21–32)
CO2 SERPL-SCNC: 23 MMOL/L (ref 21–32)
COLOR UR: ABNORMAL
CREAT SERPL-MCNC: 0.64 MG/DL (ref 0.6–1.3)
CREAT SERPL-MCNC: 0.72 MG/DL (ref 0.6–1.3)
CREAT SERPL-MCNC: 0.89 MG/DL (ref 0.6–1.3)
EOSINOPHIL # BLD AUTO: 0 THOUSAND/ΜL (ref 0–0.61)
EOSINOPHIL NFR BLD AUTO: 0 % (ref 0–6)
ERYTHROCYTE [DISTWIDTH] IN BLOOD BY AUTOMATED COUNT: 11.5 % (ref 11.6–15.1)
GFR SERPL CREATININE-BSD FRML MDRD: 118 ML/MIN/1.73SQ M
GFR SERPL CREATININE-BSD FRML MDRD: 128 ML/MIN/1.73SQ M
GFR SERPL CREATININE-BSD FRML MDRD: 135 ML/MIN/1.73SQ M
GLUCOSE SERPL-MCNC: 140 MG/DL (ref 65–140)
GLUCOSE SERPL-MCNC: 145 MG/DL (ref 65–140)
GLUCOSE SERPL-MCNC: 146 MG/DL (ref 65–140)
GLUCOSE SERPL-MCNC: 159 MG/DL (ref 65–140)
GLUCOSE SERPL-MCNC: 179 MG/DL (ref 65–140)
GLUCOSE SERPL-MCNC: 192 MG/DL (ref 65–140)
GLUCOSE SERPL-MCNC: 195 MG/DL (ref 65–140)
GLUCOSE SERPL-MCNC: 206 MG/DL (ref 65–140)
GLUCOSE SERPL-MCNC: 235 MG/DL (ref 65–140)
GLUCOSE SERPL-MCNC: 245 MG/DL (ref 65–140)
GLUCOSE SERPL-MCNC: 259 MG/DL (ref 65–140)
GLUCOSE SERPL-MCNC: 285 MG/DL (ref 65–140)
GLUCOSE SERPL-MCNC: 300 MG/DL (ref 65–140)
GLUCOSE SERPL-MCNC: 84 MG/DL (ref 65–140)
GLUCOSE SERPL-MCNC: 87 MG/DL (ref 65–140)
GLUCOSE SERPL-MCNC: 97 MG/DL (ref 65–140)
GLUCOSE UR STRIP-MCNC: ABNORMAL MG/DL
HCO3 BLDV-SCNC: 14.2 MMOL/L (ref 24–30)
HCT VFR BLD AUTO: 51.5 % (ref 36.5–49.3)
HGB BLD-MCNC: 18.5 G/DL (ref 12–17)
HGB UR QL STRIP.AUTO: NEGATIVE
HYALINE CASTS #/AREA URNS LPF: ABNORMAL /LPF
IMM GRANULOCYTES # BLD AUTO: 0.06 THOUSAND/UL (ref 0–0.2)
IMM GRANULOCYTES NFR BLD AUTO: 0 % (ref 0–2)
KETONES UR STRIP-MCNC: ABNORMAL MG/DL
LEUKOCYTE ESTERASE UR QL STRIP: ABNORMAL
LIPASE SERPL-CCNC: 10 U/L (ref 11–82)
LYMPHOCYTES # BLD AUTO: 1.8 THOUSANDS/ΜL (ref 0.6–4.47)
LYMPHOCYTES NFR BLD AUTO: 12 % (ref 14–44)
MAGNESIUM SERPL-MCNC: 2 MG/DL (ref 1.9–2.7)
MAGNESIUM SERPL-MCNC: 2 MG/DL (ref 1.9–2.7)
MAGNESIUM SERPL-MCNC: 2.3 MG/DL (ref 1.9–2.7)
MCH RBC QN AUTO: 29.8 PG (ref 26.8–34.3)
MCHC RBC AUTO-ENTMCNC: 35.9 G/DL (ref 31.4–37.4)
MCV RBC AUTO: 83 FL (ref 82–98)
MONOCYTES # BLD AUTO: 0.73 THOUSAND/ΜL (ref 0.17–1.22)
MONOCYTES NFR BLD AUTO: 5 % (ref 4–12)
NEUTROPHILS # BLD AUTO: 12.12 THOUSANDS/ΜL (ref 1.85–7.62)
NEUTS SEG NFR BLD AUTO: 83 % (ref 43–75)
NITRITE UR QL STRIP: NEGATIVE
NON-SQ EPI CELLS URNS QL MICRO: ABNORMAL /HPF
NRBC BLD AUTO-RTO: 0 /100 WBCS
O2 CT BLDV-SCNC: 19.3 ML/DL
P AXIS: 73 DEGREES
PCO2 BLDV: 34.3 MM HG (ref 42–50)
PH BLDV: 7.24 [PH] (ref 7.3–7.4)
PH UR STRIP.AUTO: 5.5 [PH]
PHOSPHATE SERPL-MCNC: 2.1 MG/DL (ref 2.7–4.5)
PHOSPHATE SERPL-MCNC: 3 MG/DL (ref 2.7–4.5)
PHOSPHATE SERPL-MCNC: 4.4 MG/DL (ref 2.7–4.5)
PLATELET # BLD AUTO: 370 THOUSANDS/UL (ref 149–390)
PMV BLD AUTO: 10.2 FL (ref 8.9–12.7)
PO2 BLDV: 40 MM HG (ref 35–45)
POTASSIUM SERPL-SCNC: 3.6 MMOL/L (ref 3.5–5.3)
POTASSIUM SERPL-SCNC: 4 MMOL/L (ref 3.5–5.3)
POTASSIUM SERPL-SCNC: 4.6 MMOL/L (ref 3.5–5.3)
PR INTERVAL: 158 MS
PROCALCITONIN SERPL-MCNC: <0.05 NG/ML
PROT SERPL-MCNC: 7.5 G/DL (ref 6.4–8.4)
PROT UR STRIP-MCNC: ABNORMAL MG/DL
QRS AXIS: 97 DEGREES
QRSD INTERVAL: 88 MS
QT INTERVAL: 338 MS
QTC INTERVAL: 455 MS
RBC # BLD AUTO: 6.2 MILLION/UL (ref 3.88–5.62)
RBC #/AREA URNS AUTO: ABNORMAL /HPF
SODIUM SERPL-SCNC: 127 MMOL/L (ref 135–147)
SODIUM SERPL-SCNC: 133 MMOL/L (ref 135–147)
SODIUM SERPL-SCNC: 137 MMOL/L (ref 135–147)
SP GR UR STRIP.AUTO: 1.03 (ref 1–1.03)
T WAVE AXIS: 25 DEGREES
UROBILINOGEN UR STRIP-ACNC: <2 MG/DL
VENTRICULAR RATE: 109 BPM
WBC # BLD AUTO: 14.73 THOUSAND/UL (ref 4.31–10.16)
WBC #/AREA URNS AUTO: ABNORMAL /HPF

## 2024-09-16 PROCEDURE — 80053 COMPREHEN METABOLIC PANEL: CPT

## 2024-09-16 PROCEDURE — 96367 TX/PROPH/DG ADDL SEQ IV INF: CPT

## 2024-09-16 PROCEDURE — 83690 ASSAY OF LIPASE: CPT

## 2024-09-16 PROCEDURE — 82948 REAGENT STRIP/BLOOD GLUCOSE: CPT

## 2024-09-16 PROCEDURE — 85025 COMPLETE CBC W/AUTO DIFF WBC: CPT

## 2024-09-16 PROCEDURE — 99291 CRITICAL CARE FIRST HOUR: CPT

## 2024-09-16 PROCEDURE — 71045 X-RAY EXAM CHEST 1 VIEW: CPT

## 2024-09-16 PROCEDURE — 83735 ASSAY OF MAGNESIUM: CPT

## 2024-09-16 PROCEDURE — 84100 ASSAY OF PHOSPHORUS: CPT

## 2024-09-16 PROCEDURE — 99223 1ST HOSP IP/OBS HIGH 75: CPT | Performed by: INTERNAL MEDICINE

## 2024-09-16 PROCEDURE — 36415 COLL VENOUS BLD VENIPUNCTURE: CPT

## 2024-09-16 PROCEDURE — 84145 PROCALCITONIN (PCT): CPT

## 2024-09-16 PROCEDURE — 96375 TX/PRO/DX INJ NEW DRUG ADDON: CPT

## 2024-09-16 PROCEDURE — 74177 CT ABD & PELVIS W/CONTRAST: CPT

## 2024-09-16 PROCEDURE — 80048 BASIC METABOLIC PNL TOTAL CA: CPT

## 2024-09-16 PROCEDURE — 82010 KETONE BODYS QUAN: CPT

## 2024-09-16 PROCEDURE — 96365 THER/PROPH/DIAG IV INF INIT: CPT

## 2024-09-16 PROCEDURE — 93005 ELECTROCARDIOGRAM TRACING: CPT

## 2024-09-16 PROCEDURE — 96366 THER/PROPH/DIAG IV INF ADDON: CPT

## 2024-09-16 PROCEDURE — 81001 URINALYSIS AUTO W/SCOPE: CPT

## 2024-09-16 PROCEDURE — 93010 ELECTROCARDIOGRAM REPORT: CPT | Performed by: INTERNAL MEDICINE

## 2024-09-16 PROCEDURE — 82805 BLOOD GASES W/O2 SATURATION: CPT

## 2024-09-16 RX ORDER — POTASSIUM CHLORIDE 14.9 MG/ML
20 INJECTION INTRAVENOUS ONCE
Status: COMPLETED | OUTPATIENT
Start: 2024-09-16 | End: 2024-09-16

## 2024-09-16 RX ORDER — DEXTROSE MONOHYDRATE AND SODIUM CHLORIDE 5; .9 G/100ML; G/100ML
250 INJECTION, SOLUTION INTRAVENOUS CONTINUOUS PRN
Status: DISCONTINUED | OUTPATIENT
Start: 2024-09-16 | End: 2024-09-16

## 2024-09-16 RX ORDER — ONDANSETRON 2 MG/ML
1 INJECTION INTRAMUSCULAR; INTRAVENOUS ONCE
Status: COMPLETED | OUTPATIENT
Start: 2024-09-16 | End: 2024-09-16

## 2024-09-16 RX ORDER — ONDANSETRON 2 MG/ML
4 INJECTION INTRAMUSCULAR; INTRAVENOUS EVERY 6 HOURS PRN
Status: DISCONTINUED | OUTPATIENT
Start: 2024-09-16 | End: 2024-09-19 | Stop reason: HOSPADM

## 2024-09-16 RX ORDER — INSULIN GLARGINE 100 [IU]/ML
15 INJECTION, SOLUTION SUBCUTANEOUS ONCE
Status: COMPLETED | OUTPATIENT
Start: 2024-09-16 | End: 2024-09-16

## 2024-09-16 RX ORDER — SODIUM CHLORIDE 9 MG/ML
3 INJECTION INTRAVENOUS
Status: DISCONTINUED | OUTPATIENT
Start: 2024-09-16 | End: 2024-09-17

## 2024-09-16 RX ORDER — ENOXAPARIN SODIUM 100 MG/ML
40 INJECTION SUBCUTANEOUS DAILY
Status: DISCONTINUED | OUTPATIENT
Start: 2024-09-16 | End: 2024-09-19 | Stop reason: HOSPADM

## 2024-09-16 RX ORDER — ACETAMINOPHEN 10 MG/ML
1000 INJECTION, SOLUTION INTRAVENOUS ONCE
Status: COMPLETED | OUTPATIENT
Start: 2024-09-16 | End: 2024-09-16

## 2024-09-16 RX ORDER — MAGNESIUM SULFATE HEPTAHYDRATE 40 MG/ML
2 INJECTION, SOLUTION INTRAVENOUS ONCE
Status: COMPLETED | OUTPATIENT
Start: 2024-09-16 | End: 2024-09-16

## 2024-09-16 RX ORDER — INSULIN LISPRO 100 [IU]/ML
1-5 INJECTION, SOLUTION INTRAVENOUS; SUBCUTANEOUS EVERY 6 HOURS SCHEDULED
Status: DISCONTINUED | OUTPATIENT
Start: 2024-09-17 | End: 2024-09-17

## 2024-09-16 RX ORDER — ONDANSETRON 2 MG/ML
4 INJECTION INTRAMUSCULAR; INTRAVENOUS ONCE
Status: COMPLETED | OUTPATIENT
Start: 2024-09-16 | End: 2024-09-16

## 2024-09-16 RX ORDER — SODIUM CHLORIDE, SODIUM GLUCONATE, SODIUM ACETATE, POTASSIUM CHLORIDE, MAGNESIUM CHLORIDE, SODIUM PHOSPHATE, DIBASIC, AND POTASSIUM PHOSPHATE .53; .5; .37; .037; .03; .012; .00082 G/100ML; G/100ML; G/100ML; G/100ML; G/100ML; G/100ML; G/100ML
2000 INJECTION, SOLUTION INTRAVENOUS ONCE
Status: COMPLETED | OUTPATIENT
Start: 2024-09-16 | End: 2024-09-16

## 2024-09-16 RX ORDER — SODIUM CHLORIDE 9 MG/ML
3 INJECTION INTRAVENOUS
Status: DISCONTINUED | OUTPATIENT
Start: 2024-09-16 | End: 2024-09-19 | Stop reason: HOSPADM

## 2024-09-16 RX ORDER — CHLORHEXIDINE GLUCONATE ORAL RINSE 1.2 MG/ML
15 SOLUTION DENTAL EVERY 12 HOURS SCHEDULED
Status: DISCONTINUED | OUTPATIENT
Start: 2024-09-16 | End: 2024-09-17

## 2024-09-16 RX ADMIN — DEXTROSE AND SODIUM CHLORIDE 250 ML/HR: 5; .9 INJECTION, SOLUTION INTRAVENOUS at 13:07

## 2024-09-16 RX ADMIN — CHLORHEXIDINE GLUCONATE 15 ML: 1.2 RINSE ORAL at 20:54

## 2024-09-16 RX ADMIN — ENOXAPARIN SODIUM 40 MG: 40 INJECTION SUBCUTANEOUS at 15:38

## 2024-09-16 RX ADMIN — DEXTROSE AND SODIUM CHLORIDE 250 ML/HR: 5; .9 INJECTION, SOLUTION INTRAVENOUS at 21:33

## 2024-09-16 RX ADMIN — MAGNESIUM SULFATE HEPTAHYDRATE 2 G: 40 INJECTION, SOLUTION INTRAVENOUS at 16:36

## 2024-09-16 RX ADMIN — DEXTROSE AND SODIUM CHLORIDE 250 ML/HR: 5; .9 INJECTION, SOLUTION INTRAVENOUS at 17:22

## 2024-09-16 RX ADMIN — ONDANSETRON 4 MG: 2 INJECTION INTRAMUSCULAR; INTRAVENOUS at 11:38

## 2024-09-16 RX ADMIN — SODIUM CHLORIDE, SODIUM GLUCONATE, SODIUM ACETATE, POTASSIUM CHLORIDE, MAGNESIUM CHLORIDE, SODIUM PHOSPHATE, DIBASIC, AND POTASSIUM PHOSPHATE 2000 ML: .53; .5; .37; .037; .03; .012; .00082 INJECTION, SOLUTION INTRAVENOUS at 11:37

## 2024-09-16 RX ADMIN — IOHEXOL 85 ML: 350 INJECTION, SOLUTION INTRAVENOUS at 12:48

## 2024-09-16 RX ADMIN — POTASSIUM CHLORIDE 20 MEQ: 14.9 INJECTION, SOLUTION INTRAVENOUS at 16:36

## 2024-09-16 RX ADMIN — SODIUM CHLORIDE 5.2 UNITS/HR: 9 INJECTION, SOLUTION INTRAVENOUS at 12:22

## 2024-09-16 RX ADMIN — INSULIN GLARGINE 15 UNITS: 100 INJECTION, SOLUTION SUBCUTANEOUS at 21:24

## 2024-09-16 RX ADMIN — ACETAMINOPHEN 1000 MG: 10 INJECTION INTRAVENOUS at 15:35

## 2024-09-16 RX ADMIN — ONDANSETRON 4 MG: 2 INJECTION INTRAMUSCULAR; INTRAVENOUS at 21:19

## 2024-09-16 NOTE — ASSESSMENT & PLAN NOTE
Lab Results   Component Value Date    HGBA1C >15.0 (H) 07/08/2024       Recent Labs     09/16/24  1304 09/16/24  1408 09/16/24  1506 09/16/24  1524   POCGLU 245* 235* 206* 195*       Blood Sugar Average: Last 72 hrs:  (P) 240    Patient presented on 9/16 with 2 day history of malaise, poor appetite, nausea, multiple episodes of vomiting, abdominal pain, and several episodes of diarrhea.  Administers insulin 70/30 30 units BID. No mealtime bolus or sliding scale regimen due to financial difficulties.  POC glucose 300. Anion gap 21. VBG - pH 7.23, HC03 14. Beta hydroxybutyrate 9. UA with 4+ ketones  S/p Isolyte 2L and normal saline 1L boluses by EMS and ED  CT A/P - No acute intra-abdominal abnormality. Thickening of the distal esophagus likely related to esophagitis given history of vomiting.   Etiology: poor insulin compliance    Plan  Continue DKA-protocol insulin gtt  Continue D5NS as POC glucose < 250  Electrolyte replacement per protocol  BMP, magnesium, phsophorus q4h  Diet NPO  Zofran prn for nausea

## 2024-09-16 NOTE — SEPSIS NOTE
Sepsis Note   Sagar Acuna 24 y.o. male MRN: [id number]  Unit/Bed#: ED-15 Encounter: [id number]       Initial Sepsis Screening       Row Name 09/16/24 1459 09/16/24 1458             Is the patient's history suggestive of a new or worsening infection? No  -SP No  -SP       Suspected source of infection -- --                 User Key  (r) = Recorded By, (t) = Taken By, (c) = Cosigned By      Initials Name Provider Type    SP Guerita Greene MD Resident                        Body mass index is 18.57 kg/m².  Wt Readings from Last 1 Encounters:   09/16/24 52.2 kg (115 lb 1.3 oz)     IBW (Ideal Body Weight): 63.8 kg    Ideal body weight: 63.8 kg (140 lb 10.5 oz)    Meets SIRS criteria with leukocytosis (likely hemoconcentration) and HR > 100. No suspected source of infection at this time. Monitor off antibiotics.

## 2024-09-16 NOTE — ASSESSMENT & PLAN NOTE
Met SIRS criteria with leukocytosis and HR > 90  Suspect WBC falsely elevated due to hemoconcentration  Suspect tachycardia secondary to volume depletion/dehydration  No suspected source of infection  Monitor off antibiotics

## 2024-09-16 NOTE — H&P
H&P - Critical Care/ICU   Name: Sagar Acuna 24 y.o. male I MRN: [id number]  Unit/Bed#: ICU 08 I Date of Admission: 9/16/2024   Date of Service: 9/16/2024 I Hospital Day: 0       Assessment & Plan  Diabetic ketoacidosis associated with type 1 diabetes mellitus (HCC)  Lab Results   Component Value Date    HGBA1C >15.0 (H) 07/08/2024       Recent Labs     09/16/24  1304 09/16/24  1408 09/16/24  1506 09/16/24  1524   POCGLU 245* 235* 206* 195*       Blood Sugar Average: Last 72 hrs:  (P) 240    Patient presented on 9/16 with 2 day history of malaise, poor appetite, nausea, multiple episodes of vomiting, abdominal pain, and several episodes of diarrhea.  Administers insulin 70/30 30 units BID. No mealtime bolus or sliding scale regimen due to financial difficulties.  POC glucose 300. Anion gap 21. VBG - pH 7.23, HC03 14. Beta hydroxybutyrate 9. UA with 4+ ketones  S/p [place] 2L and normal saline 1L boluses by EMS and ED  CT A/P - No acute intra-abdominal abnormality. Thickening of the distal esophagus likely related to esophagitis given history of vomiting.   Etiology: poor insulin compliance    Plan  Continue DKA-protocol insulin gtt  Continue D5NS as POC glucose < 250  Electrolyte replacement per protocol  BMP, magnesium, phsophorus q4h  Diet NPO  Zofran prn for nausea  Hyponatremia  Recent Labs     09/16/24  1135   SODIUM 127*     Likely component of pseudohyponatremia (2/2 hyperglycemia) and poor PO intake since onset of symptoms  Anticipate improvement with IV fluids  SIRS (systemic inflammatory response syndrome) (HCC)  Met SIRS criteria with leukocytosis and HR > 90  Suspect WBC falsely elevated due to hemoconcentration  Suspect tachycardia secondary to volume depletion/dehydration  No suspected source of infection  Monitor off antibiotics  Disposition: Stepdown Level 1    History of Present Illness   Sagar Acuna is a 24 y.o. who presents with a 2 day history of nausea, vomiting, abdominal  pain, and diarrhea. He also reports malaise and poor appetite due to these symptoms. Last bowel movement occurred last night. Denies fever, chills, chest pain, shortness of breath, cough, blood in stool, dyuria/hematuria. Patient reports that he takes 30 units of insulin 70/30 twice daily, but no mealtime insulin. This is all he can currently afford. He was admitted to [facility] for DKA twice in July 2024. During the 7/8/2024 admission, patient reported noncompliance with insulin for at least 6 months, which he has a history of. Per 7/28/2024 hospitalization encounter, patient was discharged with recommendation to insulin 70/30 30 units twice daily. Unclear if any sliding scale regimen was recommended. Patient denies alcohol, tobacco, or illicit drug use.    Notably, patient was recently seen by multiple providers for evaluation of testicular pain. He was diagnosed with orchitis, and prescribed a 14 day course of doxycyline. Patient cannot recall if he filled or took any of this medication. He denies any testicular or penile pain at this time.    Servio  [id number] assisted with interpretation    History obtained from chart review and the patient.  Review of Systems: Review of Systems   Constitutional:  Positive for appetite change and fatigue. Negative for chills and fever.   HENT:  Negative for ear pain and sore throat.    Eyes:  Negative for pain and visual disturbance.   Respiratory:  Negative for cough and shortness of breath.    Cardiovascular:  Negative for chest pain and palpitations.   Gastrointestinal:  Positive for abdominal pain, diarrhea, nausea and vomiting.   Genitourinary:  Negative for dysuria and hematuria.   Musculoskeletal:  Negative for arthralgias and back pain.   Skin:  Negative for color change and rash.   Neurological:  Negative for seizures and syncope.   All other systems reviewed and are negative.      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 Check glucose TID.    Blood Glucose Monitoring Suppl (OneTouch Verio Reflect) w/Device KIT May substitute brand based on insurance coverage. Check glucose TID.    glucose blood (OneTouch Verio) test strip May substitute brand based on insurance coverage. Check glucose TID.    insulin aspart (NOVOLOG FLEXPEN) 8 Units, Subcutaneous, 3 times daily with meals    insulin aspart protamine-insulin aspart (NOVOLOG 70/30) 30 Units, Subcutaneous, 2 times daily before meals    Insulin Glargine Solostar (BASAGLAR KWIKPEN) 20 Units, Subcutaneous, Daily at bedtime    Insulin Pen Needle (BD Pen Needle Shelly 2nd Gen) 32G X 4 MM MISC For use with insulin pen. Pharmacy may dispense brand covered by insurance.    Insulin Pen Needle (BD Pen Needle Shelly 2nd Gen) 32G X 4 MM MISC For use with insulin pen. Pharmacy may dispense brand covered by insurance.    naproxen (NAPROSYN) 500 mg, Oral, 2 times daily with meals    OneTouch Delica Lancets 33G MISC May substitute brand based on insurance coverage. Check glucose TID.    No Known Allergies   Social History     Tobacco Use    Smoking status: Former     Current packs/day: 0.00     Types: Cigarettes     Quit date:      Years since quittin.7    Smokeless tobacco: Never    Tobacco comments:     4 years smoker 13 to 17 years old, 2020 quit   Vaping Use    Vaping status: Never Used   Substance Use Topics    Alcohol use: Yes     Comment: socially    Drug use: Not Currently    Family History   Problem Relation Age of Onset    Migraines Mother           Objective                          Vitals I/O      Most Recent Min/Max in 24hrs   Temp 98.1 °F (36.7 °C) Temp  Min: 97.7 °F (36.5 °C)  Max: 98.1 °F (36.7 °C)   Pulse (!) 111 Pulse  Min: 99  Max: 122   Resp  15 Resp  Min: 15  Max: 20   /75 BP  Min: 108/63  Max: 119/69   O2 Sat 100 % SpO2  Min: 98 %  Max: 100 %      Intake/Output Summary (Last 24 hours) at 9/16/2024 1606  Last data filed at 9/16/2024 1221  Gross per 24 hour   Intake 2000 ml   Output --   Net 2000 ml       Diet NPO; Sips of clear liquids    Invasive Monitoring           Physical Exam   Physical Exam  Eyes:      Extraocular Movements: Extraocular movements intact.      Pupils: Pupils are equal, round, and reactive to light.   Skin:     General: Skin is warm.   HENT:      Head: Normocephalic and atraumatic.      Mouth/Throat:      Mouth: Mucous membranes are dry.   Cardiovascular:      Rate and Rhythm: Regular rhythm. Tachycardia present.      Heart sounds: Normal heart sounds.   Musculoskeletal:         General: No swelling. Normal range of motion.      Right lower leg: No edema.      Left lower leg: No edema.   Abdominal:      Palpations: Abdomen is soft.      Tenderness: There is abdominal tenderness (upper quadrants).   Constitutional:       Appearance: He is ill-appearing.   Pulmonary:      Effort: Pulmonary effort is normal.      Breath sounds: Normal breath sounds.   Neurological:      Mental Status: He is alert and oriented to person, place and time.          Diagnostic Studies        Lab Results: I have reviewed the following results:      Medications:  Scheduled PRN   chlorhexidine, 15 mL, Q12H CRYSTAL  enoxaparin, 40 mg, Daily      dextrose 5 % and sodium chloride 0.9 %, 250 mL/hr, Continuous PRN  ondansetron, 4 mg, Q6H PRN  sodium chloride (PF), 3 mL, Q1H PRN  sodium chloride (PF), 3 mL, Q1H PRN       Continuous    dextrose 5 % and sodium chloride 0.9 %, 250 mL/hr, Last Rate: 250 mL/hr (09/16/24 1307)  insulin regular (HumuLIN R,NovoLIN R) 1 Units/mL in sodium chloride 0.9 % 100 mL infusion, 0.1-30 Units/hr, Last Rate: 5.2 Units/hr (09/16/24 1222)         Labs:   CBC    Recent Labs     09/16/24  1135   WBC 14.73*   HGB 18.5*   HCT 51.5*   PLT  370     BMP    Recent Labs     09/16/24  1135   SODIUM 127*   K 4.6   CL 90*   CO2 16*   AGAP 21*   BUN 25   CREATININE 0.89   CALCIUM 9.8       Coags    No recent results     Additional Electrolytes  Recent Labs     09/16/24  1135   MG 2.0   PHOS 4.4          Blood Gas    No recent results  Recent Labs     09/16/24  1135   PHVEN 7.236*   HVF9ZZS 34.3*   PO2VEN 40.0   PES7STZ 14.2*   BEVEN -11.9   L2NGGSA 70.0    LFTs  Recent Labs     09/16/24  1135   ALT 14   AST 15   ALKPHOS 73   ALB 4.6   TBILI 0.89       Infectious  No recent results  Glucose  Recent Labs     09/16/24  1135   GLUC 285*

## 2024-09-16 NOTE — ASSESSMENT & PLAN NOTE
Recent Labs     09/16/24  1135   SODIUM 127*     Likely component of pseudohyponatremia (2/2 hyperglycemia) and poor PO intake since onset of symptoms  Anticipate improvement with IV fluids

## 2024-09-16 NOTE — PLAN OF CARE
Problem: PAIN - ADULT  Goal: Verbalizes/displays adequate comfort level or baseline comfort level  Description: Interventions:  - Encourage patient to monitor pain and request assistance  - Assess pain using appropriate pain scale  - Administer analgesics based on type and severity of pain and evaluate response  - Implement non-pharmacological measures as appropriate and evaluate response  - Consider cultural and social influences on pain and pain management  - Notify physician/advanced practitioner if interventions unsuccessful or patient reports new pain  Outcome: Progressing     Problem: INFECTION - ADULT  Goal: Absence or prevention of progression during hospitalization  Description: INTERVENTIONS:  - Assess and monitor for signs and symptoms of infection  - Monitor lab/diagnostic results  - Monitor all insertion sites, i.e. indwelling lines, tubes, and drains  - Monitor endotracheal if appropriate and nasal secretions for changes in amount and color  - [place] appropriate cooling/warming therapies per order  - Administer medications as ordered  - Instruct and encourage patient and family to use good hand hygiene technique  - Identify and instruct in appropriate isolation precautions for identified infection/condition  Outcome: Progressing  Goal: Absence of fever/infection during neutropenic period  Description: INTERVENTIONS:  - Monitor WBC    Outcome: Progressing     Problem: SAFETY ADULT  Goal: Patient will remain free of falls  Description: INTERVENTIONS:  - Educate patient/family on patient safety including physical limitations  - Instruct patient to call for assistance with activity   - Consult OT/PT to assist with strengthening/mobility   - Keep Call bell within reach  - Keep bed low and locked with side rails adjusted as appropriate  - Keep care items and personal belongings within reach  - Initiate and maintain comfort rounds  - Make Fall Risk Sign visible to staff  - Offer Toileting every 2 Hours,  in advance of need  - Initiate/Maintain bed alarm  - Obtain necessary fall risk management equipment  - Apply yellow socks and bracelet for high fall risk patients  - Consider moving patient to room near nurses station  Outcome: Progressing  Goal: Maintain or return to baseline ADL function  Description: INTERVENTIONS:  -  Assess patient's ability to carry out ADLs; assess patient's baseline for ADL function and identify physical deficits which impact ability to perform ADLs (bathing, care of mouth/teeth, toileting, grooming, dressing, etc.)  - Assess/evaluate cause of self-care deficits   - Assess range of motion  - Assess patient's mobility; develop plan if impaired  - Assess patient's need for assistive devices and provide as appropriate  - Encourage maximum independence but intervene and supervise when necessary  - Involve family in performance of ADLs  - Assess for home care needs following discharge   - Consider OT consult to assist with ADL evaluation and planning for discharge  - Provide patient education as appropriate  Outcome: Progressing  Goal: Maintains/Returns to pre admission functional level  Description: INTERVENTIONS:  - Perform AM-PAC 6 Click Basic Mobility/ Daily Activity assessment daily.  - Set and communicate daily mobility goal to care team and patient/family/caregiver.   - Collaborate with rehabilitation services on mobility goals if consulted  - Perform Range of Motion 3 times a day.  - Reposition patient every 2 hours.  - Dangle patient 3 times a day  - Stand patient 3 times a day  - Ambulate patient 3 times a day  - Out of bed to chair 3 times a day   - Out of bed for meals 3 times a day  - Out of bed for toileting  - Record patient progress and toleration of activity level   Outcome: Progressing     Problem: DISCHARGE PLANNING  Goal: Discharge to home or other facility with appropriate resources  Description: INTERVENTIONS:  - Identify barriers to discharge w/patient and caregiver  -  Arrange for needed discharge resources and transportation as appropriate  - Identify discharge learning needs (meds, wound care, etc.)  - Arrange for interpretive services to assist at discharge as needed  - Refer to Case Management Department for coordinating discharge planning if the patient needs post-hospital services based on physician/advanced practitioner order or complex needs related to functional status, cognitive ability, or social support system  Outcome: Progressing     Problem: Knowledge Deficit  Goal: Patient/family/caregiver demonstrates understanding of disease process, treatment plan, medications, and discharge instructions  Description: Complete learning assessment and assess knowledge base.  Interventions:  - Provide teaching at level of understanding  - Provide teaching via preferred learning methods  Outcome: Progressing     Problem: METABOLIC, FLUID AND ELECTROLYTES - ADULT  Goal: Electrolytes maintained within normal limits  Description: INTERVENTIONS:  - Monitor labs and assess patient for signs and symptoms of electrolyte imbalances  - Administer electrolyte replacement as ordered  - Monitor response to electrolyte replacements, including repeat lab results as appropriate  - Instruct patient on fluid and nutrition as appropriate  Outcome: Progressing  Goal: Fluid balance maintained  Description: INTERVENTIONS:  - Monitor labs   - Monitor I/O and WT  - Instruct patient on fluid and nutrition as appropriate  - Assess for signs & symptoms of volume excess or deficit  Outcome: Progressing  Goal: Glucose maintained within target range  Description: INTERVENTIONS:  - Monitor Blood Glucose as ordered  - Assess for signs and symptoms of hyperglycemia and hypoglycemia  - Administer ordered medications to maintain glucose within target range  - Assess nutritional intake and initiate nutrition service referral as needed  Outcome: Progressing

## 2024-09-16 NOTE — ED ATTENDING ATTESTATION
9/16/2024  I, Johnny Looney MD, saw and evaluated the patient. I have discussed the patient with the resident/non-physician practitioner and agree with the resident's/non-physician practitioner's findings, Plan of Care, and MDM as documented in the resident's/non-physician practitioner's note, except where noted. All available labs and Radiology studies were reviewed.  I was present for key portions of any procedure(s) performed by the resident/non-physician practitioner and I was immediately available to provide assistance.       At this point I agree with the current assessment done in the Emergency Department.  I have conducted an independent evaluation of this patient a history and physical is as follows:  History of diabetes with diffuse abdominal pain nausea vomiting for the last couple of days.  Patient is on insulin.  Has had prior DKA in the past.  Review of labs show he is in DKA.  Fluids Zofran and insulin drip and admit to critical care.  He is afebrile here no signs of infection at the moment.  ED Course         Critical Care Time  Procedures

## 2024-09-16 NOTE — PLAN OF CARE
Problem: PAIN - ADULT  Goal: Verbalizes/displays adequate comfort level or baseline comfort level  Description: Interventions:  - Encourage patient to monitor pain and request assistance  - Assess pain using appropriate pain scale  - Administer analgesics based on type and severity of pain and evaluate response  - Implement non-pharmacological measures as appropriate and evaluate response  - Consider cultural and social influences on pain and pain management  - Notify physician/advanced practitioner if interventions unsuccessful or patient reports new pain  Outcome: Progressing     Problem: INFECTION - ADULT  Goal: Absence or prevention of progression during hospitalization  Description: INTERVENTIONS:  - Assess and monitor for signs and symptoms of infection  - Monitor lab/diagnostic results  - Monitor all insertion sites, i.e. indwelling lines, tubes, and drains  - Monitor endotracheal if appropriate and nasal secretions for changes in amount and color  - [place] appropriate cooling/warming therapies per order  - Administer medications as ordered  - Instruct and encourage patient and family to use good hand hygiene technique  - Identify and instruct in appropriate isolation precautions for identified infection/condition  Outcome: Progressing  Goal: Absence of fever/infection during neutropenic period  Description: INTERVENTIONS:  - Monitor WBC    Outcome: Progressing     Problem: SAFETY ADULT  Goal: Patient will remain free of falls  Description: INTERVENTIONS:  - Educate patient/family on patient safety including physical limitations  - Instruct patient to call for assistance with activity   - Consult OT/PT to assist with strengthening/mobility   - Keep Call bell within reach  - Keep bed low and locked with side rails adjusted as appropriate  - Keep care items and personal belongings within reach  - Initiate and maintain comfort rounds  - Make Fall Risk Sign visible to staff  - Offer Toileting in advance of  need  - Initiate/Maintain bed alarm  - Obtain necessary fall risk management equipment  - Apply yellow socks and bracelet for high fall risk patients  - Consider moving patient to room near nurses station  Outcome: Progressing  Goal: Maintain or return to baseline ADL function  Description: INTERVENTIONS:  -  Assess patient's ability to carry out ADLs; assess patient's baseline for ADL function and identify physical deficits which impact ability to perform ADLs (bathing, care of mouth/teeth, toileting, grooming, dressing, etc.)  - Assess/evaluate cause of self-care deficits   - Assess range of motion  - Assess patient's mobility; develop plan if impaired  - Assess patient's need for assistive devices and provide as appropriate  - Encourage maximum independence but intervene and supervise when necessary  - Involve family in performance of ADLs  - Assess for home care needs following discharge   - Consider OT consult to assist with ADL evaluation and planning for discharge  - Provide patient education as appropriate  Outcome: Progressing     Problem: METABOLIC, FLUID AND ELECTROLYTES - ADULT  Goal: Electrolytes maintained within normal limits  Description: INTERVENTIONS:  - Monitor labs and assess patient for signs and symptoms of electrolyte imbalances  - Administer electrolyte replacement as ordered  - Monitor response to electrolyte replacements, including repeat lab results as appropriate  - Instruct patient on fluid and nutrition as appropriate  Outcome: Progressing  Goal: Fluid balance maintained  Description: INTERVENTIONS:  - Monitor labs   - Monitor I/O and WT  - Instruct patient on fluid and nutrition as appropriate  - Assess for signs & symptoms of volume excess or deficit  Outcome: Progressing  Goal: Glucose maintained within target range  Description: INTERVENTIONS:  - Monitor Blood Glucose as ordered  - Assess for signs and symptoms of hyperglycemia and hypoglycemia  - Administer ordered medications to  maintain glucose within target range  - Assess nutritional intake and initiate nutrition service referral as needed  Outcome: Progressing      ID band checked with parent./done

## 2024-09-16 NOTE — ED PROVIDER NOTES
1. Diabetic ketoacidosis (HCC)    2. Nausea and vomiting    3. Esophagitis      ED Disposition       ED Disposition   Admit    Condition   Stable    Date/Time   Mon Sep 16, 2024  2:46 PM    Comment   Case was discussed with Erasmo Lozano and the patient's admission status was agreed to be Admission Status: inpatient status to the service of Dr. Jaffe.               Assessment & Plan       Medical Decision Making  24-year-old male presents the ED for evaluation of 2 days of nausea and vomiting, also complains of abdominal pain.  History of DM1 on insulin, does have a history of DKA.  Patient afebrile in ED.  Patient given 1 L normal saline and 4 mg Zofran by EMS prior to arrival, given 2 L isolyte in ED, 4 mg Zofran.  VBG showing pH of 7.23, beta hydroxybutyrate elevated at 9.1.  Discussed case with critical care who was agreeable to admission to stepdown 1.    Amount and/or Complexity of Data Reviewed  Labs: ordered.  Radiology: ordered and independent interpretation performed.    Risk  Prescription drug management.  Decision regarding hospitalization.      Chief Complaint   Patient presents with    Vomiting     Pt arrives via ems for nausea and vomiting for 2 days with poor appetite, weakness, and dizziness.Pt c/o generalized abdominal pain  Pt did take his insulin last night and his glucose at home was 240.  Pt given 4 mg zofran pta      Past Medical History:   Diagnosis Date    Diabetes mellitus (HCC)     Glaucoma     Subaortic membrane      Past Surgical History:   Procedure Laterality Date    CARDIAC SURGERY       .            ED Course as of 09/16/24 1525   Mon Sep 16, 2024   1339 IMPRESSION:     No acute intra-abdominal abnormality. Thickening of the distal esophagus likely related to esophagitis given history of vomiting.        1349 Critical care paged.       Medications   sodium chloride (PF) 0.9 % injection 3 mL (has no administration in time range)   sodium chloride (PF) 0.9 % injection 3 mL (has  no administration in time range)   insulin regular (HumuLIN R,NovoLIN R) 1 Units/mL in sodium chloride 0.9 % 100 mL infusion (5.2 Units/hr Intravenous New Bag 9/16/24 1222)   dextrose 5 % and sodium chloride 0.9 % infusion (250 mL/hr Intravenous New Bag 9/16/24 1307)   chlorhexidine (PERIDEX) 0.12 % oral rinse 15 mL (has no administration in time range)   enoxaparin (LOVENOX) subcutaneous injection 40 mg (has no administration in time range)   ondansetron (FOR EMS ONLY) (ZOFRAN) 4 mg/2 mL injection 4 mg (0 mg Does not apply Given to EMS 9/16/24 1034)   multi-electrolyte (ISOLYTE-S PH 7.4) bolus 2,000 mL (0 mL Intravenous Stopped 9/16/24 1221)   ondansetron (ZOFRAN) injection 4 mg (4 mg Intravenous Given 9/16/24 1138)   iohexol (OMNIPAQUE) 350 MG/ML injection (SINGLE-DOSE) 85 mL (85 mL Intravenous Given 9/16/24 1248)       History of Present Illness       This is a 24-year-old male with medical history significant for DM1 on insulin, history of DKA who presents to the ED via EMS for evaluation of approximately 2 days of nausea and vomiting.  Patient reports innumerable episodes of vomiting over the last 2 days.  He does also complain of diffuse abdominal pain that he describes as an aching sensation.  Also complains of lightheadedness, denies any episodes of syncope.  He denies any recent travel.  He denies any recent fevers, chills, vision changes, neck pain, chest pain, SOB, cough, flank pain, dysuria.  Patient reports he last took insulin last night.       used: Yes (Staff)    Vomiting  Associated symptoms: abdominal pain and diarrhea    Associated symptoms: no chills, no cough and no fever            Review of Systems   Constitutional:  Negative for chills and fever.   HENT: Negative.     Eyes:  Negative for photophobia and visual disturbance.   Respiratory:  Negative for cough and shortness of breath.    Cardiovascular:  Negative for chest pain.   Gastrointestinal:  Positive for abdominal  pain, diarrhea, nausea and vomiting. Negative for blood in stool.   Genitourinary:  Negative for difficulty urinating, dysuria, flank pain and hematuria.   Musculoskeletal:  Negative for neck pain and neck stiffness.   Neurological:  Positive for light-headedness. Negative for dizziness, syncope and weakness.           Objective     ED Triage Vitals   Temperature Pulse Blood Pressure Respirations SpO2 Patient Position - Orthostatic VS   09/16/24 1036 09/16/24 1036 09/16/24 1036 09/16/24 1036 09/16/24 1036 09/16/24 1036   97.7 °F (36.5 °C) (!) 110 112/75 18 100 % Lying      Temp src Heart Rate Source BP Location FiO2 (%) Pain Score    -- 09/16/24 1509 09/16/24 1036 -- 09/16/24 1141     Monitor Left arm  8        Physical Exam  Vitals and nursing note reviewed.   Constitutional:       General: He is not in acute distress.     Appearance: He is well-developed. He is ill-appearing.   HENT:      Head: Normocephalic and atraumatic.   Eyes:      Conjunctiva/sclera: Conjunctivae normal.   Cardiovascular:      Rate and Rhythm: Normal rate and regular rhythm.      Heart sounds: No murmur heard.  Pulmonary:      Effort: Pulmonary effort is normal. No respiratory distress.      Breath sounds: Normal breath sounds.   Abdominal:      Palpations: Abdomen is soft.      Tenderness: There is generalized abdominal tenderness. There is left CVA tenderness. There is no right CVA tenderness or guarding. Negative signs include Noland's sign.   Musculoskeletal:         General: No swelling.      Cervical back: Normal range of motion and neck supple. No rigidity.   Skin:     General: Skin is warm and dry.      Capillary Refill: Capillary refill takes less than 2 seconds.   Neurological:      General: No focal deficit present.      Mental Status: He is alert. He is disoriented.   Psychiatric:         Mood and Affect: Mood normal.         Labs Reviewed   COMPREHENSIVE METABOLIC PANEL - Abnormal       Result Value    Sodium 127 (*)      Potassium 4.6      Chloride 90 (*)     CO2 16 (*)     ANION GAP 21 (*)     BUN 25      Creatinine 0.89      Glucose 285 (*)     Calcium 9.8      AST 15      ALT 14      Alkaline Phosphatase 73      Total Protein 7.5      Albumin 4.6      Total Bilirubin 0.89      eGFR 118      Narrative:     National Kid[employer] Disease Foundation guidelines for Chronic Kidney Disease (CKD):     Stage 1 with normal or high GFR (GFR > 90 mL/min/1.73 square meters)    Stage 2 Mild CKD (GFR = 60-89 mL/min/1.73 square meters)    Stage 3A Moderate CKD (GFR = 45-59 mL/min/1.73 square meters)    Stage 3B Moderate CKD (GFR = 30-44 mL/min/1.73 square meters)    Stage 4 Severe CKD (GFR = 15-29 mL/min/1.73 square meters)    Stage 5 End Stage CKD (GFR <15 mL/min/1.73 square meters)  Note: GFR calculation is accurate only with a steady state creatinine   BETA HYDROXYBUTYRATE - Abnormal    Beta- Hydroxybutyrate 9.13 (*)    BLOOD GAS, VENOUS - Abnormal    pH, Oli 7.236 (*)     pCO2, Oli 34.3 (*)     pO2, Oli 40.0      HCO3, Oli 14.2 (*)     Base Excess, Oli -11.9      O2 Content, Oli 19.3      O2 HGB, VENOUS 70.0     LIPASE - Abnormal    Lipase 10 (*)    CBC AND DIFFERENTIAL - Abnormal    WBC 14.73 (*)     RBC 6.20 (*)     Hemoglobin 18.5 (*)     Hematocrit 51.5 (*)     MCV 83      MCH 29.8      MCHC 35.9      RDW 11.5 (*)     MPV 10.2      Platelets 370      nRBC 0      Segmented % 83 (*)     Immature Grans % 0      Lymphocytes % 12 (*)     Monocytes % 5      Eosinophils Relative 0      Basophils Relative 0      Absolute Neutrophils 12.12 (*)     Absolute Immature Grans 0.06      Absolute Lymphocytes 1.80      Absolute Monocytes 0.73      Eosinophils Absolute 0.00      Basophils Absolute 0.02     UA W REFLEX TO MICROSCOPIC WITH REFLEX TO CULTURE - Abnormal    Color, UA Light Yellow      Clarity, UA Clear      Specific Gravity, UA 1.028      pH, UA 5.5      Leukocytes, UA   (*)     Value: Elevated glucose may cause decreased leukocyte values. See urine  microscopic for UWBC result    Nitrite, UA Negative      Protein, UA Trace (*)     Glucose, UA >=1000 (1%) (*)     Ketones, UA >=150 (4+) (*)     Urobilinogen, UA <2.0      Bilirubin, UA Negative      Occult Blood, UA Negative     URINE MICROSCOPIC - Abnormal    RBC, UA None Seen      WBC, UA None Seen      Epithelial Cells Occasional      Bacteria, UA None Seen      Hyaline Casts, UA 0-3 (*)    POCT GLUCOSE - Abnormal    POC Glucose 300 (*)    POCT GLUCOSE - Abnormal    POC Glucose 259 (*)    POCT GLUCOSE - Abnormal    POC Glucose 245 (*)    POCT GLUCOSE - Abnormal    POC Glucose 235 (*)    POCT GLUCOSE - Abnormal    POC Glucose 206 (*)    MAGNESIUM - Normal    Magnesium 2.0     PHOSPHORUS - Normal    Phosphorus 4.4     BASIC METABOLIC PANEL   MAGNESIUM   PHOSPHORUS     CT abdomen pelvis with contrast   Final Interpretation by Fernando Stone MD (09/16 1485)      No acute intra-abdominal abnormality. Thickening of the distal esophagus likely related to esophagitis given history of vomiting.         Workstation performed: [id number]         XR chest 1 view portable   ED Interpretation by Cody Leija PA-C (09/16 1158)   ED Interpretation: No acute cardiopulmonary disease.          Procedures       Cody Leija PA-C  09/16/24 5016

## 2024-09-17 PROBLEM — E87.1 HYPONATREMIA: Status: RESOLVED | Noted: 2022-07-28 | Resolved: 2024-09-17

## 2024-09-17 LAB
ANION GAP SERPL CALCULATED.3IONS-SCNC: 8 MMOL/L (ref 4–13)
ANION GAP SERPL CALCULATED.3IONS-SCNC: 8 MMOL/L (ref 4–13)
BUN SERPL-MCNC: 12 MG/DL (ref 5–25)
BUN SERPL-MCNC: 13 MG/DL (ref 5–25)
CA-I BLD-SCNC: 1.18 MMOL/L (ref 1.12–1.32)
CALCIUM SERPL-MCNC: 8.5 MG/DL (ref 8.4–10.2)
CALCIUM SERPL-MCNC: 8.8 MG/DL (ref 8.4–10.2)
CHLORIDE SERPL-SCNC: 103 MMOL/L (ref 96–108)
CHLORIDE SERPL-SCNC: 104 MMOL/L (ref 96–108)
CO2 SERPL-SCNC: 23 MMOL/L (ref 21–32)
CO2 SERPL-SCNC: 24 MMOL/L (ref 21–32)
CREAT SERPL-MCNC: 0.58 MG/DL (ref 0.6–1.3)
CREAT SERPL-MCNC: 0.62 MG/DL (ref 0.6–1.3)
ERYTHROCYTE [DISTWIDTH] IN BLOOD BY AUTOMATED COUNT: 11.1 % (ref 11.6–15.1)
GFR SERPL CREATININE-BSD FRML MDRD: 136 ML/MIN/1.73SQ M
GFR SERPL CREATININE-BSD FRML MDRD: 140 ML/MIN/1.73SQ M
GLUCOSE SERPL-MCNC: 113 MG/DL (ref 65–140)
GLUCOSE SERPL-MCNC: 128 MG/DL (ref 65–140)
GLUCOSE SERPL-MCNC: 133 MG/DL (ref 65–140)
GLUCOSE SERPL-MCNC: 144 MG/DL (ref 65–140)
GLUCOSE SERPL-MCNC: 158 MG/DL (ref 65–140)
GLUCOSE SERPL-MCNC: 172 MG/DL (ref 65–140)
GLUCOSE SERPL-MCNC: 179 MG/DL (ref 65–140)
GLUCOSE SERPL-MCNC: 180 MG/DL (ref 65–140)
GLUCOSE SERPL-MCNC: 188 MG/DL (ref 65–140)
GLUCOSE SERPL-MCNC: 284 MG/DL (ref 65–140)
GLUCOSE SERPL-MCNC: 302 MG/DL (ref 65–140)
HCT VFR BLD AUTO: 41.5 % (ref 36.5–49.3)
HGB BLD-MCNC: 15.5 G/DL (ref 12–17)
MAGNESIUM SERPL-MCNC: 1.9 MG/DL (ref 1.9–2.7)
MAGNESIUM SERPL-MCNC: 2 MG/DL (ref 1.9–2.7)
MCH RBC QN AUTO: 30.3 PG (ref 26.8–34.3)
MCHC RBC AUTO-ENTMCNC: 37.3 G/DL (ref 31.4–37.4)
MCV RBC AUTO: 81 FL (ref 82–98)
PHOSPHATE SERPL-MCNC: 2.6 MG/DL (ref 2.7–4.5)
PHOSPHATE SERPL-MCNC: 2.6 MG/DL (ref 2.7–4.5)
PLATELET # BLD AUTO: 312 THOUSANDS/UL (ref 149–390)
PMV BLD AUTO: 9.2 FL (ref 8.9–12.7)
POTASSIUM SERPL-SCNC: 3.5 MMOL/L (ref 3.5–5.3)
POTASSIUM SERPL-SCNC: 3.5 MMOL/L (ref 3.5–5.3)
PROCALCITONIN SERPL-MCNC: <0.05 NG/ML
RBC # BLD AUTO: 5.12 MILLION/UL (ref 3.88–5.62)
SODIUM SERPL-SCNC: 135 MMOL/L (ref 135–147)
SODIUM SERPL-SCNC: 135 MMOL/L (ref 135–147)
WBC # BLD AUTO: 11.85 THOUSAND/UL (ref 4.31–10.16)

## 2024-09-17 PROCEDURE — 83735 ASSAY OF MAGNESIUM: CPT

## 2024-09-17 PROCEDURE — 82948 REAGENT STRIP/BLOOD GLUCOSE: CPT

## 2024-09-17 PROCEDURE — 80048 BASIC METABOLIC PNL TOTAL CA: CPT

## 2024-09-17 PROCEDURE — 84100 ASSAY OF PHOSPHORUS: CPT

## 2024-09-17 PROCEDURE — 82330 ASSAY OF CALCIUM: CPT

## 2024-09-17 PROCEDURE — NC001 PR NO CHARGE: Performed by: INTERNAL MEDICINE

## 2024-09-17 PROCEDURE — 85027 COMPLETE CBC AUTOMATED: CPT

## 2024-09-17 PROCEDURE — 99232 SBSQ HOSP IP/OBS MODERATE 35: CPT | Performed by: INTERNAL MEDICINE

## 2024-09-17 PROCEDURE — 84145 PROCALCITONIN (PCT): CPT

## 2024-09-17 RX ORDER — POTASSIUM CHLORIDE 1500 MG/1
20 TABLET, EXTENDED RELEASE ORAL ONCE
Status: COMPLETED | OUTPATIENT
Start: 2024-09-17 | End: 2024-09-17

## 2024-09-17 RX ORDER — PANTOPRAZOLE SODIUM 40 MG/10ML
40 INJECTION, POWDER, LYOPHILIZED, FOR SOLUTION INTRAVENOUS
Status: DISCONTINUED | OUTPATIENT
Start: 2024-09-17 | End: 2024-09-19

## 2024-09-17 RX ORDER — HYDROMORPHONE HCL IN WATER/PF 6 MG/30 ML
0.2 PATIENT CONTROLLED ANALGESIA SYRINGE INTRAVENOUS EVERY 6 HOURS PRN
Status: DISCONTINUED | OUTPATIENT
Start: 2024-09-17 | End: 2024-09-18

## 2024-09-17 RX ORDER — ACETAMINOPHEN 10 MG/ML
1000 INJECTION, SOLUTION INTRAVENOUS ONCE
Status: COMPLETED | OUTPATIENT
Start: 2024-09-17 | End: 2024-09-17

## 2024-09-17 RX ORDER — HYDROMORPHONE HCL/PF 1 MG/ML
0.5 SYRINGE (ML) INJECTION EVERY 6 HOURS PRN
Status: DISCONTINUED | OUTPATIENT
Start: 2024-09-17 | End: 2024-09-18

## 2024-09-17 RX ADMIN — SODIUM CHLORIDE 2 UNITS/HR: 9 INJECTION, SOLUTION INTRAVENOUS at 10:29

## 2024-09-17 RX ADMIN — POTASSIUM CHLORIDE 20 MEQ: 1500 TABLET, EXTENDED RELEASE ORAL at 07:42

## 2024-09-17 RX ADMIN — INSULIN LISPRO 1 UNITS: 100 INJECTION, SOLUTION INTRAVENOUS; SUBCUTANEOUS at 05:35

## 2024-09-17 RX ADMIN — CHLORHEXIDINE GLUCONATE 15 ML: 1.2 RINSE ORAL at 08:10

## 2024-09-17 RX ADMIN — HYDROMORPHONE HYDROCHLORIDE 0.2 MG: 0.2 INJECTION, SOLUTION INTRAMUSCULAR; INTRAVENOUS; SUBCUTANEOUS at 17:43

## 2024-09-17 RX ADMIN — ENOXAPARIN SODIUM 40 MG: 40 INJECTION SUBCUTANEOUS at 08:10

## 2024-09-17 RX ADMIN — POTASSIUM PHOSPHATE 12 MMOL: 236; 224 INJECTION, SOLUTION INTRAVENOUS at 08:09

## 2024-09-17 RX ADMIN — ONDANSETRON 4 MG: 2 INJECTION INTRAMUSCULAR; INTRAVENOUS at 07:43

## 2024-09-17 RX ADMIN — ACETAMINOPHEN 1000 MG: 10 INJECTION INTRAVENOUS at 08:33

## 2024-09-17 RX ADMIN — PANTOPRAZOLE SODIUM 40 MG: 40 INJECTION, POWDER, FOR SOLUTION INTRAVENOUS at 10:30

## 2024-09-17 NOTE — ASSESSMENT & PLAN NOTE
Lab Results   Component Value Date    HGBA1C >15.0 (H) 07/08/2024       Recent Labs     09/17/24  0534 09/17/24  1011 09/17/24  1158 09/17/24  1421   POCGLU 172* 188* 302* 128       Blood Sugar Average: Last 72 hrs:  (P) [id number]      T1DM: Agree with use of insulin infusion until eating reliably. Can resume the Novolin 70/30 30units bf and dinner once eating again with correctional insulin as needed while inpatient.

## 2024-09-17 NOTE — PROGRESS NOTES
Progress Note - Critical Care/ICU   Name: Sagar Acuna 24 y.o. male I MRN: [id number]  Unit/Bed#: ICU 12 I Date of Admission: 9/16/2024   Date of Service: 9/17/2024 I Hospital Day: 1       Critical Care Interval Transfer Note:    Brief Hospital Summary: 24 y.o. M with PMH of T1DM presented on 9/16 with a 2 day history of nausea, vomiting, abdominal pain, diarrhea, malaise, and poor appetite He was admitted for management of DKA. Patient reports that he takes 30 units of insulin 70/30 twice daily, but no mealtime bolus or sliding scale insulin. Anion gap closed x 2 last night, and he was given Lantus 15 units. He was unable to tolerate a regular diet this morning and vomited. Initiated non-DKA insulin gtt with q2h Accuchecks. De-escalated to full liquid toast/crax diet. Endocrinology consulted. Patient stable for downgrade to [facility].    Barriers to discharge:   Transition to basal/bolus regimen  Endocrinology consult  CM consult for assistance with prescription drug coverage     Consults: IP CONSULT TO CASE MANAGEMENT  IP CONSULT TO NUTRITION SERVICES  IP CONSULT TO CASE MANAGEMENT  IP CONSULT TO ENDOCRINOLOGY    Recommended to review admission imaging for incidental findings and document in discharge navigator: Chart reviewed, no known incidental findings noted at this time.      Discharge Plan: Anticipate discharge in 24-48 hrs to home.       Patient seen and evaluated by Critical Care today and deemed to be appropriate for transfer to Med Surg. Spoke to Dr. Ceja from [facility] to accept transfer. Critical care can be contacted via SecureChat with any questions or concerns.

## 2024-09-17 NOTE — CONSULTS
e-Consult (IPC)  - Endocrinology  Sagar Acuna 24 y.o. male MRN: [id number]  Unit/Bed#: ICU 12 Encounter: [id number]          Endocrinology has been consulted to evaluate Sagar Acuna    We were consulted by ICU concerning this patient with T1DM and medication non adherence. Has prior Dka episodes including two admission to [facility] in July 2024 for DKA.  Per records uses Novolin 70/30 30units bf and dinner due to cost constraints.   He had nausea, vomiting, abdominal pain, and poor appetite.   ICU attempted to transition off insulin infusion but he had additional vomiting this morning and insulin infusion resumed.     Inpatient consult to Endocrinology  Consult performed by: Kimberli Crawford MD  Consult ordered by: Guerita Greene MD        09/17/24    Assessment/Recommendation:   Problem List Items Addressed This Visit       Type 1 diabetes mellitus with hyperglycemia (HCC)     Lab Results   Component Value Date    HGBA1C >15.0 (H) 07/08/2024       Recent Labs     09/17/24  0534 09/17/24  1011 09/17/24  1158 09/17/24  1421   POCGLU 172* 188* 302* 128       Blood Sugar Average: Last 72 hrs:  (P) 184.9104760470150017      T1DM: Agree with use of insulin infusion until eating reliably. Can resume the Novolin 70/30 30units bf and dinner once eating again with correctional insulin as needed while inpatient.           Relevant Medications    insulin glargine (LANTUS) subcutaneous injection 15 Units 0.15 mL (Completed)    insulin regular (HumuLIN R,NovoLIN R) 1 Units/mL in sodium chloride 0.9 % 100 mL infusion    Other Relevant Orders    Inpatient consult to Case Management    Inpatient consult to Case Management    * (Principal) Diabetic ketoacidosis associated with type 1 diabetes mellitus (HCC)    Relevant Medications    insulin glargine (LANTUS) subcutaneous injection 15 Units 0.15 mL (Completed)    insulin regular (HumuLIN R,NovoLIN R) 1 Units/mL in sodium chloride 0.9 % 100 mL infusion    Other Relevant  Orders    Inpatient Consult to Nutrition Services    Inpatient consult to Endocrinology    Inpatient consult to Case Management     Other Visit Diagnoses       Diabetic ketoacidosis (HCC)    -  Primary    Relevant Medications    insulin glargine (LANTUS) subcutaneous injection 15 Units 0.15 mL (Completed)    insulin regular (HumuLIN R,NovoLIN R) 1 Units/mL in sodium chloride 0.9 % 100 mL infusion    Other Relevant Orders    Consult to Case Management    Inpatient consult to Case Management    Inpatient consult to Case Management    Nausea and vomiting        Esophagitis        Relevant Medications    pantoprazole (PROTONIX) injection 40 mg    Other Relevant Orders    Inpatient consult to Case Management                        5-10 minutes, >50% of the total time devoted to medical consultative verbal/EMR discussion between providers. Written report will be generated in the EMR.     Thank you for allowing Endocrinology to participate in the care of Sagar Acuna. Please don't hesitate to call message  us with any questions.     Kimberli Crawford MD

## 2024-09-17 NOTE — PLAN OF CARE
Problem: PAIN - ADULT  Goal: Verbalizes/displays adequate comfort level or baseline comfort level  Description: Interventions:  - Encourage patient to monitor pain and request assistance  - Assess pain using appropriate pain scale  - Administer analgesics based on type and severity of pain and evaluate response  - Implement non-pharmacological measures as appropriate and evaluate response  - Consider cultural and social influences on pain and pain management  - Notify physician/advanced practitioner if interventions unsuccessful or patient reports new pain  Outcome: Progressing     Problem: INFECTION - ADULT  Goal: Absence or prevention of progression during hospitalization  Description: INTERVENTIONS:  - Assess and monitor for signs and symptoms of infection  - Monitor lab/diagnostic results  - Monitor all insertion sites, i.e. indwelling lines, tubes, and drains  - Monitor endotracheal if appropriate and nasal secretions for changes in amount and color  - [place] appropriate cooling/warming therapies per order  - Administer medications as ordered  - Instruct and encourage patient and family to use good hand hygiene technique  - Identify and instruct in appropriate isolation precautions for identified infection/condition  Outcome: Progressing  Goal: Absence of fever/infection during neutropenic period  Description: INTERVENTIONS:  - Monitor WBC    Outcome: Progressing     Problem: SAFETY ADULT  Goal: Patient will remain free of falls  Description: INTERVENTIONS:  - Educate patient/family on patient safety including physical limitations  - Instruct patient to call for assistance with activity   - Consult OT/PT to assist with strengthening/mobility   - Keep Call bell within reach  - Keep bed low and locked with side rails adjusted as appropriate  - Keep care items and personal belongings within reach  - Initiate and maintain comfort rounds  - Make Fall Risk Sign visible to staff  - Offer Toileting every 2 Hours,  in advance of need  - Initiate/Maintain bed alarm  - Obtain necessary fall risk management equipment  - Apply yellow socks and bracelet for high fall risk patients  - Consider moving patient to room near nurses station  Outcome: Progressing  Goal: Maintain or return to baseline ADL function  Description: INTERVENTIONS:  -  Assess patient's ability to carry out ADLs; assess patient's baseline for ADL function and identify physical deficits which impact ability to perform ADLs (bathing, care of mouth/teeth, toileting, grooming, dressing, etc.)  - Assess/evaluate cause of self-care deficits   - Assess range of motion  - Assess patient's mobility; develop plan if impaired  - Assess patient's need for assistive devices and provide as appropriate  - Encourage maximum independence but intervene and supervise when necessary  - Involve family in performance of ADLs  - Assess for home care needs following discharge   - Consider OT consult to assist with ADL evaluation and planning for discharge  - Provide patient education as appropriate  Outcome: Progressing  Goal: Maintains/Returns to pre admission functional level  Description: INTERVENTIONS:  - Perform AM-PAC 6 Click Basic Mobility/ Daily Activity assessment daily.  - Set and communicate daily mobility goal to care team and patient/family/caregiver.   - Collaborate with rehabilitation services on mobility goals if consulted  - Perform Range of Motion 3 times a day.  - Reposition patient every 2 hours.  - Dangle patient 3 times a day  - Stand patient 3 times a day  - Ambulate patient 3 times a day  - Out of bed to chair 3 times a day   - Out of bed for meals 3 times a day  - Out of bed for toileting  - Record patient progress and toleration of activity level   Outcome: Progressing     Problem: DISCHARGE PLANNING  Goal: Discharge to home or other facility with appropriate resources  Description: INTERVENTIONS:  - Identify barriers to discharge w/patient and caregiver  -  Arrange for needed discharge resources and transportation as appropriate  - Identify discharge learning needs (meds, wound care, etc.)  - Arrange for interpretive services to assist at discharge as needed  - Refer to Case Management Department for coordinating discharge planning if the patient needs post-hospital services based on physician/advanced practitioner order or complex needs related to functional status, cognitive ability, or social support system  Outcome: Progressing     Problem: Knowledge Deficit  Goal: Patient/family/caregiver demonstrates understanding of disease process, treatment plan, medications, and discharge instructions  Description: Complete learning assessment and assess knowledge base.  Interventions:  - Provide teaching at level of understanding  - Provide teaching via preferred learning methods  Outcome: Progressing     Problem: METABOLIC, FLUID AND ELECTROLYTES - ADULT  Goal: Electrolytes maintained within normal limits  Description: INTERVENTIONS:  - Monitor labs and assess patient for signs and symptoms of electrolyte imbalances  - Administer electrolyte replacement as ordered  - Monitor response to electrolyte replacements, including repeat lab results as appropriate  - Instruct patient on fluid and nutrition as appropriate  Outcome: Progressing  Goal: Fluid balance maintained  Description: INTERVENTIONS:  - Monitor labs   - Monitor I/O and WT  - Instruct patient on fluid and nutrition as appropriate  - Assess for signs & symptoms of volume excess or deficit  Outcome: Progressing  Goal: Glucose maintained within target range  Description: INTERVENTIONS:  - Monitor Blood Glucose as ordered  - Assess for signs and symptoms of hyperglycemia and hypoglycemia  - Administer ordered medications to maintain glucose within target range  - Assess nutritional intake and initiate nutrition service referral as needed  Outcome: Progressing

## 2024-09-17 NOTE — PROGRESS NOTES
Progress Note - Critical Care/ICU   Name: Sagar Acuna 24 y.o. male I MRN: [id number]  Unit/Bed#: ICU 12 I Date of Admission: 9/16/2024   Date of Service: 9/17/2024 I Hospital Day: 1      Assessment & Plan  Diabetic ketoacidosis associated with type 1 diabetes mellitus (HCC)  Lab Results   Component Value Date    HGBA1C >15.0 (H) 07/08/2024       Recent Labs     09/16/24  2107 09/16/24  2201 09/17/24  0049 09/17/24  0534   POCGLU 84 146* 133 172*       Blood Sugar Average: Last 72 hrs:  (P) [id number]    Patient presented on 9/16 with 2 day history of malaise, poor appetite, nausea, multiple episodes of vomiting, abdominal pain, and several episodes of diarrhea.  Administers insulin 70/30 30 units BID. No mealtime bolus or sliding scale regimen due to financial difficulties.  POC glucose 300. Anion gap 21. VBG - pH 7.23, HC03 14. Beta hydroxybutyrate 9. UA with 4+ ketones  S/p Isolyte 2L and normal saline 1L boluses by EMS and ED  CT A/P - No acute intra-abdominal abnormality. Thickening of the distal esophagus likely related to esophagitis given history of vomiting.   Etiology: poor insulin compliance    Plan  Gap closed x 2 overnight. Given Lantus 15 units.  Given vomiting and inability to tolerate diet, will continue non-DKA insulin gtt  Accuchecks q2h   Full liquid diet ordered if patient can tolerate  Zofran prn for nausea  Hyponatremia (Resolved: 9/17/2024)  Recent Labs     09/16/24 2012 09/17/24  0158 09/17/24  0446   SODIUM 137 135 135     Likely component of pseudohyponatremia (2/2 hyperglycemia) and poor PO intake since onset of symptoms  SIRS (systemic inflammatory response syndrome) (HCC)  Met SIRS criteria with leukocytosis and HR > 90  Suspect WBC falsely elevated due to hemoconcentration  Suspect tachycardia secondary to volume depletion/dehydration  No suspected source of infection  Monitor off antibiotics  Disposition: Stepdown Level 1    ICU Core Measures     A: Assess, Prevent,  and Manage Pain Has pain been assessed? Yes  Need for changes to pain regimen? No   B: Both SAT/SAT  N/A   C: Choice of Sedation RASS Goal: N/A patient not on sedation  Need for changes to sedation or analgesia regimen? No   D: Delirium CAM-ICU: Negative   E: Early Mobility  Plan for early mobility? Yes   F: Family Engagement Plan for family engagement today? Yes         Prophylaxis:  VTE VTE covered by:  enoxaparin, Subcutaneous, 40 mg at 09/17/24 0810       Stress Ulcer  not ordered         24 Hour Events   24hr events: No acute overnight events. Gap closed x 2. Given Lantus 15 units last night, and started on sliding scale.  Subjective   Patient was seen and examined at bedside. He tried to eat a breakfast of pancakes and scrambled eggs, but vomited subsequently. Given Zofran for nausea. He was tolerating water overnight without vomiting. Reports generalized abdominal pain that is slightly improved from prior. No other complaints at this time.  Review of Systems: See HPI for Review of Systems    Objective                          Vitals I/O      Most Recent Min/Max in 24hrs   Temp 98 °F (36.7 °C) Temp  Min: 97.7 °F (36.5 °C)  Max: 98.6 °F (37 °C)   Pulse 102 Pulse  Min: 94  Max: 122   Resp 21 Resp  Min: 11  Max: 22   /75 BP  Min: 107/70  Max: 133/86   O2 Sat 98 % SpO2  Min: 97 %  Max: 100 %      Intake/Output Summary (Last 24 hours) at 9/17/2024 0926  Last data filed at 9/17/2024 0839  Gross per 24 hour   Intake 4870.92 ml   Output 2245 ml   Net 2625.92 ml       Diet [place]/CHO Controlled; Consistent Carbohydrate Diet Level 2 (5 carb servings/75 grams CHO/meal)    Invasive Monitoring           Physical Exam   Physical Exam  Eyes:      Extraocular Movements: Extraocular movements intact.      Pupils: Pupils are equal, round, and reactive to light.   Skin:     General: Skin is warm.   HENT:      Head: Normocephalic and atraumatic.      Mouth/Throat:      Mouth: Mucous membranes are dry.   Cardiovascular:       Rate and Rhythm: Regular rhythm. Tachycardia present.      Heart sounds: Normal heart sounds.   Musculoskeletal:         General: No swelling. Normal range of motion.      Right lower leg: No edema.      Left lower leg: No edema.   Abdominal:      Palpations: Abdomen is soft.      Tenderness: There is no abdominal tenderness (upper quadrants).   Constitutional:       Appearance: He is not ill-appearing.   Pulmonary:      Effort: Pulmonary effort is normal.      Breath sounds: Normal breath sounds.   Neurological:      Mental Status: He is alert and oriented to person, place and time.          Diagnostic Studies        Lab Results: I have reviewed the following results:      Medications:  Scheduled PRN   chlorhexidine, 15 mL, Q12H CRYSTAL  enoxaparin, 40 mg, Daily  insulin lispro, 1-5 Units, Q6H CRYSTAL  potassium phosphate, 12 mmol, Once      ondansetron, 4 mg, Q6H PRN  sodium chloride (PF), 3 mL, Q1H PRN  sodium chloride (PF), 3 mL, Q1H PRN       Continuous          Labs:   CBC    Recent Labs     09/16/24  1135 09/17/24  0446   WBC 14.73* 11.85*   HGB 18.5* 15.5   HCT 51.5* 41.5    312     BMP    Recent Labs     09/17/24  0158 09/17/24  0446   SODIUM 135 135   K 3.5 3.5    103   CO2 23 24   AGAP 8 8   BUN 13 12   CREATININE 0.62 0.58*   CALCIUM 8.5 8.8       Coags    No recent results     Additional Electrolytes  Recent Labs     09/17/24  0158 09/17/24  0446   MG 2.0 1.9   PHOS 2.6* 2.6*   CAIONIZED  --  1.18          Blood Gas    No recent results  Recent Labs     09/16/24  1135   PHVEN 7.236*   MXH9VRV 34.3*   PO2VEN 40.0   DCF8NID 14.2*   BEVEN -11.9   A2VSDOW 70.0    LFTs  Recent Labs     09/16/24  1135   ALT 14   AST 15   ALKPHOS 73   ALB 4.6   TBILI 0.89       Infectious  Recent Labs     09/16/24  1552 09/17/24  0446   PROCALCITONI <0.05 <0.05     Glucose  Recent Labs     09/16/24  1552 09/16/24  2012 09/17/24  0158 09/17/24  0446   GLUC 192* 87 158* 180*

## 2024-09-17 NOTE — PLAN OF CARE
Problem: SAFETY ADULT  Goal: Maintains/Returns to pre admission functional level  Description: INTERVENTIONS:  - Perform AM-PAC 6 Click Basic Mobility/ Daily Activity assessment daily.  - Set and communicate daily mobility goal to care team and patient/family/caregiver.   - Collaborate with rehabilitation services on mobility goals if consulted  - Perform Range of Motion 3 times a day.  - Reposition patient every 2 hours.  - Dangle patient 3 times a day  - Stand patient 3 times a day  - Ambulate patient 3 times a day  - Out of bed to chair 3 times a day   - Out of bed for meals 3 times a day  - Out of bed for toileting  - Record patient progress and toleration of activity level   9/17/2024 0701 by Kimmy Street RN  Outcome: Progressing  9/17/2024 0657 by Kimmy Street RN  Outcome: Progressing     Problem: DISCHARGE PLANNING  Goal: Discharge to home or other facility with appropriate resources  Description: INTERVENTIONS:  - Identify barriers to discharge w/patient and caregiver  - Arrange for needed discharge resources and transportation as appropriate  - Identify discharge learning needs (meds, wound care, etc.)  - Arrange for interpretive services to assist at discharge as needed  - Refer to Case Management Department for coordinating discharge planning if the patient needs post-hospital services based on physician/advanced practitioner order or complex needs related to functional status, cognitive ability, or social support system  9/17/2024 0701 by Kimmy Street RN  Outcome: Progressing  9/17/2024 0657 by Kimmy Street RN  Outcome: Progressing     Problem: Knowledge Deficit  Goal: Patient/family/caregiver demonstrates understanding of disease process, treatment plan, medications, and discharge instructions  Description: Complete learning assessment and assess knowledge base.  Interventions:  - Provide teaching at level of understanding  - Provide teaching via preferred learning methods  9/17/2024 0701 by  Kimmy Street RN  Outcome: Progressing  9/17/2024 0657 by Kimmy Street RN  Outcome: Progressing     Problem: METABOLIC, FLUID AND ELECTROLYTES - ADULT  Goal: Electrolytes maintained within normal limits  Description: INTERVENTIONS:  - Monitor labs and assess patient for signs and symptoms of electrolyte imbalances  - Administer electrolyte replacement as ordered  - Monitor response to electrolyte replacements, including repeat lab results as appropriate  - Instruct patient on fluid and nutrition as appropriate  9/17/2024 0701 by Kimmy Street RN  Outcome: Progressing  9/17/2024 0657 by Kimmy Street RN  Outcome: Progressing  Goal: Fluid balance maintained  Description: INTERVENTIONS:  - Monitor labs   - Monitor I/O and WT  - Instruct patient on fluid and nutrition as appropriate  - Assess for signs & symptoms of volume excess or deficit  9/17/2024 0701 by Kimmy Street RN  Outcome: Progressing  9/17/2024 0657 by Kimmy Street RN  Outcome: Progressing  Goal: Glucose maintained within target range  Description: INTERVENTIONS:  - Monitor Blood Glucose as ordered  - Assess for signs and symptoms of hyperglycemia and hypoglycemia  - Administer ordered medications to maintain glucose within target range  - Assess nutritional intake and initiate nutrition service referral as needed  9/17/2024 0701 by Kimmy Street RN  Outcome: Progressing  9/17/2024 0657 by Kimmy Street RN  Outcome: Progressing

## 2024-09-17 NOTE — CASE MANAGEMENT
Case Management Assessment & Discharge Planning Note    Patient name Sagar Acuna  Location ICU 12/ICU 12 MRN [id number]  : 2000 Date 2024       Current Admission Date: 2024  Current Admission Diagnosis:Diabetic ketoacidosis associated with type 1 diabetes mellitus (HCC)   Patient Active Problem List    Diagnosis Date Noted Date Diagnosed    Diabetic ketoacidosis associated with type 1 diabetes mellitus (HCC) 2024     SIRS (systemic inflammatory response syndrome) (HCC) 2024     Pseudohyponatremia 2023     Subaortic membrane 2022     Type 1 diabetes mellitus with hyperglycemia (HCC) 2022       LOS (days): 1  Geometric Mean LOS (GMLOS) (days):   Days to GMLOS:     OBJECTIVE:    Risk of Unplanned Readmission Score: 7.81         Current admission status: Inpatient       Preferred Pharmacy:   [id number] - [place] [address] [address]  Phone: [phone] Fax: [phone]    Primary Care Provider: No primary care provider on file.    Primary Insurance:   Secondary Insurance:     ASSESSMENT:  Active Health Care Proxies       Susie Acuna Care Representative - Mother   Primary Phone: [phone] (Mobile)                 Advance Directives  Does patient have a Health Care POA?: No  Was patient offered paperwork?: Yes (declined)  Does patient currently have a Health Care decision maker?: Yes, please see Health Care Proxy section  Does patient have Advance Directives?: No  Was patient offered paperwork?: Yes (declined)  Primary Contact: Susei Acuna (mother) [phone]    Patient Information  Admitted from:: Home  Mental Status: Alert  During Assessment patient was accompanied by: Not accompanied during assessment  Assessment information provided by:: Patient  Primary Caregiver: Self  Support Systems: Parent  County of Residence: [place]  What city do you live in?: [place]  entry access options. Select all that apply.: Stairs  Type of Current Residence: 2 story home  Upon entering residence, is there a bedroom on the main floor (no further steps)?: Yes  Upon entering residence, is there a bathroom on the main floor (no further steps)?: Yes  Living Arrangements:  (family members)  Is patient a ?: No    Activities of Daily Living Prior to Admission  Functional Status: Independent  Completes ADLs independently?: Yes  Ambulates independently?: Yes  Does patient use assisted devices?: No  Does patient currently own DME?: No  Does patient have a history of Outpatient Therapy (PT/OT)?: No  Does the patient have a history of Short-Term Rehab?: No  Does patient have a history of HHC?: No  Does patient currently have HHC?: No         Patient Information Continued  Income Source: Unemployed  Does patient have prescription coverage?: No  Does patient receive dialysis treatments?: No  Does patient have a history of substance abuse?: No  Does patient have a history of Mental Health Diagnosis?: No    PHQ 2/9 Screening   Reviewed PHQ 2/9 Depression Screening Score?: No    Means of Transportation  Means of Transport to Appts:: Public Transportation - [facility]      Social Determinants of Health (SDOH)      Flowsheet Row Most Recent Value   Housing Stability    In the last 12 months, was there a time when you were not able to pay the mortgage or rent on time? N   In the past 12 months, how many times have you moved where you were living? 1   At any time in the past 12 months, were you homeless or living in a shelter (including now)? N   Transportation Needs    In the past 12 months, has lack of transportation kept you from medical appointments or from getting medications? no   In the past 12 months, has lack of transportation kept you from meetings, work, or from getting things needed for daily living? No   Food Insecurity    Within the past 12 months, you worried that your food would run out before  you got the money to buy more. Never true   Within the past 12 months, the food you bought just didn't last and you didn't have money to get more. Never true   Utilities    In the past 12 months has the electric, gas, oil, or water company threatened to shut off services in your home? No            DISCHARGE DETAILS:    Discharge planning discussed with:: patient  Freedom of Choice: Yes  Comments - Freedom of Choice: Discussed discharge plans - Home with OP follow up for diabetes education  CM contacted family/caregiver?: No- see comments  Were Treatment Team discharge recommendations reviewed with patient/caregiver?: Yes  Did patient/caregiver verbalize understanding of patient care needs?: Yes  Were patient/caregiver advised of the risks associated with not following Treatment Team discharge recommendations?: Yes    Contacts  Patient Contacts: Susie Acuna (mother) [phone]  Relationship to Patient:: Family  Contact Method: Phone  Phone Number: Susie Acuna (mother) [phone]  Reason/Outcome: Continuity of Care, Emergency Contact    Requested Home Health Care         Is the patient interested in HHC at discharge?: No    DME Referral Provided  Referral made for DME?: No    Other Referral/Resources/Interventions Provided:  Financial Resources Provided: Financial Counselor, Indigent Medication    Would you like to participate in our [facility] service program?  : Yes    Treatment Team Recommendation: Home  Discharge Destination Plan:: Home        Additional Comments: CM met with the patient at the bedside for discharge planning and intake assessment. CM introduce self and reviewed role. CM inquired about the patient insurance status since no insurance was listed. The patient relied he may have insurance, his mother will be bringing the card. CM instructed to have his mother provide the nurse the card to scan it into the system. Since patient is unsure of insurance coverage, patient was referred to the  Financial Counselor for review of insurance and percentage of medication covered. CM discussed whether the patient takes diabetes medication at home to which the patient denies taking any medication outpatient. CM will continue to follow patient to assess for ability to pay for medication and will refer patient for outpatient case management when patient is more open to conversation. At the moment patient states he is having abdominal pain and nausea. Patient's primary nurse is at the bedside. CM department will continue to follow patient through hospital discharge.

## 2024-09-17 NOTE — ASSESSMENT & PLAN NOTE
Recent Labs     09/16/24 2012 09/17/24  0158 09/17/24  0446   SODIUM 137 135 135     Likely component of pseudohyponatremia (2/2 hyperglycemia) and poor PO intake since onset of symptoms

## 2024-09-17 NOTE — ASSESSMENT & PLAN NOTE
Lab Results   Component Value Date    HGBA1C >15.0 (H) 07/08/2024       Recent Labs     09/16/24  2107 09/16/24  2201 09/17/24  0049 09/17/24  0534   POCGLU 84 146* 133 172*       Blood Sugar Average: Last 72 hrs:  (P) [id number]    Patient presented on 9/16 with 2 day history of malaise, poor appetite, nausea, multiple episodes of vomiting, abdominal pain, and several episodes of diarrhea.  Administers insulin 70/30 30 units BID. No mealtime bolus or sliding scale regimen due to financial difficulties.  POC glucose 300. Anion gap 21. VBG - pH 7.23, HC03 14. Beta hydroxybutyrate 9. UA with 4+ ketones  S/p Isolyte 2L and normal saline 1L boluses by EMS and ED  CT A/P - No acute intra-abdominal abnormality. Thickening of the distal esophagus likely related to esophagitis given history of vomiting.   Etiology: poor insulin compliance    Plan  Gap closed x 2 overnight. Given Lantus 15 units.  Given vomiting and inability to tolerate diet, will continue non-DKA insulin gtt  Accuchecks q2h   Full liquid diet ordered if patient can tolerate  Zofran prn for nausea

## 2024-09-17 NOTE — UTILIZATION REVIEW
Initial Clinical Review    Admission: Date/Time/Statement:   Admission Orders (From admission, onward)       Ordered        09/16/24 1446  INPATIENT ADMISSION  Once                          Orders Placed This Encounter   Procedures    INPATIENT ADMISSION     Standing Status:   Standing     Number of Occurrences:   1     Order Specific Question:   Level of Care     Answer:   Level 1 Stepdown [13]     Order Specific Question:   Estimated length of stay     Answer:   More than 2 Midnights     Order Specific Question:   Certification     Answer:   I certify that inpatient services are medically necessary for this patient for a duration of greater than two midnights. See H&P and MD Progress Notes for additional information about the patient's course of treatment.     ED Arrival Information       Expected   -    Arrival   9/16/2024 10:30    Acuity   Emergent              Means of arrival   Ambulance    Escorted by   [facility] ([facility])    Service   Hospitalist    Admission type   Emergency              Arrival complaint   vomiting             Chief Complaint   Patient presents with    Vomiting     Pt arrives via ems for nausea and vomiting for 2 days with poor appetite, weakness, and dizziness.Pt c/o generalized abdominal pain  Pt did take his insulin last night and his glucose at home was 240.  Pt given 4 mg zofran pta        Initial Presentation: 24 y.o. male PMH  DM1 to ED by EMS as Inpatient ICU admission due to DKA  P/w 2 days of abdominal pain, nausea, innumerable vomiting w poor appetite, anorexia, malaise. Pta insulin 70/30 30u bid. Recently hospitalized @ nearby Facility  twice in July for DKA. Given 1 L normal saline and 4 mg Zofran by EMS prior to arrival,     EXAM  Disoriented, tachycardia, L CVA tenderness;   LABS: UA +4 Ketones, POC glucose 300. Anion gap 21 , VBG showing pH of 7.23 & HCO3=4.2, beta hydroxybutyrate elevated at 9.1.  given 2 L isolyte in ED, 4 mg Zofran.    ICU management , IV  insulin, IVF resuscitation; freq blood glucose/ BMP/ MAG/ PO4 Q 4 HR. Continue D5NS as POC glucose < 250, NPO, prn Zofran   Date: 9/17/2024   Day 2: ICU  He tried to eat a breakfast of pancakes and scrambled eggs, but vomited subsequently. Given Zofran for nausea. He was tolerating water overnight without vomiting. Reports generalized abdominal pain that is slightly improved from prior.   Gap closed x 2. Given Lantus 15 units last night, and started on sliding scale. Accuchecks q2h   Full liquid diet ordered if patient can tolerate  Zofran prn for nausea. Consult ENDO  Endo  ICU attempted to transition off insulin infusion but he had additional vomiting this morning and insulin infusion resumed.   Agree with use of insulin infusion until eating reliably. Can resume the Novolin 70/30 30units bf and dinner once eating again with correctional insulin as needed while inpatient.           ED Triage Vitals   Temperature Pulse Respirations Blood Pressure SpO2 Pain Score   09/16/24 1036 09/16/24 1036 09/16/24 1036 09/16/24 1036 09/16/24 1036 09/16/24 1141   97.7 °F (36.5 °C) (!) 110 18 112/75 100 % 8     Weight (last 2 days)       Date/Time Weight    09/17/24 0600 56.4 (124.34)    09/16/24 1543 52.2 (115.08)    09/16/24 1457 52.2 (115.08)    09/16/24 1139 52.2 (115.08)            Vital Signs (last 3 days)       Date/Time Temp Pulse Resp BP MAP (mmHg) SpO2 O2 Device Patient Position - Orthostatic VS [place] Coma Scale Score Pain    09/17/24 1500 -- 104 22 113/68 82 98 % -- -- -- --    09/17/24 1430 -- 102 20 118/78 91 99 % -- -- -- --    09/17/24 1200 98.1 °F (36.7 °C) 110 20 123/83 96 99 % None (Room air) Lying -- No Pain    09/17/24 1000 -- 92 19 109/69 82 98 % -- -- -- --    09/17/24 0800 -- -- 21 120/75 90 98 % -- -- -- --    09/17/24 0730 98 °F (36.7 °C) 102 12 119/79 92 98 % None (Room air) Lying 15 No Pain    09/17/24 0700 -- 100 19 110/73 84 98 % -- -- -- --    09/17/24 0400 98.5 °F (36.9 °C) 94 19 133/86 101 98 %  None (Room air) Lying 15 --    09/17/24 0300 -- 98 18 115/75 89 97 % None (Room air) Lying -- --    09/17/24 0200 -- 96 18 110/70 84 97 % None (Room air) Lying -- --    09/17/24 0100 -- 100 22 117/64 69 97 % None (Room air) Lying -- --    09/17/24 0000 98.1 °F (36.7 °C) 102 18 126/76 91 98 % None (Room air) Lying 15 --    09/16/24 2300 -- 106 19 116/67 90 97 % None (Room air) Lying -- --    09/16/24 2200 -- 116 11 121/71 85 98 % None (Room air) Lying -- --    09/16/24 2100 -- 114 18 119/77 91 98 % None (Room air) Lying -- --    09/16/24 2000 98.6 °F (37 °C) 110 17 107/70 81 98 % None (Room air) Lying 15 3    09/16/24 1900 -- 108 20 112/76 87 99 % None (Room air) Lying -- --    09/16/24 1800 -- 106 20 113/79 92 99 % None (Room air) Lying -- --    09/16/24 1700 -- 110 21 117/74 91 99 % -- -- -- --    09/16/24 1615 -- 107 22 -- -- 100 % -- -- -- --    09/16/24 1600 -- 107 19 107/75 86 100 % -- -- -- --    09/16/24 1543 98.1 °F (36.7 °C) 111 15 116/75 90 100 % None (Room air) Lying -- 5    09/16/24 1509 -- 122 20 119/69 -- 99 % None (Room air) Lying -- --    09/16/24 1500 -- 111 20 116/76 91 98 % None (Room air) Lying -- --    09/16/24 1400 -- 105 15 108/63 81 98 % None (Room air) Lying -- --    09/16/24 1141 -- 99 19 116/81 94 99 % -- -- -- 8    09/16/24 1046 -- -- -- -- -- -- None (Room air) -- -- --    09/16/24 1036 97.7 °F (36.5 °C) 110 18 112/75 -- 100 % None (Room air) Lying -- --              Pertinent Labs/Diagnostic Test Results:   Radiology:  CT abdomen pelvis with contrast   Final Interpretation by Fernando Stone MD (09/16 4838)      No acute intra-abdominal abnormality. Thickening of the distal esophagus likely related to esophagitis given history of vomiting.         Workstation performed: [id number]         XR chest 1 view portable   ED Interpretation by Cody Leija PA-C (09/16 6337)   ED Interpretation: No acute cardiopulmonary disease.      Final Interpretation by Fernando Lam MD (09/16 3738)       No acute cardiopulmonary disease.            Workstation performed: [id number]           Cardiology:  ECG 12 lead   Final Result by Marino Leach MD (09/16 1748)   Sinus tachycardia   Right atrial enlargement   Rightward axis   Pulmonary disease pattern   Abnormal ECG   When compared with ECG of 12-SEP-2022 03:02,   No significant change was found   Confirmed by Marino Leach ([id number]) on 9/16/2024 5:48:24 PM        GI:  No orders to display           Results from last 7 days   Lab Units 09/17/24  0446 09/16/24  1135   WBC Thousand/uL 11.85* 14.73*   HEMOGLOBIN g/dL 15.5 18.5*   HEMATOCRIT % 41.5 51.5*   PLATELETS Thousands/uL 312 370   TOTAL NEUT ABS Thousands/µL  --  12.12*         Results from last 7 days   Lab Units 09/17/24  0446 09/17/24  0158 09/16/24  2012 09/16/24  1552 09/16/24  1135   SODIUM mmol/L 135 135 137 133* 127*   POTASSIUM mmol/L 3.5 3.5 3.6 4.0 4.6   CHLORIDE mmol/L 103 104 106 98 90*   CO2 mmol/L 24 23 23 22 16*   ANION GAP mmol/L 8 8 8 13 21*   BUN mg/dL 12 13 13 18 25   CREATININE mg/dL 0.58* 0.62 0.64 0.72 0.89   EGFR ml/min/1.73sq m 140 136 135 128 118   CALCIUM mg/dL 8.8 8.5 8.7 8.8 9.8   CALCIUM, IONIZED mmol/L 1.18  --   --   --   --    MAGNESIUM mg/dL 1.9 2.0 2.3 2.0 2.0   PHOSPHORUS mg/dL 2.6* 2.6* 2.1* 3.0 4.4     Results from last 7 days   Lab Units 09/16/24  1135   AST U/L 15   ALT U/L 14   ALK PHOS U/L 73   TOTAL PROTEIN g/dL 7.5   ALBUMIN g/dL 4.6   TOTAL BILIRUBIN mg/dL 0.89     Results from last 7 days   Lab Units 09/17/24  1421 09/17/24  1158 09/17/24  1011 09/17/24  0534 09/17/24  0049 09/16/24  2201 09/16/24  2107 09/16/24  2000 09/16/24  1910 09/16/24  1808 09/16/24  1709 09/16/24  1615   POC GLUCOSE mg/dl 128 302* 188* 172* 133 146* 84 97 140 145* 179* 159*     Results from last 7 days   Lab Units 09/17/24  0446 09/17/24  0158 09/16/24 2012 09/16/24  1552 09/16/24  1135   GLUCOSE RANDOM mg/dL 180* 158* 87 192* 285*             Beta- Hydroxybutyrate   Date Value Ref  Range Status   09/16/2024 9.13 (H) 0.02 - 0.27 mmol/L Final   08/12/2024 0.09 0.02 - 0.27 mmol/L Final   03/09/2024 3.71 (H) 0.02 - 0.27 mmol/L Final          Results from last 7 days   Lab Units 09/16/24  1135   PH ROSALIE  7.236*   PCO2 ROSALIE mm Hg 34.3*   PO2 ROSALIE mm Hg 40.0   HCO3 ROSALIE mmol/L 14.2*   BASE EXC ROSALIE mmol/L -11.9   O2 CONTENT ROSALIE ml/dL 19.3   O2 HGB, VENOUS % 70.0                             Results from last 7 days   Lab Units 09/17/24  0446 09/16/24  1552   PROCALCITONIN ng/ml <0.05 <0.05                                     Results from last 7 days   Lab Units 09/16/24  1135   LIPASE u/L 10*                 Results from last 7 days   Lab Units 09/16/24  1314   CLARITY UA  Clear   COLOR UA  Light Yellow   SPEC GRAV UA  1.028   PH UA  5.5   GLUCOSE UA mg/dl >=1000 (1%)*   KETONES UA mg/dl >=150 (4+)*   BLOOD UA  Negative   PROTEIN UA mg/dl Trace*   NITRITE UA  Negative   BILIRUBIN UA  Negative   UROBILINOGEN UA (BE) mg/dl <2.0   LEUKOCYTES UA  Elevated glucose may cause decreased leukocyte values. See urine microscopic for UWBC result*   WBC UA /hpf None Seen   RBC UA /hpf None Seen   BACTERIA UA /hpf None Seen   EPITHELIAL CELLS WET PREP /hpf Occasional                                                   ED Treatment-Medication Administration from 09/16/2024 1030 to 09/16/2024 1519         Date/Time Order Dose Route Action     09/16/2024 1034 ondansetron (FOR EMS ONLY) (ZOFRAN) 4 mg/2 mL injection 4 mg 0 mg Does not apply Given to EMS     09/16/2024 1137 multi-electrolyte (ISOLYTE-S PH 7.4) bolus 2,000 mL 2,000 mL Intravenous New Bag     09/16/2024 1138 ondansetron (ZOFRAN) injection 4 mg 4 mg Intravenous Given     09/16/2024 1222 insulin regular (HumuLIN R,NovoLIN R) 1 Units/mL in sodium chloride 0.9 % 100 mL infusion 5.2 Units/hr Intravenous New Bag     09/16/2024 1248 iohexol (OMNIPAQUE) 350 MG/ML injection (SINGLE-DOSE) 85 mL 85 mL Intravenous Given     09/16/2024 1307 dextrose 5 % and sodium chloride  0.9 % infusion 250 mL/hr Intravenous New Bag            Past Medical History:   Diagnosis Date    Diabetes mellitus (HCC)     Glaucoma     Subaortic membrane      Present on Admission:   (Resolved) Hyponatremia   Type 1 diabetes mellitus with hyperglycemia (HCC)      Admitting Diagnosis: Diabetic ketoacidosis (HCC) [E11.10]  Vomiting [R11.10]  Esophagitis [K20.90]  Nausea and vomiting [R11.2]  Age/Sex: 24 y.o. male  Admission Orders:  tele  Continuous cardio-pulmonary & pulse oximetry  Freq neuro checks  Freq blood glucose  NPO  Adv to CL Liq diet  I/O  Daily WT      Scheduled Medications:  enoxaparin, 40 mg, Subcutaneous, Daily  pantoprazole, 40 mg, Intravenous, Q24H CRYSTAL      Continuous IV Infusions:  insulin regular (HumuLIN R,NovoLIN R) 1 Units/mL in sodium chloride 0.9 % 100 mL infusion, 0.3-21 Units/hr, Intravenous, Titrated      PRN Meds:  HYDROmorphone, 0.5 mg, Intravenous, Q6H PRN  HYDROmorphone, 0.2 mg, Intravenous, Q6H PRN  ondansetron, 4 mg, Intravenous, Q6H PRN  sodium chloride (PF), 3 mL, Intravenous, Q1H PRN        IP CONSULT TO CASE MANAGEMENT  IP CONSULT TO NUTRITION SERVICES  IP CONSULT TO CASE MANAGEMENT  IP CONSULT TO ENDOCRINOLOGY  IP CONSULT TO CASE MANAGEMENT    Network Utilization Review Department  ATTENTION: Please call with any questions or concerns to [phone] and carefully listen to the prompts so that you are directed to the right person. All voicemails are confidential.   For Discharge needs, contact Care Management DC Support Team at [phone] opt. [phone]  Send all requests for admission clinical reviews, approved or denied determinations and any other requests to dedicated fax number below belonging to the campus where the patient is receiving treatment. List of dedicated fax numbers for the Facilities:  FACILITY NAME UR FAX NUMBER   ADMISSION DENIALS (Administrative/Medical Necessity) [phone]   DISCHARGE SUPPORT TEAM (NETWORK) [phone]   [facility] CHILD [facility]  (Maternity/NICU/Pediatrics) [phone]   [facility] [phone]   [facility] [phone]   [facility] [phone]   [facility] [phone]   [facility] [phone]   [facility] [phone]   [facility] [phone]   [facility] [phone]   [facility] [phone]   [facility] [phone]   [facility] [phone]   [facility] [phone]

## 2024-09-17 NOTE — ED PROCEDURE NOTE
Procedure  CriticalCare Time    Date/Time: 9/16/2024 1:45 PM    Performed by: Cody Leija PA-C  Authorized by: Cody Leija PA-C    Critical care provider statement:     Critical care time (minutes):  35    Critical care time was exclusive of:  Separately billable procedures and treating other patients and teaching time    Critical care was necessary to treat or prevent imminent or life-threatening deterioration of the following conditions:  Endocrine crisis and metabolic crisis    Critical care was time spent personally by me on the following activities:  Blood draw for specimens, obtaining history from patient or surrogate, development of treatment plan with patient or surrogate, discussions with consultants, evaluation of patient's response to treatment, examination of patient, interpretation of cardiac output measurements, ordering and performing treatments and interventions, ordering and review of laboratory studies, re-evaluation of patient's condition, review of old charts and ordering and review of radiographic studies    I assumed direction of critical care for this patient from another provider in my specialty: no                     Cody Leija PA-C  09/16/24 9881

## 2024-09-18 PROBLEM — E83.42 HYPOMAGNESEMIA: Status: ACTIVE | Noted: 2024-09-18

## 2024-09-18 PROBLEM — E87.6 HYPOKALEMIA: Status: ACTIVE | Noted: 2024-09-18

## 2024-09-18 LAB
ANION GAP SERPL CALCULATED.3IONS-SCNC: 8 MMOL/L (ref 4–13)
BUN SERPL-MCNC: 6 MG/DL (ref 5–25)
CALCIUM SERPL-MCNC: 9.1 MG/DL (ref 8.4–10.2)
CHLORIDE SERPL-SCNC: 105 MMOL/L (ref 96–108)
CO2 SERPL-SCNC: 27 MMOL/L (ref 21–32)
CREAT SERPL-MCNC: 0.44 MG/DL (ref 0.6–1.3)
ERYTHROCYTE [DISTWIDTH] IN BLOOD BY AUTOMATED COUNT: 11.2 % (ref 11.6–15.1)
GFR SERPL CREATININE-BSD FRML MDRD: 157 ML/MIN/1.73SQ M
GLUCOSE SERPL-MCNC: 167 MG/DL (ref 65–140)
GLUCOSE SERPL-MCNC: 176 MG/DL (ref 65–140)
GLUCOSE SERPL-MCNC: 186 MG/DL (ref 65–140)
GLUCOSE SERPL-MCNC: 196 MG/DL (ref 65–140)
GLUCOSE SERPL-MCNC: 212 MG/DL (ref 65–140)
GLUCOSE SERPL-MCNC: 216 MG/DL (ref 65–140)
GLUCOSE SERPL-MCNC: 218 MG/DL (ref 65–140)
GLUCOSE SERPL-MCNC: 229 MG/DL (ref 65–140)
GLUCOSE SERPL-MCNC: 265 MG/DL (ref 65–140)
GLUCOSE SERPL-MCNC: 62 MG/DL (ref 65–140)
GLUCOSE SERPL-MCNC: 64 MG/DL (ref 65–140)
HCT VFR BLD AUTO: 44.1 % (ref 36.5–49.3)
HGB BLD-MCNC: 16.2 G/DL (ref 12–17)
MAGNESIUM SERPL-MCNC: 1.8 MG/DL (ref 1.9–2.7)
MCH RBC QN AUTO: 30.6 PG (ref 26.8–34.3)
MCHC RBC AUTO-ENTMCNC: 36.7 G/DL (ref 31.4–37.4)
MCV RBC AUTO: 83 FL (ref 82–98)
PHOSPHATE SERPL-MCNC: 2.9 MG/DL (ref 2.7–4.5)
PLATELET # BLD AUTO: 368 THOUSANDS/UL (ref 149–390)
PMV BLD AUTO: 9.3 FL (ref 8.9–12.7)
POTASSIUM SERPL-SCNC: 2.8 MMOL/L (ref 3.5–5.3)
RBC # BLD AUTO: 5.3 MILLION/UL (ref 3.88–5.62)
SODIUM SERPL-SCNC: 140 MMOL/L (ref 135–147)
WBC # BLD AUTO: 10.26 THOUSAND/UL (ref 4.31–10.16)

## 2024-09-18 PROCEDURE — 83735 ASSAY OF MAGNESIUM: CPT

## 2024-09-18 PROCEDURE — 99232 SBSQ HOSP IP/OBS MODERATE 35: CPT | Performed by: INTERNAL MEDICINE

## 2024-09-18 PROCEDURE — 80048 BASIC METABOLIC PNL TOTAL CA: CPT

## 2024-09-18 PROCEDURE — 84100 ASSAY OF PHOSPHORUS: CPT

## 2024-09-18 PROCEDURE — 82948 REAGENT STRIP/BLOOD GLUCOSE: CPT

## 2024-09-18 PROCEDURE — 85027 COMPLETE CBC AUTOMATED: CPT

## 2024-09-18 RX ORDER — MAGNESIUM SULFATE HEPTAHYDRATE 40 MG/ML
2 INJECTION, SOLUTION INTRAVENOUS ONCE
Status: COMPLETED | OUTPATIENT
Start: 2024-09-18 | End: 2024-09-18

## 2024-09-18 RX ORDER — SODIUM CHLORIDE, SODIUM GLUCONATE, SODIUM ACETATE, POTASSIUM CHLORIDE, MAGNESIUM CHLORIDE, SODIUM PHOSPHATE, DIBASIC, AND POTASSIUM PHOSPHATE .53; .5; .37; .037; .03; .012; .00082 G/100ML; G/100ML; G/100ML; G/100ML; G/100ML; G/100ML; G/100ML
75 INJECTION, SOLUTION INTRAVENOUS CONTINUOUS
Status: DISCONTINUED | OUTPATIENT
Start: 2024-09-18 | End: 2024-09-19 | Stop reason: HOSPADM

## 2024-09-18 RX ORDER — INSULIN ASPART 100 [IU]/ML
30 INJECTION, SUSPENSION SUBCUTANEOUS
Status: DISCONTINUED | OUTPATIENT
Start: 2024-09-18 | End: 2024-09-19 | Stop reason: HOSPADM

## 2024-09-18 RX ORDER — HYDROMORPHONE HCL IN WATER/PF 6 MG/30 ML
0.2 PATIENT CONTROLLED ANALGESIA SYRINGE INTRAVENOUS EVERY 6 HOURS PRN
Status: DISCONTINUED | OUTPATIENT
Start: 2024-09-18 | End: 2024-09-19

## 2024-09-18 RX ORDER — POTASSIUM CHLORIDE 14.9 MG/ML
20 INJECTION INTRAVENOUS 2 TIMES DAILY
Status: COMPLETED | OUTPATIENT
Start: 2024-09-18 | End: 2024-09-18

## 2024-09-18 RX ORDER — DIPHENHYDRAMINE HYDROCHLORIDE AND LIDOCAINE HYDROCHLORIDE AND ALUMINUM HYDROXIDE AND MAGNESIUM HYDRO
10 KIT EVERY 4 HOURS PRN
Status: DISCONTINUED | OUTPATIENT
Start: 2024-09-18 | End: 2024-09-19 | Stop reason: HOSPADM

## 2024-09-18 RX ORDER — POTASSIUM CHLORIDE 20MEQ/15ML
40 LIQUID (ML) ORAL ONCE
Status: COMPLETED | OUTPATIENT
Start: 2024-09-18 | End: 2024-09-18

## 2024-09-18 RX ADMIN — SODIUM CHLORIDE, SODIUM GLUCONATE, SODIUM ACETATE, POTASSIUM CHLORIDE, MAGNESIUM CHLORIDE, SODIUM PHOSPHATE, DIBASIC, AND POTASSIUM PHOSPHATE 75 ML/HR: .53; .5; .37; .037; .03; .012; .00082 INJECTION, SOLUTION INTRAVENOUS at 13:05

## 2024-09-18 RX ADMIN — POTASSIUM CHLORIDE 40 MEQ: 1.5 SOLUTION ORAL at 09:59

## 2024-09-18 RX ADMIN — MAGNESIUM SULFATE HEPTAHYDRATE 2 G: 40 INJECTION, SOLUTION INTRAVENOUS at 09:59

## 2024-09-18 RX ADMIN — POTASSIUM CHLORIDE 20 MEQ: 14.9 INJECTION, SOLUTION INTRAVENOUS at 18:43

## 2024-09-18 RX ADMIN — INSULIN ASPART 30 UNITS: 100 INJECTION, SUSPENSION SUBCUTANEOUS at 16:47

## 2024-09-18 RX ADMIN — HYDROMORPHONE HYDROCHLORIDE 0.5 MG: 1 INJECTION, SOLUTION INTRAMUSCULAR; INTRAVENOUS; SUBCUTANEOUS at 03:09

## 2024-09-18 RX ADMIN — POTASSIUM CHLORIDE 20 MEQ: 14.9 INJECTION, SOLUTION INTRAVENOUS at 12:13

## 2024-09-18 RX ADMIN — HYDROMORPHONE HYDROCHLORIDE 0.5 MG: 1 INJECTION, SOLUTION INTRAMUSCULAR; INTRAVENOUS; SUBCUTANEOUS at 09:34

## 2024-09-18 RX ADMIN — HYDROMORPHONE HYDROCHLORIDE 0.2 MG: 0.2 INJECTION, SOLUTION INTRAMUSCULAR; INTRAVENOUS; SUBCUTANEOUS at 19:11

## 2024-09-18 RX ADMIN — ENOXAPARIN SODIUM 40 MG: 40 INJECTION SUBCUTANEOUS at 09:37

## 2024-09-18 RX ADMIN — PANTOPRAZOLE SODIUM 40 MG: 40 INJECTION, POWDER, FOR SOLUTION INTRAVENOUS at 09:36

## 2024-09-18 NOTE — ASSESSMENT & PLAN NOTE
Lab Results   Component Value Date    HGBA1C >15.0 (H) 07/08/2024       Recent Labs     09/18/24  0608 09/18/24  0759 09/18/24  1029 09/18/24  1200   POCGLU 176* 167* 216* 186*       Blood Sugar Average: Last 72 hrs:  (P) 181.4932568744655093  Poor compliance with outpatient insulin with A1c greater than 15.  Endocrine input noted and appreciated.  Will need close outpatient endocrine follow-up on discharge.  Will be transition to 70/30 insulin twice daily upon discharge

## 2024-09-18 NOTE — ASSESSMENT & PLAN NOTE
Lab Results   Component Value Date    HGBA1C >15.0 (H) 07/08/2024       Recent Labs     09/18/24  0608 09/18/24  0759 09/18/24  1029 09/18/24  1200   POCGLU 176* 167* 216* 186*       Blood Sugar Average: Last 72 hrs:  (P) 181.6199423747253710  Patient presented on 9/16 with 2 day history of malaise, poor appetite, nausea, multiple episodes of vomiting, abdominal pain, and several episodes of diarrhea.  Administers insulin 70/30 30 units BID. No mealtime bolus or sliding scale regimen due to financial difficulties.  POC glucose 300. Anion gap 21. VBG - pH 7.23, HC03 14. Beta hydroxybutyrate 9. UA with 4+ ketones  S/p Isolyte 2L and normal saline 1L boluses by EMS and ED  CT A/P - No acute intra-abdominal abnormality. Thickening of the distal esophagus likely related to esophagitis given history of vomiting.   Etiology: poor insulin compliance  Patient was initially managed in intensive care unit for diabetic ketoacidosis and maintained on DKA protocol insulin gtt. with IV hydration and electrolyte monitoring and replacement.  Patient anion gap had closed and was transitioned to non-DKA insulin gtt on 9/17.  Still reports nausea and poor oral intake.  Continue non-DKA insulin gtt. till patient able to eat consistently.  Continue with Accu-Cheks every 2 hourly.  Denies any episode of vomiting.  Advance diet as tolerated.  Continue with as needed Zofran.  Replace electrolytes  Endocrine input appreciated

## 2024-09-18 NOTE — PLAN OF CARE
Problem: PAIN - ADULT  Goal: Verbalizes/displays adequate comfort level or baseline comfort level  Description: Interventions:  - Encourage patient to monitor pain and request assistance  - Assess pain using appropriate pain scale  - Administer analgesics based on type and severity of pain and evaluate response  - Implement non-pharmacological measures as appropriate and evaluate response  - Consider cultural and social influences on pain and pain management  - Notify physician/advanced practitioner if interventions unsuccessful or patient reports new pain  Outcome: Progressing     Problem: INFECTION - ADULT  Goal: Absence or prevention of progression during hospitalization  Description: INTERVENTIONS:  - Assess and monitor for signs and symptoms of infection  - Monitor lab/diagnostic results  - Monitor all insertion sites, i.e. indwelling lines, tubes, and drains  - Monitor endotracheal if appropriate and nasal secretions for changes in amount and color  - [place] appropriate cooling/warming therapies per order  - Administer medications as ordered  - Instruct and encourage patient and family to use good hand hygiene technique  - Identify and instruct in appropriate isolation precautions for identified infection/condition  Outcome: Progressing     Problem: SAFETY ADULT  Goal: Patient will remain free of falls  Description: INTERVENTIONS:  - Educate patient/family on patient safety including physical limitations  - Instruct patient to call for assistance with activity   - Consult OT/PT to assist with strengthening/mobility   - Keep Call bell within reach  - Keep bed low and locked with side rails adjusted as appropriate  - Keep care items and personal belongings within reach  - Initiate and maintain comfort rounds  - Make Fall Risk Sign visible to staff  - Offer Toileting every 2 Hours, in advance of need  - Initiate/Maintain alarm  - Obtain necessary fall risk management equipment  - Apply yellow socks and  bracelet for high fall risk patients  - Consider moving patient to room near nurses station  Outcome: Progressing

## 2024-09-18 NOTE — PROGRESS NOTES
Progress Note - Hospitalist   Name: Sagar Acuna 24 y.o. male I MRN: [id number]  Unit/Bed#: [place] [address] -02 I Date of Admission: 9/16/2024   Date of Service: 9/18/2024 I Hospital Day: 2    Assessment & Plan  Diabetic ketoacidosis associated with type 1 diabetes mellitus (HCC)  Lab Results   Component Value Date    HGBA1C >15.0 (H) 07/08/2024       Recent Labs     09/18/24  0608 09/18/24  0759 09/18/24  1029 09/18/24  1200   POCGLU 176* 167* 216* 186*       Blood Sugar Average: Last 72 hrs:  (P) [id number]  Patient presented on 9/16 with 2 day history of malaise, poor appetite, nausea, multiple episodes of vomiting, abdominal pain, and several episodes of diarrhea.  Administers insulin 70/30 30 units BID. No mealtime bolus or sliding scale regimen due to financial difficulties.  POC glucose 300. Anion gap 21. VBG - pH 7.23, HC03 14. Beta hydroxybutyrate 9. UA with 4+ ketones  S/p Isolyte 2L and normal saline 1L boluses by EMS and ED  CT A/P - No acute intra-abdominal abnormality. Thickening of the distal esophagus likely related to esophagitis given history of vomiting.   Etiology: poor insulin compliance  Patient was initially managed in intensive care unit for diabetic ketoacidosis and maintained on DKA protocol insulin gtt. with IV hydration and electrolyte monitoring and replacement.  Patient anion gap had closed and was transitioned to non-DKA insulin gtt on 9/17.  Still reports nausea and poor oral intake.  Continue non-DKA insulin gtt. till patient able to eat consistently.  Continue with Accu-Cheks every 2 hourly.  Denies any episode of vomiting.  Advance diet as tolerated.  Continue with as needed Zofran.  Replace electrolytes  Endocrine input appreciated     Type 1 diabetes mellitus with hyperglycemia (HCC)  Lab Results   Component Value Date    HGBA1C >15.0 (H) 07/08/2024       Recent Labs     09/18/24  0608 09/18/24  0759 09/18/24  1029 09/18/24  1200   POCGLU 176* 167* 216* 186*        Blood Sugar Average: Last 72 hrs:  (P) [id number]  Poor compliance with outpatient insulin with A1c greater than 15.  Endocrine input noted and appreciated.  Will need close outpatient endocrine follow-up on discharge.  Will be transition to 70/30 insulin twice daily upon discharge  SIRS (systemic inflammatory response syndrome) (HCC)  Met SIRS criteria with leukocytosis and HR > 90  Suspect WBC falsely elevated due to hemoconcentration  Suspect tachycardia secondary to volume depletion/dehydration  No suspected source of infection  Monitor off antibiotics  Hypokalemia  Replaced.  Continue to follow BMP every 12 hourly.  Hypomagnesemia  Replace.  Follow magnesium levels closely.    VTE Pharmacologic Prophylaxis:   High Risk (Score >/= 5) - Pharmacological DVT Prophylaxis Ordered: enoxaparin (Lovenox). Sequential Compression Devices Ordered.    Mobility:   Basic Mobility Inpatient Raw Score: 24  JH-HLM Goal: 8: Walk 250 feet or more  JH-HLM Achieved: 6: Walk 10 steps or more  JH-HLM Goal achieved. Continue to encourage appropriate mobility.    Patient Centered Rounds: I performed bedside rounds with nursing staff today.   Discussions with Specialists or Other Care Team Provider: Discussed with nursing    Education and Discussions with Family / Patient: Patient declined call to .     Current Length of Stay: 2 day(s)  Current Patient Status: Inpatient   Certification Statement: The patient will continue to require additional inpatient hospital stay due to ongoing monitoring of blood sugars and electrolyte replacement.  Discharge Plan: Anticipate discharge in 24-48 hrs to home.    Code Status: Level 1 - Full Code    Subjective   Seen and examined at bedside.  Still complains of nausea and poor oral intake.  Complains of loose teeth.  Complains of sore throat.    Objective     Vitals:   Temp (24hrs), Av.7 °F (37.1 °C), Min:98.1 °F (36.7 °C), Max:99.4 °F (37.4 °C)    Temp:  [98.1 °F (36.7  °C)-99.4 °F (37.4 °C)] 98.6 °F (37 °C)  HR:  [] 96  Resp:  [12-22] 16  BP: (105-123)/(67-85) 112/71  SpO2:  [97 %-100 %] 97 %  Body mass index is 18.68 kg/m².     Input and Output Summary (last 24 hours):     Intake/Output Summary (Last 24 hours) at 9/18/2024 1237  Last data filed at 9/18/2024 1204  Gross per 24 hour   Intake 270.12 ml   Output 3000 ml   Net -2729.88 ml       Physical Exam  Constitutional:       Appearance: He is ill-appearing.      Comments: Thin build.   HENT:      Mouth/Throat:      Mouth: Mucous membranes are dry.   Eyes:      Pupils: Pupils are equal, round, and reactive to light.   Cardiovascular:      Rate and Rhythm: Normal rate and regular rhythm.      Pulses: Normal pulses.   Pulmonary:      Effort: Pulmonary effort is normal.      Breath sounds: Normal breath sounds.   Abdominal:      General: Abdomen is flat. There is no distension.      Palpations: Abdomen is soft.      Tenderness: There is no abdominal tenderness. There is no guarding.   Musculoskeletal:      Right lower leg: No edema.      Left lower leg: No edema.   Skin:     General: Skin is warm.   Neurological:      General: No focal deficit present.      Mental Status: He is alert and oriented to person, place, and time. Mental status is at baseline.   Psychiatric:         Mood and Affect: Mood normal.          Lines/Drains:  Lines/Drains/Airways       Active Status       None                            Lab Results: I have reviewed the following results:    Results from last 7 days   Lab Units 09/18/24  0521 09/17/24  0446 09/16/24  1135   WBC Thousand/uL 10.26*   < > 14.73*   HEMOGLOBIN g/dL 16.2   < > 18.5*   HEMATOCRIT % 44.1   < > 51.5*   PLATELETS Thousands/uL 368   < > 370   SEGS PCT %  --   --  83*   LYMPHO PCT %  --   --  12*   MONO PCT %  --   --  5   EOS PCT %  --   --  0    < > = values in this interval not displayed.     Results from last 7 days   Lab Units 09/18/24  0521 09/16/24  1552 09/16/24  1135   SODIUM  mmol/L 140   < > 127*   POTASSIUM mmol/L 2.8*   < > 4.6   CHLORIDE mmol/L 105   < > 90*   CO2 mmol/L 27   < > 16*   BUN mg/dL 6   < > 25   CREATININE mg/dL 0.44*   < > 0.89   ANION GAP mmol/L 8   < > 21*   CALCIUM mg/dL 9.1   < > 9.8   ALBUMIN g/dL  --   --  4.6   TOTAL BILIRUBIN mg/dL  --   --  0.89   ALK PHOS U/L  --   --  73   ALT U/L  --   --  14   AST U/L  --   --  15   GLUCOSE RANDOM mg/dL 62*   < > 285*    < > = values in this interval not displayed.         Results from last 7 days   Lab Units 09/18/24  1200 09/18/24  1029 09/18/24  0759 09/18/24  0608 09/18/24  0509 09/18/24  0204 09/18/24  0003 09/17/24  2156 09/17/24  1958 09/17/24  1759 09/17/24  1550 09/17/24  1421   POC GLUCOSE mg/dl 186* 216* 167* 176* 64* 196* 229* 179* 113 284* 144* 128         Results from last 7 days   Lab Units 09/17/24  0446 09/16/24  1552   PROCALCITONIN ng/ml <0.05 <0.05       Recent Cultures (last 7 days):         Imaging Review: No pertinent imaging studies reviewed.  Other Studies: No additional pertinent studies reviewed.    Last 24 Hours Medication List:     Current Facility-Administered Medications:     enoxaparin (LOVENOX) subcutaneous injection 40 mg, Daily    HYDROmorphone (DILAUDID) injection 0.5 mg, Q6H PRN    HYDROmorphone HCl (DILAUDID) injection 0.2 mg, Q6H PRN    insulin regular (HumuLIN R,NovoLIN R) 1 Units/mL in sodium chloride 0.9 % 100 mL infusion, Titrated, Last Rate: 2 Units/hr (09/18/24 1213)    ondansetron (ZOFRAN) injection 4 mg, Q6H PRN    pantoprazole (PROTONIX) injection 40 mg, Q24H CRYSTAL    potassium chloride 20 mEq IVPB (premix), BID, Last Rate: 20 mEq (09/18/24 1213)    Insert peripheral IV, Once **AND** sodium chloride (PF) 0.9 % injection 3 mL, Q1H PRN    Administrative Statements   Today, Patient Was Seen By: Faith Junior MD      **Please Note: This note may have been constructed using a voice recognition system.**

## 2024-09-18 NOTE — PROGRESS NOTES
Patient is being transferred; report was give. Belongings are being moved with the patient as well.

## 2024-09-18 NOTE — PLAN OF CARE
Problem: PAIN - ADULT  Goal: Verbalizes/displays adequate comfort level or baseline comfort level  Description: Interventions:  - Encourage patient to monitor pain and request assistance  - Assess pain using appropriate pain scale  - Administer analgesics based on type and severity of pain and evaluate response  - Implement non-pharmacological measures as appropriate and evaluate response  - Consider cultural and social influences on pain and pain management  - Notify physician/advanced practitioner if interventions unsuccessful or patient reports new pain  Outcome: Progressing     Problem: INFECTION - ADULT  Goal: Absence or prevention of progression during hospitalization  Description: INTERVENTIONS:  - Assess and monitor for signs and symptoms of infection  - Monitor lab/diagnostic results  - Monitor all insertion sites, i.e. indwelling lines, tubes, and drains  - Monitor endotracheal if appropriate and nasal secretions for changes in amount and color  - [place] appropriate cooling/warming therapies per order  - Administer medications as ordered  - Instruct and encourage patient and family to use good hand hygiene technique  - Identify and instruct in appropriate isolation precautions for identified infection/condition  Outcome: Progressing  Goal: Absence of fever/infection during neutropenic period  Description: INTERVENTIONS:  - Monitor WBC    Outcome: Progressing     Problem: SAFETY ADULT  Goal: Patient will remain free of falls  Description: INTERVENTIONS:  - Educate patient/family on patient safety including physical limitations  - Instruct patient to call for assistance with activity   - Consult OT/PT to assist with strengthening/mobility   - Keep Call bell within reach  - Keep bed low and locked with side rails adjusted as appropriate  - Keep care items and personal belongings within reach  - Initiate and maintain comfort rounds  - Make Fall Risk Sign visible to staff  - Apply yellow socks and bracelet  for high fall risk patients  - Consider moving patient to room near nurses station  Outcome: Progressing  Goal: Maintain or return to baseline ADL function  Description: INTERVENTIONS:  -  Assess patient's ability to carry out ADLs; assess patient's baseline for ADL function and identify physical deficits which impact ability to perform ADLs (bathing, care of mouth/teeth, toileting, grooming, dressing, etc.)  - Assess/evaluate cause of self-care deficits   - Assess range of motion  - Assess patient's mobility; develop plan if impaired  - Assess patient's need for assistive devices and provide as appropriate  - Encourage maximum independence but intervene and supervise when necessary  - Involve family in performance of ADLs  - Assess for home care needs following discharge   - Consider OT consult to assist with ADL evaluation and planning for discharge  - Provide patient education as appropriate  Outcome: Progressing  Goal: Maintains/Returns to pre admission functional level  Description: INTERVENTIONS:  - Perform AM-PAC 6 Click Basic Mobility/ Daily Activity assessment daily.  - Set and communicate daily mobility goal to care team and patient/family/caregiver.   - Collaborate with rehabilitation services on mobility goals if consulted  - Perform Range of Motion 4 times a day.  - Reposition patient every 2 hours.  - Dangle patient 4 times a day  - Stand patient 4 times a day  - Ambulate patient 4 times a day  - Out of bed to chair 4 times a day   - Out of bed for meals 3 times a day  - Out of bed for toileting  - Record patient progress and toleration of activity level   Outcome: Progressing     Problem: DISCHARGE PLANNING  Goal: Discharge to home or other facility with appropriate resources  Description: INTERVENTIONS:  - Identify barriers to discharge w/patient and caregiver  - Arrange for needed discharge resources and transportation as appropriate  - Identify discharge learning needs (meds, wound care, etc.)  -  Arrange for interpretive services to assist at discharge as needed  - Refer to Case Management Department for coordinating discharge planning if the patient needs post-hospital services based on physician/advanced practitioner order or complex needs related to functional status, cognitive ability, or social support system  Outcome: Progressing     Problem: Knowledge Deficit  Goal: Patient/family/caregiver demonstrates understanding of disease process, treatment plan, medications, and discharge instructions  Description: Complete learning assessment and assess knowledge base.  Interventions:  - Provide teaching at level of understanding  - Provide teaching via preferred learning methods  Outcome: Progressing     Problem: METABOLIC, FLUID AND ELECTROLYTES - ADULT  Goal: Electrolytes maintained within normal limits  Description: INTERVENTIONS:  - Monitor labs and assess patient for signs and symptoms of electrolyte imbalances  - Administer electrolyte replacement as ordered  - Monitor response to electrolyte replacements, including repeat lab results as appropriate  - Instruct patient on fluid and nutrition as appropriate  Outcome: Progressing  Goal: Fluid balance maintained  Description: INTERVENTIONS:  - Monitor labs   - Monitor I/O and WT  - Instruct patient on fluid and nutrition as appropriate  - Assess for signs & symptoms of volume excess or deficit  Outcome: Progressing  Goal: Glucose maintained within target range  Description: INTERVENTIONS:  - Monitor Blood Glucose as ordered  - Assess for signs and symptoms of hyperglycemia and hypoglycemia  - Administer ordered medications to maintain glucose within target range  - Assess nutritional intake and initiate nutrition service referral as needed  Outcome: Progressing     Problem: Nutrition/Hydration-ADULT  Goal: Nutrient/Hydration intake appropriate for improving, restoring or maintaining nutritional needs  Description: Monitor and assess patient's  nutrition/hydration status for malnutrition. Collaborate with interdisciplinary team and initiate plan and interventions as ordered.  Monitor patient's weight and dietary intake as ordered or per policy. Utilize nutrition screening tool and intervene as necessary. Determine patient's food preferences and provide high-protein, high-caloric foods as appropriate.     INTERVENTIONS:  - Monitor oral intake, urinary output, labs, and treatment plans  - Assess nutrition and hydration status and recommend course of action  - Evaluate amount of meals eaten  - Assist patient with eating if necessary   - Allow adequate time for meals  - Recommend/ encourage appropriate diets, oral nutritional supplements, and vitamin/mineral supplements  - Order, calculate, and assess calorie counts as needed  - Recommend, monitor, and adjust tube feedings and TPN/PPN based on assessed needs  - Assess need for intravenous fluids  - Provide specific nutrition/hydration education as appropriate  - Include patient/family/caregiver in decisions related to nutrition  Outcome: Progressing

## 2024-09-18 NOTE — PLAN OF CARE
Problem: PAIN - ADULT  Goal: Verbalizes/displays adequate comfort level or baseline comfort level  Description: Interventions:  - Encourage patient to monitor pain and request assistance  - Assess pain using appropriate pain scale  - Administer analgesics based on type and severity of pain and evaluate response  - Implement non-pharmacological measures as appropriate and evaluate response  - Consider cultural and social influences on pain and pain management  - Notify physician/advanced practitioner if interventions unsuccessful or patient reports new pain  Outcome: Progressing     Problem: INFECTION - ADULT  Goal: Absence or prevention of progression during hospitalization  Description: INTERVENTIONS:  - Assess and monitor for signs and symptoms of infection  - Monitor lab/diagnostic results  - Monitor all insertion sites, i.e. indwelling lines, tubes, and drains  - Monitor endotracheal if appropriate and nasal secretions for changes in amount and color  - [place] appropriate cooling/warming therapies per order  - Administer medications as ordered  - Instruct and encourage patient and family to use good hand hygiene technique  - Identify and instruct in appropriate isolation precautions for identified infection/condition  Outcome: Progressing  Goal: Absence of fever/infection during neutropenic period  Description: INTERVENTIONS:  - Monitor WBC    Outcome: Progressing     Problem: SAFETY ADULT  Goal: Patient will remain free of falls  Description: INTERVENTIONS:  - Educate patient/family on patient safety including physical limitations  - Instruct patient to call for assistance with activity   - Consult OT/PT to assist with strengthening/mobility   - Keep Call bell within reach  - Keep bed low and locked with side rails adjusted as appropriate  - Keep care items and personal belongings within reach  - Initiate and maintain comfort rounds  - Make Fall Risk Sign visible to staff  - Offer Toileting every 2 Hours,  in advance of need  - Initiate/Maintain bed alarm  - Obtain necessary fall risk management equipment  - Apply yellow socks and bracelet for high fall risk patients  - Consider moving patient to room near nurses station  Outcome: Progressing  Goal: Maintain or return to baseline ADL function  Description: INTERVENTIONS:  -  Assess patient's ability to carry out ADLs; assess patient's baseline for ADL function and identify physical deficits which impact ability to perform ADLs (bathing, care of mouth/teeth, toileting, grooming, dressing, etc.)  - Assess/evaluate cause of self-care deficits   - Assess range of motion  - Assess patient's mobility; develop plan if impaired  - Assess patient's need for assistive devices and provide as appropriate  - Encourage maximum independence but intervene and supervise when necessary  - Involve family in performance of ADLs  - Assess for home care needs following discharge   - Consider OT consult to assist with ADL evaluation and planning for discharge  - Provide patient education as appropriate  Outcome: Progressing  Goal: Maintains/Returns to pre admission functional level  Description: INTERVENTIONS:  - Perform AM-PAC 6 Click Basic Mobility/ Daily Activity assessment daily.  - Set and communicate daily mobility goal to care team and patient/family/caregiver.   - Collaborate with rehabilitation services on mobility goals if consulted  - Perform Range of Motion 3 times a day.  - Reposition patient every 3 hours.  - Dangle patient 3 times a day  - Stand patient 3 times a day  - Ambulate patient 3 times a day  - Out of bed to chair 3 times a day   - Out of bed for meals 3 times a day  - Out of bed for toileting  - Record patient progress and toleration of activity level   Outcome: Progressing     Problem: DISCHARGE PLANNING  Goal: Discharge to home or other facility with appropriate resources  Description: INTERVENTIONS:  - Identify barriers to discharge w/patient and caregiver  -  Arrange for needed discharge resources and transportation as appropriate  - Identify discharge learning needs (meds, wound care, etc.)  - Arrange for interpretive services to assist at discharge as needed  - Refer to Case Management Department for coordinating discharge planning if the patient needs post-hospital services based on physician/advanced practitioner order or complex needs related to functional status, cognitive ability, or social support system  Outcome: Progressing     Problem: Knowledge Deficit  Goal: Patient/family/caregiver demonstrates understanding of disease process, treatment plan, medications, and discharge instructions  Description: Complete learning assessment and assess knowledge base.  Interventions:  - Provide teaching at level of understanding  - Provide teaching via preferred learning methods  Outcome: Progressing     Problem: METABOLIC, FLUID AND ELECTROLYTES - ADULT  Goal: Electrolytes maintained within normal limits  Description: INTERVENTIONS:  - Monitor labs and assess patient for signs and symptoms of electrolyte imbalances  - Administer electrolyte replacement as ordered  - Monitor response to electrolyte replacements, including repeat lab results as appropriate  - Instruct patient on fluid and nutrition as appropriate  Outcome: Progressing  Goal: Fluid balance maintained  Description: INTERVENTIONS:  - Monitor labs   - Monitor I/O and WT  - Instruct patient on fluid and nutrition as appropriate  - Assess for signs & symptoms of volume excess or deficit  Outcome: Progressing  Goal: Glucose maintained within target range  Description: INTERVENTIONS:  - Monitor Blood Glucose as ordered  - Assess for signs and symptoms of hyperglycemia and hypoglycemia  - Administer ordered medications to maintain glucose within target range  - Assess nutritional intake and initiate nutrition service referral as needed  Outcome: Progressing

## 2024-09-19 VITALS
DIASTOLIC BLOOD PRESSURE: 72 MMHG | SYSTOLIC BLOOD PRESSURE: 112 MMHG | BODY MASS INDEX: 18.71 KG/M2 | WEIGHT: 116.4 LBS | HEART RATE: 92 BPM | RESPIRATION RATE: 16 BRPM | OXYGEN SATURATION: 99 % | TEMPERATURE: 99.2 F | HEIGHT: 66 IN

## 2024-09-19 PROBLEM — R10.13 EPIGASTRIC DISCOMFORT: Status: ACTIVE | Noted: 2024-09-19

## 2024-09-19 LAB
ANION GAP SERPL CALCULATED.3IONS-SCNC: 3 MMOL/L (ref 4–13)
BASOPHILS # BLD AUTO: 0.04 THOUSANDS/ΜL (ref 0–0.1)
BASOPHILS NFR BLD AUTO: 1 % (ref 0–1)
BUN SERPL-MCNC: 7 MG/DL (ref 5–25)
CALCIUM SERPL-MCNC: 9 MG/DL (ref 8.4–10.2)
CHLORIDE SERPL-SCNC: 102 MMOL/L (ref 96–108)
CO2 SERPL-SCNC: 31 MMOL/L (ref 21–32)
CREAT SERPL-MCNC: 0.55 MG/DL (ref 0.6–1.3)
EOSINOPHIL # BLD AUTO: 0.03 THOUSAND/ΜL (ref 0–0.61)
EOSINOPHIL NFR BLD AUTO: 0 % (ref 0–6)
ERYTHROCYTE [DISTWIDTH] IN BLOOD BY AUTOMATED COUNT: 11.3 % (ref 11.6–15.1)
GFR SERPL CREATININE-BSD FRML MDRD: 143 ML/MIN/1.73SQ M
GLUCOSE SERPL-MCNC: 187 MG/DL (ref 65–140)
GLUCOSE SERPL-MCNC: 187 MG/DL (ref 65–140)
GLUCOSE SERPL-MCNC: 218 MG/DL (ref 65–140)
HCT VFR BLD AUTO: 42.3 % (ref 36.5–49.3)
HGB BLD-MCNC: 15.2 G/DL (ref 12–17)
IMM GRANULOCYTES # BLD AUTO: 0.02 THOUSAND/UL (ref 0–0.2)
IMM GRANULOCYTES NFR BLD AUTO: 0 % (ref 0–2)
LYMPHOCYTES # BLD AUTO: 2.26 THOUSANDS/ΜL (ref 0.6–4.47)
LYMPHOCYTES NFR BLD AUTO: 26 % (ref 14–44)
MAGNESIUM SERPL-MCNC: 2 MG/DL (ref 1.9–2.7)
MCH RBC QN AUTO: 30.5 PG (ref 26.8–34.3)
MCHC RBC AUTO-ENTMCNC: 35.9 G/DL (ref 31.4–37.4)
MCV RBC AUTO: 85 FL (ref 82–98)
MONOCYTES # BLD AUTO: 0.73 THOUSAND/ΜL (ref 0.17–1.22)
MONOCYTES NFR BLD AUTO: 8 % (ref 4–12)
NEUTROPHILS # BLD AUTO: 5.74 THOUSANDS/ΜL (ref 1.85–7.62)
NEUTS SEG NFR BLD AUTO: 65 % (ref 43–75)
NRBC BLD AUTO-RTO: 0 /100 WBCS
PLATELET # BLD AUTO: 298 THOUSANDS/UL (ref 149–390)
PMV BLD AUTO: 9.7 FL (ref 8.9–12.7)
POTASSIUM SERPL-SCNC: 3.6 MMOL/L (ref 3.5–5.3)
RBC # BLD AUTO: 4.98 MILLION/UL (ref 3.88–5.62)
SODIUM SERPL-SCNC: 136 MMOL/L (ref 135–147)
WBC # BLD AUTO: 8.82 THOUSAND/UL (ref 4.31–10.16)

## 2024-09-19 PROCEDURE — 82948 REAGENT STRIP/BLOOD GLUCOSE: CPT

## 2024-09-19 PROCEDURE — 85025 COMPLETE CBC W/AUTO DIFF WBC: CPT | Performed by: INTERNAL MEDICINE

## 2024-09-19 PROCEDURE — 83735 ASSAY OF MAGNESIUM: CPT | Performed by: INTERNAL MEDICINE

## 2024-09-19 PROCEDURE — 99239 HOSP IP/OBS DSCHRG MGMT >30: CPT | Performed by: INTERNAL MEDICINE

## 2024-09-19 PROCEDURE — 80048 BASIC METABOLIC PNL TOTAL CA: CPT | Performed by: INTERNAL MEDICINE

## 2024-09-19 RX ORDER — ONDANSETRON 4 MG/1
4 TABLET, FILM COATED ORAL EVERY 8 HOURS PRN
Qty: 20 TABLET | Refills: 0 | Status: SHIPPED | OUTPATIENT
Start: 2024-09-19

## 2024-09-19 RX ORDER — OXYCODONE HYDROCHLORIDE 5 MG/1
5 TABLET ORAL EVERY 6 HOURS PRN
Status: DISCONTINUED | OUTPATIENT
Start: 2024-09-19 | End: 2024-09-19 | Stop reason: HOSPADM

## 2024-09-19 RX ORDER — ACETAMINOPHEN 325 MG/1
650 TABLET ORAL EVERY 6 HOURS PRN
Status: DISCONTINUED | OUTPATIENT
Start: 2024-09-19 | End: 2024-09-19 | Stop reason: HOSPADM

## 2024-09-19 RX ORDER — PANTOPRAZOLE SODIUM 40 MG/1
40 TABLET, DELAYED RELEASE ORAL
Status: DISCONTINUED | OUTPATIENT
Start: 2024-09-20 | End: 2024-09-19 | Stop reason: HOSPADM

## 2024-09-19 RX ORDER — SUCRALFATE 1 G/1
1 TABLET ORAL 4 TIMES DAILY
Qty: 40 TABLET | Refills: 0 | Status: SHIPPED | OUTPATIENT
Start: 2024-09-19 | End: 2024-09-29

## 2024-09-19 RX ORDER — ACETAMINOPHEN 325 MG/1
650 TABLET ORAL EVERY 6 HOURS PRN
Qty: 30 TABLET | Refills: 0 | Status: SHIPPED | OUTPATIENT
Start: 2024-09-19

## 2024-09-19 RX ORDER — INSULIN HUMAN 100 [IU]/ML
32 INJECTION, SUSPENSION SUBCUTANEOUS
Qty: 15 ML | Refills: 0 | Status: SHIPPED | OUTPATIENT
Start: 2024-09-19 | End: 2024-10-19

## 2024-09-19 RX ORDER — PANTOPRAZOLE SODIUM 40 MG/1
40 TABLET, DELAYED RELEASE ORAL DAILY
Qty: 30 TABLET | Refills: 0 | Status: SHIPPED | OUTPATIENT
Start: 2024-09-19

## 2024-09-19 RX ORDER — PEN NEEDLE, DIABETIC 32GX 5/32"
NEEDLE, DISPOSABLE MISCELLANEOUS
Qty: 100 EACH | Refills: 0 | Status: SHIPPED | OUTPATIENT
Start: 2024-09-19

## 2024-09-19 RX ADMIN — PANTOPRAZOLE SODIUM 40 MG: 40 INJECTION, POWDER, FOR SOLUTION INTRAVENOUS at 08:21

## 2024-09-19 RX ADMIN — SODIUM CHLORIDE, SODIUM GLUCONATE, SODIUM ACETATE, POTASSIUM CHLORIDE, MAGNESIUM CHLORIDE, SODIUM PHOSPHATE, DIBASIC, AND POTASSIUM PHOSPHATE 75 ML/HR: .53; .5; .37; .037; .03; .012; .00082 INJECTION, SOLUTION INTRAVENOUS at 02:51

## 2024-09-19 RX ADMIN — INSULIN ASPART 30 UNITS: 100 INJECTION, SUSPENSION SUBCUTANEOUS at 08:20

## 2024-09-19 RX ADMIN — DIPHENHYDRAMINE HYDROCHLORIDE AND LIDOCAINE HYDROCHLORIDE AND ALUMINUM HYDROXIDE AND MAGNESIUM HYDRO 10 ML: KIT at 05:07

## 2024-09-19 RX ADMIN — OXYCODONE HYDROCHLORIDE 5 MG: 5 TABLET ORAL at 08:21

## 2024-09-19 RX ADMIN — ENOXAPARIN SODIUM 40 MG: 40 INJECTION SUBCUTANEOUS at 08:20

## 2024-09-19 RX ADMIN — ACETAMINOPHEN 650 MG: 325 TABLET ORAL at 00:53

## 2024-09-19 RX ADMIN — ONDANSETRON 4 MG: 2 INJECTION INTRAMUSCULAR; INTRAVENOUS at 12:02

## 2024-09-19 RX ADMIN — OXYCODONE HYDROCHLORIDE 5 MG: 5 TABLET ORAL at 00:53

## 2024-09-19 NOTE — PLAN OF CARE
Problem: PAIN - ADULT  Goal: Verbalizes/displays adequate comfort level or baseline comfort level  Description: Interventions:  - Encourage patient to monitor pain and request assistance  - Assess pain using appropriate pain scale  - Administer analgesics based on type and severity of pain and evaluate response  - Implement non-pharmacological measures as appropriate and evaluate response  - Consider cultural and social influences on pain and pain management  - Notify physician/advanced practitioner if interventions unsuccessful or patient reports new pain  Outcome: Progressing     Problem: INFECTION - ADULT  Goal: Absence or prevention of progression during hospitalization  Description: INTERVENTIONS:  - Assess and monitor for signs and symptoms of infection  - Monitor lab/diagnostic results  - Monitor all insertion sites, i.e. indwelling lines, tubes, and drains  - Monitor endotracheal if appropriate and nasal secretions for changes in amount and color  - [place] appropriate cooling/warming therapies per order  - Administer medications as ordered  - Instruct and encourage patient and family to use good hand hygiene technique  - Identify and instruct in appropriate isolation precautions for identified infection/condition  Outcome: Progressing     Problem: SAFETY ADULT  Goal: Patient will remain free of falls  Description: INTERVENTIONS:  - Educate patient/family on patient safety including physical limitations  - Instruct patient to call for assistance with activity   - Consult OT/PT to assist with strengthening/mobility   - Keep Call bell within reach  - Keep bed low and locked with side rails adjusted as appropriate  - Keep care items and personal belongings within reach  - Initiate and maintain comfort rounds  - Make Fall Risk Sign visible to staff  - Offer Toileting every 2 Hours, in advance of need  - Initiate/Maintain alarm  - Obtain necessary fall risk management equipment  - Apply yellow socks and  bracelet for high fall risk patients  - Consider moving patient to room near nurses station  Outcome: Progressing     Problem: Nutrition/Hydration-ADULT  Goal: Nutrient/Hydration intake appropriate for improving, restoring or maintaining nutritional needs  Description: Monitor and assess patient's nutrition/hydration status for malnutrition. Collaborate with interdisciplinary team and initiate plan and interventions as ordered.  Monitor patient's weight and dietary intake as ordered or per policy. Utilize nutrition screening tool and intervene as necessary. Determine patient's food preferences and provide high-protein, high-caloric foods as appropriate.     INTERVENTIONS:  - Monitor oral intake, urinary output, labs, and treatment plans  - Assess nutrition and hydration status and recommend course of action  - Evaluate amount of meals eaten  - Assist patient with eating if necessary   - Allow adequate time for meals  - Recommend/ encourage appropriate diets, oral nutritional supplements, and vitamin/mineral supplements  - Order, calculate, and assess calorie counts as needed  - Recommend, monitor, and adjust tube feedings and TPN/PPN based on assessed needs  - Assess need for intravenous fluids  - Provide specific nutrition/hydration education as appropriate  - Include patient/family/caregiver in decisions related to nutrition  Outcome: Progressing

## 2024-09-19 NOTE — CASE MANAGEMENT
Case Management Discharge Planning Note    Patient name Sagar Acuna  Location [facility] 2 /South 2 M* MRN [id number]  : 2000 Date 2024       Current Admission Date: 2024  Current Admission Diagnosis:Diabetic ketoacidosis associated with type 1 diabetes mellitus (HCC)   Patient Active Problem List    Diagnosis Date Noted Date Diagnosed    Epigastric discomfort 2024     Hypokalemia 2024     Hypomagnesemia 2024     Diabetic ketoacidosis associated with type 1 diabetes mellitus (HCC) 2024     SIRS (systemic inflammatory response syndrome) (HCC) 2024     Pseudohyponatremia 2023     Subaortic membrane 2022     Type 1 diabetes mellitus with hyperglycemia (HCC) 2022       LOS (days): 3  Geometric Mean LOS (GMLOS) (days):   Days to GMLOS:     OBJECTIVE:  Risk of Unplanned Readmission Score: 8.71         Current admission status: Inpatient   Preferred Pharmacy:   [id number] - [place] [address] [address]  Phone: [phone] Fax: [phone]    Primary Care Provider: Doris Flores    Primary Insurance: [employer] PENDING  Secondary Insurance:     DISCHARGE DETAILS:                                          Other Referral/Resources/Interventions Provided:  Financial Resources Provided: Indigent Medication ($251.16)  Interventions: Outpatient , PCP (Call to PCP requesting Pt outreach ([facility]) and TCM appointment to be made)         Treatment Team Recommendation: Home  Discharge Destination Plan:: Home  Transport at Discharge : Auto with designated

## 2024-09-19 NOTE — NURSING NOTE
Pt d/cd to home, accompanied by mother. No s/s of any distress noted. Discharge instructions given to both pt and his mother, pt stated that he understood instructions. [facility]  [facility]. Pt took all personal belongings.

## 2024-09-19 NOTE — DISCHARGE SUMMARY
Discharge Summary - Hospitalist   Name: Sagar Acuna 24 y.o. male I MRN: [id number]  Unit/Bed#: [place] [address] -02 I Date of Admission: 9/16/2024   Date of Service: 9/19/2024 I Hospital Day: 3     Assessment & Plan  Diabetic ketoacidosis associated with type 1 diabetes mellitus (HCC)  Lab Results   Component Value Date    HGBA1C >15.0 (H) 07/08/2024       Recent Labs     09/18/24  1411 09/18/24  1601 09/18/24  2116 09/19/24  0750   POCGLU 265* 212* 218* 187*       Blood Sugar Average: Last 72 hrs:  (P) 186.8262601837879190  Patient presented on 9/16 with 2 day history of malaise, poor appetite, nausea, multiple episodes of vomiting, abdominal pain, and several episodes of diarrhea.  Administers insulin 70/30 30 units BID. No mealtime bolus or sliding scale regimen due to financial difficulties.  POC glucose 300. Anion gap 21. VBG - pH 7.23, HC03 14. Beta hydroxybutyrate 9. UA with 4+ ketones  S/p Isolyte 2L and normal saline 1L boluses by EMS and ED  CT A/P - No acute intra-abdominal abnormality. Thickening of the distal esophagus likely related to esophagitis given history of vomiting.   Etiology: poor insulin compliance  Patient was initially managed in intensive care unit for diabetic ketoacidosis and maintained on DKA protocol insulin gtt. with IV hydration and electrolyte monitoring and replacement.  Patient anion gap had closed and was transitioned to non-DKA insulin gtt on 9/17.  Still reports nausea and poor oral intake.  Continue non-DKA insulin gtt. till patient able to eat consistently.  Continue with Accu-Cheks every 2 hourly.  Denies any episode of vomiting.  Advanced diet as tolerated.  Patient is tolerating diet well without nausea vomiting or worsening abdominal discomfort.  Transition to insulin 70/30 30 units twice daily.  Increase dose to 32 units twice daily upon discharge.  Recommended close outpatient follow-up with [facility] endocrinology and monitoring of blood sugars upon  discharge.    Endocrine input appreciated     Type 1 diabetes mellitus with hyperglycemia (HCC)  Lab Results   Component Value Date    HGBA1C >15.0 (H) 07/08/2024       Recent Labs     09/18/24  1411 09/18/24  1601 09/18/24  2116 09/19/24  0750   POCGLU 265* 212* 218* 187*       Blood Sugar Average: Last 72 hrs:  (P) [id number]  Poor compliance with outpatient insulin with A1c greater than 15.  Endocrine input noted and appreciated.  Will need close outpatient endocrine follow-up on discharge.  Will be transition to 70/30 insulin twice daily upon discharge.  Insulin prescription sent to [facility] pharmacy for case management to provide upon discharge.  SIRS (systemic inflammatory response syndrome) (HCC)  Met SIRS criteria with leukocytosis and HR > 90  Suspect WBC falsely elevated due to hemoconcentration  Suspect tachycardia secondary to volume depletion/dehydration  No suspected source of infection  Monitor off antibiotics  Hypokalemia  Replaced.  Potassium has normalized.  Hypomagnesemia  Replaced.  Follow magnesium levels closely.  Epigastric discomfort  Likely in the setting of gastritis.  Continue with PPI daily and trial of Carafate.  Continue with as needed antiemetics.  Recommended patient to avoid NSAID and gastric irritants.     Medical Problems       Resolved Problems  Date Reviewed: 3/10/2024            Resolved    Hyponatremia 9/17/2024     Resolved by  Guerita Greene MD        Discharging Physician / Practitioner: Faith Junior MD  PCP: Doris Flores  Admission Date:   Admission Orders (From admission, onward)       Ordered        09/16/24 1446  INPATIENT ADMISSION  Once                          Discharge Date: 09/19/24    Consultations During Hospital Stay:  Endocrinology    Procedures Performed:   CT abdomen no acute intracranial abnormality thickening of the esophagus related to esophagitis    Significant Findings / Test Results:   Hemoglobin A1c greater than 15    Incidental Findings:  "  NA       Test Results Pending at Discharge (will require follow up):   NA     Outpatient Tests Requested:  NA    Complications:  None     Reason for Admission: Elevated blood glucose levels    Hospital Course:   Sagar Acuna is a 24 y.o. male patient who originally presented to the hospital on 9/16/2024 due to hyper glycemia secondary to diabetic ketoacidosis.  Initially managed in the intensive care unit on DKA protocol.  Subsequently once anion gap is closed, he was transitioned to non-DKA insulin gtt. as oral intake was still poor.  Once his oral intake had improved and there was no nausea, vomiting he was started on insulin 70/30 30 units twice daily.  Outpatient insulin was set up by case management.  Patient was counseled regarding compliance with diabetic follow-up with endocrinologist as well as taking his insulin regularly and compliant with diet and follow-up.  Of note patient did have epigastric discomfort secondary to esophagitis treated with as needed antiemetics, IV hydration, Protonix and was discharged on Protonix and Carafate with as needed antiemetics.  Patient has been tolerating his food for 24 hours without nausea and vomiting.  Referral was also provided to [facility] dentist upon discharge as patient had multiple loose teeth.  Patient is being discharged in a stable condition.          Please see above list of diagnoses and related plan for additional information.     Condition at Discharge: stable    Discharge Day Visit / Exam:   Subjective: No nausea vomiting reported by nursing.  Patient tolerating diet well.  Complains of occasional epigastric discomfort.  Vitals: Blood Pressure: 112/72 (09/19/24 0751)  Pulse: 92 (09/19/24 0751)  Temperature: 99.2 °F (37.3 °C) (09/19/24 0751)  Temp Source: Oral (09/19/24 0751)  Respirations: 16 (09/19/24 0751)  Height: 5' 6\" (167.6 cm) (09/17/24 2228)  Weight - Scale: 52.8 kg (116 lb 6.5 oz) (09/19/24 0551)  SpO2: 99 % (09/19/24 0751)  Exam: "   Physical Exam  Constitutional:       General: He is not in acute distress.     Appearance: He is ill-appearing.   HENT:      Head: Normocephalic.      Nose: Nose normal.      Mouth/Throat:      Mouth: Mucous membranes are moist.   Eyes:      Extraocular Movements: Extraocular movements intact.      Pupils: Pupils are equal, round, and reactive to light.   Cardiovascular:      Rate and Rhythm: Normal rate and regular rhythm.   Pulmonary:      Effort: Pulmonary effort is normal.   Abdominal:      General: Abdomen is flat. Bowel sounds are normal. There is no distension.      Palpations: Abdomen is soft.      Tenderness: There is no abdominal tenderness. There is no guarding.   Musculoskeletal:      Cervical back: Neck supple.      Right lower leg: No edema.      Left lower leg: No edema.   Neurological:      General: No focal deficit present.      Mental Status: He is alert and oriented to person, place, and time. Mental status is at baseline.          Discussion with Family: Attempted to update  (mother) via phone. Left voicemail.     Discharge instructions/Information to patient and family:   See after visit summary for information provided to patient and family.      Provisions for Follow-Up Care:  See after visit summary for information related to follow-up care and any pertinent home health orders.      Mobility at time of Discharge:   Basic Mobility Inpatient Raw Score: 24  JH-HLM Goal: 8: Walk 250 feet or more  JH-HLM Achieved: 3: Sit at edge of bed  HLM Goal achieved. Continue to encourage appropriate mobility.     Disposition:   Home    Planned Readmission: no    Discharge Medications:  See after visit summary for reconciled discharge medications provided to patient and/or family.      Administrative Statements   Discharge Statement:  I have spent a total time of >30 minutes in caring for this patient on the day of the visit/encounter. >30 minutes of time was spent on: Instructions for  management, Patient and family education, Importance of tx compliance, Counseling / Coordination of care, Documenting in the medical record, and Communicating with other healthcare professionals .    **Please Note: This note may have been constructed using a voice recognition system**

## 2024-09-19 NOTE — ASSESSMENT & PLAN NOTE
Lab Results   Component Value Date    HGBA1C >15.0 (H) 07/08/2024       Recent Labs     09/18/24  1411 09/18/24  1601 09/18/24  2116 09/19/24  0750   POCGLU 265* 212* 218* 187*       Blood Sugar Average: Last 72 hrs:  (P) [id number]  Patient presented on 9/16 with 2 day history of malaise, poor appetite, nausea, multiple episodes of vomiting, abdominal pain, and several episodes of diarrhea.  Administers insulin 70/30 30 units BID. No mealtime bolus or sliding scale regimen due to financial difficulties.  POC glucose 300. Anion gap 21. VBG - pH 7.23, HC03 14. Beta hydroxybutyrate 9. UA with 4+ ketones  S/p Isolyte 2L and normal saline 1L boluses by EMS and ED  CT A/P - No acute intra-abdominal abnormality. Thickening of the distal esophagus likely related to esophagitis given history of vomiting.   Etiology: poor insulin compliance  Patient was initially managed in intensive care unit for diabetic ketoacidosis and maintained on DKA protocol insulin gtt. with IV hydration and electrolyte monitoring and replacement.  Patient anion gap had closed and was transitioned to non-DKA insulin gtt on 9/17.  Still reports nausea and poor oral intake.  Continue non-DKA insulin gtt. till patient able to eat consistently.  Continue with Accu-Cheks every 2 hourly.  Denies any episode of vomiting.  Advanced diet as tolerated.  Patient is tolerating diet well without nausea vomiting or worsening abdominal discomfort.  Transition to insulin 70/30 30 units twice daily.  Increase dose to 32 units twice daily upon discharge.  Recommended close outpatient follow-up with [facility] endocrinology and monitoring of blood sugars upon discharge.    Endocrine input appreciated

## 2024-09-19 NOTE — PROGRESS NOTES
The pantoprazole has been converted to Oral per [facility] IV-to-PO Auto-Conversion Protocol for Adults as approved by the Pharmacy and Therapeutics Committee. The patient met all eligible criteria:  1) Age = 18 years old   2) Received at least one dose of the IV form   3) Receiving at least one other scheduled oral/enteral medication   4) Tolerating an oral/enteral diet   and did not have any exclusions:   1) Critical care patient   2) Active GI bleed (IF assessing H2RAs or PPIs)   3) Continuous tube feeding (IF assessing cipro, doxycycline, levofloxacin, minocycline, rifampin, or voriconazole)   4) Receiving PO vancomycin (IF assessing metronidazole)   5) Persistent nausea and/or vomiting   6) Ileus or gastrointestinal obstruction   7) Cristal/nasogastric tube set for continuous suction   8) Specific order not to automatically convert to PO (in the order's comments or if discussed in the most recent Infectious Disease or primary team's progress notes).

## 2024-09-19 NOTE — ASSESSMENT & PLAN NOTE
Likely in the setting of gastritis.  Continue with PPI daily and trial of Carafate.  Continue with as needed antiemetics.  Recommended patient to avoid NSAID and gastric irritants.

## 2024-09-19 NOTE — ASSESSMENT & PLAN NOTE
Lab Results   Component Value Date    HGBA1C >15.0 (H) 07/08/2024       Recent Labs     09/18/24  1411 09/18/24  1601 09/18/24  2116 09/19/24  0750   POCGLU 265* 212* 218* 187*       Blood Sugar Average: Last 72 hrs:  (P) [id number]  Poor compliance with outpatient insulin with A1c greater than 15.  Endocrine input noted and appreciated.  Will need close outpatient endocrine follow-up on discharge.  Will be transition to 70/30 insulin twice daily upon discharge.  Insulin prescription sent to [facility] pharmacy for case management to provide upon discharge.

## 2024-10-09 ENCOUNTER — APPOINTMENT (EMERGENCY)
Dept: ULTRASOUND IMAGING | Facility: HOSPITAL | Age: 24
End: 2024-10-09

## 2024-10-09 ENCOUNTER — HOSPITAL ENCOUNTER (EMERGENCY)
Facility: HOSPITAL | Age: 24
Discharge: HOME | End: 2024-10-09
Attending: EMERGENCY MEDICINE

## 2024-10-09 VITALS
OXYGEN SATURATION: 100 % | HEART RATE: 95 BPM | SYSTOLIC BLOOD PRESSURE: 118 MMHG | BODY MASS INDEX: 19.54 KG/M2 | TEMPERATURE: 98.7 F | DIASTOLIC BLOOD PRESSURE: 77 MMHG | RESPIRATION RATE: 20 BRPM | WEIGHT: 121.03 LBS

## 2024-10-09 DIAGNOSIS — M54.9 BACK PAIN: Primary | ICD-10-CM

## 2024-10-09 DIAGNOSIS — E10.65 TYPE 1 DIABETES MELLITUS WITH HYPERGLYCEMIA (HCC): ICD-10-CM

## 2024-10-09 DIAGNOSIS — E11.10 DIABETIC KETOACIDOSIS (HCC): ICD-10-CM

## 2024-10-09 LAB
ALBUMIN SERPL BCG-MCNC: 4.9 G/DL (ref 3.5–5)
ALP SERPL-CCNC: 63 U/L (ref 34–104)
ALT SERPL W P-5'-P-CCNC: 9 U/L (ref 7–52)
ANION GAP SERPL CALCULATED.3IONS-SCNC: 6 MMOL/L (ref 4–13)
AST SERPL W P-5'-P-CCNC: 8 U/L (ref 13–39)
B-OH-BUTYR SERPL-MCNC: <0.05 MMOL/L (ref 0.02–0.27)
BASE EX.OXY STD BLDV CALC-SCNC: 72.5 % (ref 60–80)
BASE EXCESS BLDV CALC-SCNC: -0.2 MMOL/L
BASOPHILS # BLD AUTO: 0.03 THOUSANDS/ΜL (ref 0–0.1)
BASOPHILS NFR BLD AUTO: 1 % (ref 0–1)
BILIRUB SERPL-MCNC: 0.76 MG/DL (ref 0.2–1)
BUN SERPL-MCNC: 12 MG/DL (ref 5–25)
CALCIUM SERPL-MCNC: 10 MG/DL (ref 8.4–10.2)
CHLORIDE SERPL-SCNC: 99 MMOL/L (ref 96–108)
CO2 SERPL-SCNC: 32 MMOL/L (ref 21–32)
CREAT SERPL-MCNC: 0.72 MG/DL (ref 0.6–1.3)
EOSINOPHIL # BLD AUTO: 0.03 THOUSAND/ΜL (ref 0–0.61)
EOSINOPHIL NFR BLD AUTO: 1 % (ref 0–6)
ERYTHROCYTE [DISTWIDTH] IN BLOOD BY AUTOMATED COUNT: 11.8 % (ref 11.6–15.1)
GFR SERPL CREATININE-BSD FRML MDRD: 128 ML/MIN/1.73SQ M
GLUCOSE SERPL-MCNC: 268 MG/DL (ref 65–140)
HCO3 BLDV-SCNC: 25.7 MMOL/L (ref 24–30)
HCT VFR BLD AUTO: 46.2 % (ref 36.5–49.3)
HGB BLD-MCNC: 16.4 G/DL (ref 12–17)
IMM GRANULOCYTES # BLD AUTO: 0.01 THOUSAND/UL (ref 0–0.2)
IMM GRANULOCYTES NFR BLD AUTO: 0 % (ref 0–2)
LIPASE SERPL-CCNC: 17 U/L (ref 11–82)
LYMPHOCYTES # BLD AUTO: 1.52 THOUSANDS/ΜL (ref 0.6–4.47)
LYMPHOCYTES NFR BLD AUTO: 27 % (ref 14–44)
MCH RBC QN AUTO: 29.7 PG (ref 26.8–34.3)
MCHC RBC AUTO-ENTMCNC: 35.5 G/DL (ref 31.4–37.4)
MCV RBC AUTO: 84 FL (ref 82–98)
MONOCYTES # BLD AUTO: 0.54 THOUSAND/ΜL (ref 0.17–1.22)
MONOCYTES NFR BLD AUTO: 10 % (ref 4–12)
NEUTROPHILS # BLD AUTO: 3.51 THOUSANDS/ΜL (ref 1.85–7.62)
NEUTS SEG NFR BLD AUTO: 61 % (ref 43–75)
NRBC BLD AUTO-RTO: 0 /100 WBCS
O2 CT BLDV-SCNC: 15.1 ML/DL
PCO2 BLDV: 46.4 MM HG (ref 42–50)
PH BLDV: 7.36 [PH] (ref 7.3–7.4)
PLATELET # BLD AUTO: 329 THOUSANDS/UL (ref 149–390)
PMV BLD AUTO: 9.5 FL (ref 8.9–12.7)
PO2 BLDV: 40.1 MM HG (ref 35–45)
POTASSIUM SERPL-SCNC: 3.8 MMOL/L (ref 3.5–5.3)
PROT SERPL-MCNC: 7.7 G/DL (ref 6.4–8.4)
RBC # BLD AUTO: 5.52 MILLION/UL (ref 3.88–5.62)
SODIUM SERPL-SCNC: 137 MMOL/L (ref 135–147)
WBC # BLD AUTO: 5.64 THOUSAND/UL (ref 4.31–10.16)

## 2024-10-09 PROCEDURE — 96361 HYDRATE IV INFUSION ADD-ON: CPT

## 2024-10-09 PROCEDURE — 76870 US EXAM SCROTUM: CPT

## 2024-10-09 PROCEDURE — 82805 BLOOD GASES W/O2 SATURATION: CPT | Performed by: EMERGENCY MEDICINE

## 2024-10-09 PROCEDURE — 99285 EMERGENCY DEPT VISIT HI MDM: CPT | Performed by: EMERGENCY MEDICINE

## 2024-10-09 PROCEDURE — 96365 THER/PROPH/DIAG IV INF INIT: CPT

## 2024-10-09 PROCEDURE — 96375 TX/PRO/DX INJ NEW DRUG ADDON: CPT

## 2024-10-09 PROCEDURE — 85025 COMPLETE CBC W/AUTO DIFF WBC: CPT | Performed by: EMERGENCY MEDICINE

## 2024-10-09 PROCEDURE — 80053 COMPREHEN METABOLIC PANEL: CPT | Performed by: EMERGENCY MEDICINE

## 2024-10-09 PROCEDURE — 83690 ASSAY OF LIPASE: CPT | Performed by: EMERGENCY MEDICINE

## 2024-10-09 PROCEDURE — 99285 EMERGENCY DEPT VISIT HI MDM: CPT

## 2024-10-09 PROCEDURE — 36415 COLL VENOUS BLD VENIPUNCTURE: CPT

## 2024-10-09 PROCEDURE — 82010 KETONE BODYS QUAN: CPT | Performed by: EMERGENCY MEDICINE

## 2024-10-09 RX ORDER — INSULIN HUMAN 100 [IU]/ML
32 INJECTION, SUSPENSION SUBCUTANEOUS
Qty: 15 ML | Refills: 0 | Status: SHIPPED | OUTPATIENT
Start: 2024-10-09 | End: 2024-11-08

## 2024-10-09 RX ORDER — PEN NEEDLE, DIABETIC 32GX 5/32"
NEEDLE, DISPOSABLE MISCELLANEOUS
Qty: 100 EACH | Refills: 0 | Status: SHIPPED | OUTPATIENT
Start: 2024-10-09

## 2024-10-09 RX ORDER — ACETAMINOPHEN 10 MG/ML
1000 INJECTION, SOLUTION INTRAVENOUS ONCE
Status: COMPLETED | OUTPATIENT
Start: 2024-10-09 | End: 2024-10-09

## 2024-10-09 RX ORDER — BLOOD SUGAR DIAGNOSTIC
STRIP MISCELLANEOUS
Qty: 100 EACH | Refills: 0 | Status: SHIPPED | OUTPATIENT
Start: 2024-10-09

## 2024-10-09 RX ORDER — KETOROLAC TROMETHAMINE 30 MG/ML
15 INJECTION, SOLUTION INTRAMUSCULAR; INTRAVENOUS ONCE
Status: COMPLETED | OUTPATIENT
Start: 2024-10-09 | End: 2024-10-09

## 2024-10-09 RX ORDER — LANCETS 33 GAUGE
EACH MISCELLANEOUS
Qty: 100 EACH | Refills: 0 | Status: SHIPPED | OUTPATIENT
Start: 2024-10-09

## 2024-10-09 RX ORDER — NAPROXEN 500 MG/1
500 TABLET ORAL 2 TIMES DAILY WITH MEALS
Qty: 20 TABLET | Refills: 0 | Status: SHIPPED | OUTPATIENT
Start: 2024-10-09 | End: 2024-10-19

## 2024-10-09 RX ORDER — GLUCOSAMINE HCL/CHONDROITIN SU 500-400 MG
CAPSULE ORAL
Qty: 100 EACH | Refills: 0 | Status: SHIPPED | OUTPATIENT
Start: 2024-10-09

## 2024-10-09 RX ADMIN — KETOROLAC TROMETHAMINE 15 MG: 30 INJECTION, SOLUTION INTRAMUSCULAR; INTRAVENOUS at 14:11

## 2024-10-09 RX ADMIN — SODIUM CHLORIDE 1000 ML: 0.9 INJECTION, SOLUTION INTRAVENOUS at 13:43

## 2024-10-09 RX ADMIN — ACETAMINOPHEN 1000 MG: 10 INJECTION INTRAVENOUS at 15:25

## 2024-10-09 NOTE — ED PROVIDER NOTES
"Final diagnoses:   Back pain     ED Disposition       ED Disposition   Discharge    Condition   Stable    Date/Time   Wed Oct 9, 2024  3:53 PM    Comment   Sagar Acuna discharge to home/self care.                   Assessment & Plan       Medical Decision Making  1. Elevated blood glucose - Looking through records, patient's A1C appears too be consistently elevated. Will check blood glucose, CBC, metabolic panel for electrolyte abnormalities and dehydration and anion gap, VBG, beta hydroxybutyrate. Will give fluids.    2. Testicular pain - Patient with similar in the past, will order ultrasound to rule out orchitis/torsion, instruct follow up with urology. Will give NSAIDs for this and for back pain.    Problems Addressed:  Back pain: chronic illness or injury with exacerbation, progression, or side effects of treatment    Amount and/or Complexity of Data Reviewed  External Data Reviewed: labs and notes.  Labs: ordered. Decision-making details documented in ED Course.  Radiology: ordered.    Risk  OTC drugs.  Prescription drug management.        ED Course as of 10/09/24 2049   Wed Oct 09, 2024   1600 ANION GAP: 6       Medications   sodium chloride 0.9 % bolus 1,000 mL (0 mL Intravenous Stopped 10/9/24 1514)   ketorolac (TORADOL) injection 15 mg (15 mg Intravenous Given 10/9/24 1411)   acetaminophen (Ofirmev) injection 1,000 mg (0 mg Intravenous Stopped 10/9/24 1622)       ED Risk Strat Scores                                               History of Present Illness       Chief Complaint   Patient presents with    Back Pain     Pt reports back pain, knee pain and testicle pain x1 week and now \"cannot sit down\". He is a diabetic and also reports he is having blurred vision and high blood sugars. However, states this is not what is bringing him into the hospital. Blood sugar this morning was 210 but ran out of his insulin last night.    Hyperglycemia - Symptomatic       Past Medical History:   Diagnosis Date "    Diabetes mellitus (HCC)     Glaucoma     Subaortic membrane       Past Surgical History:   Procedure Laterality Date    CARDIAC SURGERY        Family History   Problem Relation Age of Onset    Migraines Mother       Social History     Tobacco Use    Smoking status: Former     Current packs/day: 0.00     Types: Cigarettes     Quit date:      Years since quittin.7    Smokeless tobacco: Never    Tobacco comments:     4 years smoker 13 to 17 years old, 2020 quit   Vaping Use    Vaping status: Never Used   Substance Use Topics    Alcohol use: Yes     Comment: socially    Drug use: Not Currently      E-Cigarette/Vaping    E-Cigarette Use Never User       E-Cigarette/Vaping Substances    Nicotine No     THC No     CBD No     Flavoring No     Other No     Unknown No       I have reviewed and agree with the history as documented.     25 YO male presents with elevated blood glucose, back, testicle and knee pain. Patient has a history of type 1 diabetes, has had admissions for DKA in the last month. He states he has run out of his insulin, sugars have been running in the 200's. States this is similar to previous. He additionally notes back pain that has been present for the last week, constant, worse with movement. This has been associated with bilateral testicle pain that radiating down into the inner thigh. Patient has had this in the past, he has been seen few times in the ED for it, he has not followed up with a urologist as instructed, stating he lost the paperwork with information. He states knee pain is different from previous but this is similar to previous back and testicular pain. He has been diagnosed with orchitis previously. Pt denies CP/SOB/F/C/N/V/D/C, no dysuria, burning on urination or blood in urine.       History provided by:  Patient   used: No    Hyperglycemia - Symptomatic  Associated symptoms: no abdominal pain, no chest pain, no confusion, no dizziness, no dysuria, no  fever, no nausea, no shortness of breath, no vomiting and no weakness        Review of Systems   Constitutional:  Negative for fever.   HENT:  Negative for dental problem.    Eyes:  Negative for visual disturbance.   Respiratory:  Negative for shortness of breath.    Cardiovascular:  Negative for chest pain.   Gastrointestinal:  Negative for abdominal pain, nausea and vomiting.   Genitourinary:  Positive for testicular pain. Negative for dysuria and frequency.   Musculoskeletal:  Positive for back pain. Negative for neck pain and neck stiffness.   Skin:  Negative for rash.   Neurological:  Negative for dizziness, weakness and light-headedness.   Psychiatric/Behavioral:  Negative for agitation, behavioral problems and confusion.    All other systems reviewed and are negative.          Objective       ED Triage Vitals [10/09/24 1247]   Temperature Pulse Blood Pressure Respirations SpO2 Patient Position - Orthostatic VS   98.7 °F (37.1 °C) 101 128/83 20 100 % Sitting      Temp Source Heart Rate Source BP Location FiO2 (%) Pain Score    Oral Monitor Right arm -- 6      Vitals      Date and Time Temp Pulse SpO2 Resp BP Pain Score FACES Pain Rating User   10/09/24 1622 -- 95 100 % 20 118/77 7 -- AEN   10/09/24 1515 -- 98 100 % 20 124/82 -- -- TREVOR   10/09/24 1411 -- -- -- -- -- 10 - Worst Possible Pain -- AEN   10/09/24 1247 98.7 °F (37.1 °C) 101 100 % 20 128/83 6 -- AF            Physical Exam  Vitals and nursing note reviewed.   Constitutional:       Appearance: He is well-developed.   HENT:      Head: Normocephalic and atraumatic.   Eyes:      Extraocular Movements: Extraocular movements intact.   Cardiovascular:      Rate and Rhythm: Normal rate.   Pulmonary:      Effort: Pulmonary effort is normal.   Abdominal:      General: There is no distension.   Musculoskeletal:         General: Normal range of motion.      Cervical back: Normal range of motion.   Skin:     Findings: No rash.   Neurological:      Mental Status: He  is alert and oriented to person, place, and time.   Psychiatric:         Behavior: Behavior normal.         Results Reviewed       Procedure Component Value Units Date/Time    Beta Hydroxybutyrate [[id number]]  (Normal) Collected: 10/09/24 1412    Lab Status: Final result Specimen: Blood from Arm, Right Updated: 10/09/24 1442     Beta- Hydroxybutyrate <0.05 mmol/L     Lipase [[id number]]  (Normal) Collected: 10/09/24 1412    Lab Status: Final result Specimen: Blood from Arm, Right Updated: 10/09/24 1442     Lipase 17 u/L     Blood gas, venous [[id number]] Collected: 10/09/24 1412    Lab Status: Final result Specimen: Blood from Arm, Right Updated: 10/09/24 1428     pH, Oli 7.361     pCO2, Oli 46.4 mm Hg      pO2, Oli 40.1 mm Hg      HCO3, Oli 25.7 mmol/L      Base Excess, Oli -0.2 mmol/L      O2 Content, Oli 15.1 ml/dL      O2 HGB, VENOUS 72.5 %     Comprehensive metabolic panel [[id number]]  (Abnormal) Collected: 10/09/24 1341    Lab Status: Final result Specimen: Blood from Arm, Right Updated: 10/09/24 1413     Sodium 137 mmol/L      Potassium 3.8 mmol/L      Chloride 99 mmol/L      CO2 32 mmol/L      ANION GAP 6 mmol/L      BUN 12 mg/dL      Creatinine 0.72 mg/dL      Glucose 268 mg/dL      Calcium 10.0 mg/dL      AST 8 U/L      ALT 9 U/L      Alkaline Phosphatase 63 U/L      Total Protein 7.7 g/dL      Albumin 4.9 g/dL      Total Bilirubin 0.76 mg/dL      eGFR 128 ml/min/1.73sq m     Narrative:      [employer] guidelines for Chronic Kidney Disease (CKD):     Stage 1 with normal or high GFR (GFR > 90 mL/min/1.73 square meters)    Stage 2 Mild CKD (GFR = 60-89 mL/min/1.73 square meters)    Stage 3A Moderate CKD (GFR = 45-59 mL/min/1.73 square meters)    Stage 3B Moderate CKD (GFR = 30-44 mL/min/1.73 square meters)    Stage 4 Severe CKD (GFR = 15-29 mL/min/1.73 square meters)    Stage 5 End Stage CKD (GFR <15 mL/min/1.73 square meters)  Note: GFR calculation is accurate only with a steady state  creatinine    CBC and differential [[id number]] Collected: 10/09/24 1341    Lab Status: Final result Specimen: Blood from Arm, Right Updated: 10/09/24 1355     WBC 5.64 Thousand/uL      RBC 5.52 Million/uL      Hemoglobin 16.4 g/dL      Hematocrit 46.2 %      MCV 84 fL      MCH 29.7 pg      MCHC 35.5 g/dL      RDW 11.8 %      MPV 9.5 fL      Platelets 329 Thousands/uL      nRBC 0 /100 WBCs      Segmented % 61 %      Immature Grans % 0 %      Lymphocytes % 27 %      Monocytes % 10 %      Eosinophils Relative 1 %      Basophils Relative 1 %      Absolute Neutrophils 3.51 Thousands/µL      Absolute Immature Grans 0.01 Thousand/uL      Absolute Lymphocytes 1.52 Thousands/µL      Absolute Monocytes 0.54 Thousand/µL      Eosinophils Absolute 0.03 Thousand/µL      Basophils Absolute 0.03 Thousands/µL             US scrotum and testicles   Final Interpretation by Mahamed Jiménez MD (10/09 1538)      No identifiable acute abnormality to account for the patient's clinical presentation.      Workstation performed: [id number]             Procedures    ED Medication and Procedure Management   Prior to Admission Medications   Prescriptions Last Dose Informant Patient Reported? Taking?   Alcohol Swabs 70 % PADS   No No   Sig: May substitute brand based on insurance coverage. Check glucose TID.   Alcohol Swabs 70 % PADS   No Yes   Sig: May substitute brand based on insurance coverage. Check glucose TID.   Blood Glucose Monitoring Suppl (OneTouch Verio Reflect) w/Device KIT   No No   Sig: May substitute brand based on insurance coverage. Check glucose TID.   Insulin Pen Needle (BD Pen Needle Shelly 2nd Gen) 32G X 4 MM MISC   No No   Sig: For use with insulin pen. Pharmacy may dispense brand covered by insurance.   Insulin Pen Needle (BD Pen Needle Shelly 2nd Gen) 32G X 4 MM MISC   No No   Sig: For use with insulin pen. Pharmacy may dispense brand covered by insurance.   Insulin Pen Needle (BD Pen Needle Shelly 2nd Gen) 32G X 4 MM MISC    No No   Sig: For use with insulin pen. Pharmacy may dispense brand covered by insurance.   Insulin Pen Needle (BD Pen Needle Shelly 2nd Gen) 32G X 4 MM MISC   No Yes   Sig: For use with insulin pen. Pharmacy may dispense brand covered by insurance.   OneTouch Delica Lancets 33G MISC   No No   Sig: May substitute brand based on insurance coverage. Check glucose TID.   OneTouch Delica Lancets 33G MISC   No Yes   Sig: May substitute brand based on insurance coverage. Check glucose TID.   acetaminophen (TYLENOL) 325 mg tablet   No No   Sig: Take 2 tablets (650 mg total) by mouth every 6 (six) hours as needed for mild pain, headaches or fever   glucose blood (OneTouch Verio) test strip   No No   Sig: May substitute brand based on insurance coverage. Check glucose TID.   glucose blood (OneTouch Verio) test strip   No Yes   Sig: May substitute brand based on insurance coverage. Check glucose TID.   insulin isophane-insulin regular (HumuLIN 70/30 KwikPen) 100 units/mL injection pen   No No   Sig: Inject 32 Units under the skin 2 (two) times a day before meals   insulin isophane-insulin regular (HumuLIN 70/30 KwikPen) 100 units/mL injection pen   No Yes   Sig: Inject 32 Units under the skin 2 (two) times a day before meals   ondansetron (ZOFRAN) 4 mg tablet   No No   Sig: Take 1 tablet (4 mg total) by mouth every 8 (eight) hours as needed for nausea or vomiting   pantoprazole (PROTONIX) 40 mg tablet   No No   Sig: Take 1 tablet (40 mg total) by mouth daily   sucralfate (CARAFATE) 1 g tablet   No No   Sig: Take 1 tablet (1 g total) by mouth 4 (four) times a day for 10 days      Facility-Administered Medications: None     Discharge Medication List as of 10/9/2024  4:00 PM        START taking these medications    Details   naproxen (NAPROSYN) 500 mg tablet Take 1 tablet (500 mg total) by mouth 2 (two) times a day with meals for 10 days, Starting Wed 10/9/2024, Until Sat 10/19/2024, Normal           CONTINUE these medications  which have CHANGED    Details   Alcohol Swabs 70 % PADS May substitute brand based on insurance coverage. Check glucose TID., Normal      glucose blood (OneTouch Verio) test strip May substitute brand based on insurance coverage. Check glucose TID., Normal      insulin isophane-insulin regular (HumuLIN 70/30 KwikPen) 100 units/mL injection pen Inject 32 Units under the skin 2 (two) times a day before meals, Starting Wed 10/9/2024, Until Fri 11/8/2024, Normal      !! Insulin Pen Needle (BD Pen Needle Shelly 2nd Gen) 32G X 4 MM MISC For use with insulin pen. Pharmacy may dispense brand covered by insurance., Normal      OneTouch Delica Lancets 33G MISC May substitute brand based on insurance coverage. Check glucose TID., Normal       !! - Potential duplicate medications found. Please discuss with provider.        CONTINUE these medications which have NOT CHANGED    Details   acetaminophen (TYLENOL) 325 mg tablet Take 2 tablets (650 mg total) by mouth every 6 (six) hours as needed for mild pain, headaches or fever, Starting Thu 9/19/2024, Normal      Blood Glucose Monitoring Suppl (OneTouch Verio Reflect) w/Device KIT May substitute brand based on insurance coverage. Check glucose TID., Normal      !! Insulin Pen Needle (BD Pen Needle Shelly 2nd Gen) 32G X 4 MM MISC For use with insulin pen. Pharmacy may dispense brand covered by insurance., Normal      !! Insulin Pen Needle (BD Pen Needle Shelly 2nd Gen) 32G X 4 MM MISC For use with insulin pen. Pharmacy may dispense brand covered by insurance., Normal      ondansetron (ZOFRAN) 4 mg tablet Take 1 tablet (4 mg total) by mouth every 8 (eight) hours as needed for nausea or vomiting, Starting Th 9/19/2024, Normal      pantoprazole (PROTONIX) 40 mg tablet Take 1 tablet (40 mg total) by mouth daily, Starting Thu 9/19/2024, Normal      sucralfate (CARAFATE) 1 g tablet Take 1 tablet (1 g total) by mouth 4 (four) times a day for 10 days, Starting Thu 9/19/2024, Until Sun 9/29/2024,  Normal       !! - Potential duplicate medications found. Please discuss with provider.          ED SEPSIS DOCUMENTATION   Time reflects when diagnosis was documented in both MDM as applicable and the Disposition within this note       Time User Action Codes Description Comment    10/9/2024  3:53 PM Adria Solorzano [M54.9] Back pain     10/9/2024  3:54 PM Adria Solorzano [E11.10] Diabetic ketoacidosis (HCC)     10/9/2024  3:54 PM Adria Solorzano [E10.65] Type 1 diabetes mellitus with hyperglycemia (HCC)                  Adria Solorzano MD  10/09/24 2049

## 2024-10-09 NOTE — DISCHARGE INSTRUCTIONS
Take the Naprosyn twice daily for the next 5-10 days.    Start taking your insulin as previously prescribed.    An order for the [facility] has been placed, they should be in contact with you. If you have not heard from them in 48 hours, call the number included in these discharge instructions.    The number for urology is included in these discharge instructions, call to be seen in the office for further evaluation and management.     Bonnie Brae Naprosyn dos veces al día pedrito los próximos 5 a 10 días.     Comience a mery ch insulina según lo prescrito previamente.     Se ha realizado un pedido para el [facility] [facility], deberían estar en contacto con usted.     Si no ha tenido noticias de ellos en 48 horas, llame al número incluido en estas instrucciones de jenae.     El número de urología está incluido en estas instrucciones de jenae, llame para que lo atiendan en el consultorio para bill evaluación y un tratamiento adicionales.

## 2024-10-10 ENCOUNTER — TELEPHONE (OUTPATIENT)
Dept: PHYSICAL THERAPY | Facility: OTHER | Age: 24
End: 2024-10-10

## 2024-10-10 NOTE — TELEPHONE ENCOUNTER
"Nurse reached out to discuss recent referral entered for  [facility].    Message stated, \"The subscriber you have dialed is no longer in service..\" with both attempts made today.   Unable to reach or LM for the patient.    Referral deferred for a f/u attempt per protocol. This is the first attempt to reach the patient.  "

## 2024-10-15 NOTE — TELEPHONE ENCOUNTER
"Nurse reached out to discuss recent referral entered for  [facility].    Message stated, \"The subscriber you have dialed is no longer in service..\"   Unable to reach or LM for the patient.     Referral deferred for a f/u attempt per protocol. This is the second attempt to reach the patient.  "

## 2024-10-18 PROBLEM — E10.10 DIABETIC KETOACIDOSIS ASSOCIATED WITH TYPE 1 DIABETES MELLITUS (HCC): Status: RESOLVED | Noted: 2024-09-16 | Resolved: 2024-10-18

## 2025-05-31 ENCOUNTER — HOSPITAL ENCOUNTER (EMERGENCY)
Facility: HOSPITAL | Age: 25
Discharge: HOME | End: 2025-05-31
Attending: EMERGENCY MEDICINE | Admitting: EMERGENCY MEDICINE

## 2025-05-31 VITALS
SYSTOLIC BLOOD PRESSURE: 100 MMHG | DIASTOLIC BLOOD PRESSURE: 62 MMHG | TEMPERATURE: 98.1 F | HEART RATE: 91 BPM | RESPIRATION RATE: 20 BRPM | OXYGEN SATURATION: 100 %

## 2025-05-31 DIAGNOSIS — G89.29 CHRONIC PAIN: Primary | ICD-10-CM

## 2025-05-31 DIAGNOSIS — R11.0 NAUSEA: ICD-10-CM

## 2025-05-31 LAB — GLUCOSE SERPL-MCNC: 263 MG/DL (ref 65–140)

## 2025-05-31 PROCEDURE — 99284 EMERGENCY DEPT VISIT MOD MDM: CPT | Performed by: EMERGENCY MEDICINE

## 2025-05-31 PROCEDURE — 99284 EMERGENCY DEPT VISIT MOD MDM: CPT

## 2025-05-31 PROCEDURE — 82948 REAGENT STRIP/BLOOD GLUCOSE: CPT

## 2025-05-31 RX ORDER — OXYCODONE AND ACETAMINOPHEN 5; 325 MG/1; MG/1
1 TABLET ORAL ONCE
Refills: 0 | Status: COMPLETED | OUTPATIENT
Start: 2025-05-31 | End: 2025-05-31

## 2025-05-31 RX ORDER — ONDANSETRON 4 MG/1
4 TABLET, ORALLY DISINTEGRATING ORAL ONCE
Status: COMPLETED | OUTPATIENT
Start: 2025-05-31 | End: 2025-05-31

## 2025-05-31 RX ADMIN — OXYCODONE HYDROCHLORIDE AND ACETAMINOPHEN 1 TABLET: 5; 325 TABLET ORAL at 21:29

## 2025-05-31 RX ADMIN — ONDANSETRON 4 MG: 4 TABLET, ORALLY DISINTEGRATING ORAL at 21:30

## 2025-06-01 NOTE — ED PROVIDER NOTES
"Time reflects when diagnosis was documented in both MDM as applicable and the Disposition within this note       Time User Action Codes Description Comment    5/31/2025  9:17 PM Jair Lehman [G89.29] Chronic pain     5/31/2025  9:17 PM Jair Lehman [R11.0] Nausea           ED Disposition       ED Disposition   Discharge    Condition   Stable    Date/Time   Sat May 31, 2025  9:17 PM    Comment   Sagar Acuna discharge to home/self care.                   Assessment & Plan       Medical Decision Making  Patient with h/o DM.  But more concerned about his chronic pain in knees.  No new trauma.  No numbness/tingling.  Can give dose of meds here but unable to prescribe long term meds due to [facility] policy.  Patient can follow up with PCP in [place] on Monday.     Amount and/or Complexity of Data Reviewed  External Data Reviewed: notes.    Risk  Prescription drug management.        ED Course as of 06/01/25 1523   Sat May 31, 2025   2117 Office visit in April of this year:  ASSESSMENT / PLAN:  DM type 1  Hyperglycemia  -poc glucose 597  -poc a1c 14  -Patient with uncontrolled diabetes. History of multiple admissions due to DKA. Explained to patient importance of evaluation of possible DKA, getting diabetes under control. Patent refuses. States \"they won't do anything for me there. I can do it by myself at home. I have insulin at home and I will do it by myself.\" Explained to patient risk of diabetic coma that may lead to death. Patient continued to refuse and left clinic.            Medications   oxyCODONE-acetaminophen (PERCOCET) 5-325 mg per tablet 1 tablet (1 tablet Oral Given 5/31/25 2129)   ondansetron (ZOFRAN-ODT) dispersible tablet 4 mg (4 mg Oral Given 5/31/25 2130)       ED Risk Strat Scores                    No data recorded                            History of Present Illness       Chief Complaint   Patient presents with    Abdominal Pain     Pt reports being out of insulin for 4-5 days and not " checking sugars.  States he does not have money for either.  Also has abdominal pain, nausea, vomiting, and diarrhea for unknown amount of time.  Reports bilateral upper leg/knee pain as well.  No meds PTA.        Past Medical History[1]   Past Surgical History[2]   Family History[3]   Social History[4]   E-Cigarette/Vaping    E-Cigarette Use Never User       E-Cigarette/Vaping Substances    Nicotine No     THC No     CBD No     Flavoring No     Other No     Unknown No       I have reviewed and agree with the history as documented.     Patient is a 25-year-old male with a h/o Dm and chronic leg pain presents via [facility] with an exacerbation of chronic pain to b/l knees.  Ongoing issue.   has been on neurontin, percocet and tramadol.  Was told to smoke marijuana but doesn't want to be addicted to drugs.  No new trauma.   also been out of his insulin for the last 5 days.  Here visiting from [place].  Planning on going back this Monday.  +nausea.  Accucheck here 263.  Here his focus is only on his chronic pain.          Review of Systems   Constitutional:  Positive for appetite change. Negative for chills and fever.   HENT:  Negative for ear pain and sore throat.    Eyes:  Negative for pain and visual disturbance.   Respiratory:  Negative for cough and shortness of breath.    Cardiovascular:  Negative for chest pain and palpitations.   Gastrointestinal:  Positive for nausea. Negative for abdominal pain and vomiting.   Genitourinary:  Negative for dysuria and hematuria.   Musculoskeletal:  Positive for arthralgias. Negative for back pain.   Skin:  Negative for color change and rash.   Neurological:  Negative for seizures and syncope.   All other systems reviewed and are negative.          Objective       ED Triage Vitals   Temperature Pulse Blood Pressure Respirations SpO2 Patient Position - Orthostatic VS   05/31/25 2102 05/31/25 2102 05/31/25 2102 05/31/25 2102 05/31/25 2102 05/31/25 2102   98.1 °F (36.7 °C) 91  100/62 20 100 % Sitting      Temp Source Heart Rate Source BP Location FiO2 (%) Pain Score    05/31/25 2102 05/31/25 2102 05/31/25 2102 -- 05/31/25 2129    Oral Monitor Left arm  10 - Worst Possible Pain      Vitals      Date and Time Temp Pulse SpO2 Resp BP Pain Score FACES Pain Rating User   05/31/25 2129 -- -- -- -- -- 10 - Worst Possible Pain -- SD   05/31/25 2102 98.1 °F (36.7 °C) 91 100 % 20 100/62 -- -- RG            Physical Exam  Vitals and nursing note reviewed.   HENT:      Head: Normocephalic and atraumatic.      Nose: Nose normal.      Mouth/Throat:      Mouth: Mucous membranes are moist.      Pharynx: Oropharynx is clear.     Cardiovascular:      Rate and Rhythm: Normal rate and regular rhythm.   Pulmonary:      Effort: Pulmonary effort is normal.      Breath sounds: Normal breath sounds.   Abdominal:      General: Abdomen is flat. Bowel sounds are normal.      Palpations: Abdomen is soft.      Tenderness: There is no abdominal tenderness. There is no guarding or rebound. Negative signs include Noland's sign.   Genitourinary:     Rectum: Normal.     Musculoskeletal:      Comments: Bony ttp to b/l knees.  No deformity.  No swelling.  No hip or ankle pain.      Skin:     General: Skin is warm and dry.      Capillary Refill: Capillary refill takes less than 2 seconds.     Neurological:      General: No focal deficit present.      Mental Status: He is alert and oriented to person, place, and time.         Results Reviewed       Procedure Component Value Units Date/Time    Fingerstick Glucose (POCT) [[id number]]  (Abnormal) Collected: 05/31/25 2101    Lab Status: Final result Specimen: Blood Updated: 05/31/25 2102     POC Glucose 263 mg/dl             No orders to display       Procedures    ED Medication and Procedure Management   Prior to Admission Medications   Prescriptions Last Dose Informant Patient Reported? Taking?   Alcohol Swabs 70 % PADS   No No   Sig: May substitute brand based on insurance  coverage. Check glucose TID.   Blood Glucose Monitoring Suppl (OneTouch Verio Reflect) w/Device KIT   No No   Sig: May substitute brand based on insurance coverage. Check glucose TID.   Insulin Pen Needle (BD Pen Needle Shelly 2nd Gen) 32G X 4 MM MISC   No No   Sig: For use with insulin pen. Pharmacy may dispense brand covered by insurance.   Insulin Pen Needle (BD Pen Needle Shelly 2nd Gen) 32G X 4 MM MISC   No No   Sig: For use with insulin pen. Pharmacy may dispense brand covered by insurance.   Insulin Pen Needle (BD Pen Needle Shelly 2nd Gen) 32G X 4 MM MISC   No No   Sig: For use with insulin pen. Pharmacy may dispense brand covered by insurance.   OneTouch Delica Lancets 33G MISC   No No   Sig: May substitute brand based on insurance coverage. Check glucose TID.   acetaminophen (TYLENOL) 325 mg tablet   No No   Sig: Take 2 tablets (650 mg total) by mouth every 6 (six) hours as needed for mild pain, headaches or fever   glucose blood (OneTouch Verio) test strip   No No   Sig: May substitute brand based on insurance coverage. Check glucose TID.   insulin isophane-insulin regular (HumuLIN 70/30 KwikPen) 100 units/mL injection pen   No No   Sig: Inject 32 Units under the skin 2 (two) times a day before meals   naproxen (NAPROSYN) 500 mg tablet   No No   Sig: Take 1 tablet (500 mg total) by mouth 2 (two) times a day with meals for 10 days   ondansetron (ZOFRAN) 4 mg tablet   No No   Sig: Take 1 tablet (4 mg total) by mouth every 8 (eight) hours as needed for nausea or vomiting   pantoprazole (PROTONIX) 40 mg tablet   No No   Sig: Take 1 tablet (40 mg total) by mouth daily   sucralfate (CARAFATE) 1 g tablet   No No   Sig: Take 1 tablet (1 g total) by mouth 4 (four) times a day for 10 days      Facility-Administered Medications: None     Discharge Medication List as of 5/31/2025  9:18 PM        CONTINUE these medications which have NOT CHANGED    Details   acetaminophen (TYLENOL) 325 mg tablet Take 2 tablets (650 mg  total) by mouth every 6 (six) hours as needed for mild pain, headaches or fever, Starting Thu 9/19/2024, Normal      Alcohol Swabs 70 % PADS May substitute brand based on insurance coverage. Check glucose TID., Normal      Blood Glucose Monitoring Suppl (OneTouch Verio Reflect) w/Device KIT May substitute brand based on insurance coverage. Check glucose TID., Normal      glucose blood (OneTouch Verio) test strip May substitute brand based on insurance coverage. Check glucose TID., Normal      insulin isophane-insulin regular (HumuLIN 70/30 KwikPen) 100 units/mL injection pen Inject 32 Units under the skin 2 (two) times a day before meals, Starting Wed 10/9/2024, Until Fri 11/8/2024, Normal      !! Insulin Pen Needle (BD Pen Needle Shelly 2nd Gen) 32G X 4 MM MISC For use with insulin pen. Pharmacy may dispense brand covered by insurance., Normal      !! Insulin Pen Needle (BD Pen Needle Shelly 2nd Gen) 32G X 4 MM MISC For use with insulin pen. Pharmacy may dispense brand covered by insurance., Normal      !! Insulin Pen Needle (BD Pen Needle Shelly 2nd Gen) 32G X 4 MM MISC For use with insulin pen. Pharmacy may dispense brand covered by insurance., Normal      naproxen (NAPROSYN) 500 mg tablet Take 1 tablet (500 mg total) by mouth 2 (two) times a day with meals for 10 days, Starting Wed 10/9/2024, Until Sat 10/19/2024, Normal      ondansetron (ZOFRAN) 4 mg tablet Take 1 tablet (4 mg total) by mouth every 8 (eight) hours as needed for nausea or vomiting, Starting Thu 9/19/2024, Normal      OneTouch Delica Lancets 33G MISC May substitute brand based on insurance coverage. Check glucose TID., Normal      pantoprazole (PROTONIX) 40 mg tablet Take 1 tablet (40 mg total) by mouth daily, Starting Thu 9/19/2024, Normal      sucralfate (CARAFATE) 1 g tablet Take 1 tablet (1 g total) by mouth 4 (four) times a day for 10 days, Starting Thu 9/19/2024, Until Sun 9/29/2024, Normal       !! - Potential duplicate medications found. Please  discuss with provider.        No discharge procedures on file.  ED SEPSIS DOCUMENTATION   Time reflects when diagnosis was documented in both MDM as applicable and the Disposition within this note       Time User Action Codes Description Comment    2025  9:17 PM Jair Lehman [G89.29] Chronic pain     2025  9:17 PM Jair Lehman [R11.0] Nausea                      [1]   Past Medical History:  Diagnosis Date    Diabetes mellitus (HCC)     Glaucoma     Subaortic membrane    [2]   Past Surgical History:  Procedure Laterality Date    CARDIAC SURGERY     [3]   Family History  Problem Relation Name Age of Onset    Migraines Mother     [4]   Social History  Tobacco Use    Smoking status: Former     Current packs/day: 0.00     Types: Cigarettes     Quit date: 2018     Years since quittin.4    Smokeless tobacco: Never    Tobacco comments:     4 years smoker 13 to 17 years old, 2020 quit   Vaping Use    Vaping status: Never Used   Substance Use Topics    Alcohol use: Yes     Comment: socially    Drug use: Not Currently        Jair Lehman MD  25 [phone]

## 2025-06-04 ENCOUNTER — HOSPITAL ENCOUNTER (EMERGENCY)
Facility: HOSPITAL | Age: 25
Discharge: HOME | End: 2025-06-04
Attending: EMERGENCY MEDICINE

## 2025-06-04 VITALS
DIASTOLIC BLOOD PRESSURE: 71 MMHG | BODY MASS INDEX: 17.25 KG/M2 | RESPIRATION RATE: 18 BRPM | HEIGHT: 66 IN | TEMPERATURE: 98.7 F | WEIGHT: 107.36 LBS | SYSTOLIC BLOOD PRESSURE: 132 MMHG | OXYGEN SATURATION: 99 % | HEART RATE: 76 BPM

## 2025-06-04 DIAGNOSIS — Z53.29 LEFT AGAINST MEDICAL ADVICE: ICD-10-CM

## 2025-06-04 DIAGNOSIS — M54.9 CHRONIC BACK PAIN: ICD-10-CM

## 2025-06-04 DIAGNOSIS — E11.10 DKA (DIABETIC KETOACIDOSIS) (HCC): Primary | ICD-10-CM

## 2025-06-04 DIAGNOSIS — G89.29 CHRONIC BACK PAIN: ICD-10-CM

## 2025-06-04 DIAGNOSIS — Z91.199 NONCOMPLIANCE WITH DIET AND MEDICATION REGIMEN: ICD-10-CM

## 2025-06-04 DIAGNOSIS — Z91.148 NONCOMPLIANCE WITH DIET AND MEDICATION REGIMEN: ICD-10-CM

## 2025-06-04 LAB
ALBUMIN SERPL BCG-MCNC: 4.9 G/DL (ref 3.5–5)
ALP SERPL-CCNC: 115 U/L (ref 34–104)
ALT SERPL W P-5'-P-CCNC: 7 U/L (ref 7–52)
ANION GAP SERPL CALCULATED.3IONS-SCNC: 17 MMOL/L (ref 4–13)
AST SERPL W P-5'-P-CCNC: 9 U/L (ref 13–39)
B-OH-BUTYR SERPL-MCNC: 5.62 MMOL/L (ref 0.2–0.6)
BASE EX.OXY STD BLDV CALC-SCNC: 68 % (ref 60–80)
BASE EXCESS BLDV CALC-SCNC: -5.3 MMOL/L
BILIRUB SERPL-MCNC: 0.77 MG/DL (ref 0.2–1)
BUN SERPL-MCNC: 14 MG/DL (ref 5–25)
CALCIUM SERPL-MCNC: 9.8 MG/DL (ref 8.4–10.2)
CHLORIDE SERPL-SCNC: 94 MMOL/L (ref 96–108)
CO2 SERPL-SCNC: 20 MMOL/L (ref 21–32)
CREAT SERPL-MCNC: 0.82 MG/DL (ref 0.6–1.3)
ERYTHROCYTE [DISTWIDTH] IN BLOOD BY AUTOMATED COUNT: 11.7 % (ref 11.6–15.1)
GFR SERPL CREATININE-BSD FRML MDRD: 121 ML/MIN/1.73SQ M
GLUCOSE SERPL-MCNC: 288 MG/DL (ref 65–140)
HCO3 BLDV-SCNC: 20.2 MMOL/L (ref 24–30)
HCT VFR BLD AUTO: 44.4 % (ref 36.5–49.3)
HGB BLD-MCNC: 15.3 G/DL (ref 12–17)
LIPASE SERPL-CCNC: 15 U/L (ref 11–82)
MCH RBC QN AUTO: 29.8 PG (ref 26.8–34.3)
MCHC RBC AUTO-ENTMCNC: 34.5 G/DL (ref 31.4–37.4)
MCV RBC AUTO: 86 FL (ref 82–98)
O2 CT BLDV-SCNC: 15.6 ML/DL
PCO2 BLDV: 39.4 MM HG (ref 42–50)
PH BLDV: 7.33 [PH] (ref 7.3–7.4)
PLATELET # BLD AUTO: 321 THOUSANDS/UL (ref 149–390)
PMV BLD AUTO: 9.9 FL (ref 8.9–12.7)
PO2 BLDV: 35.3 MM HG (ref 35–45)
POTASSIUM SERPL-SCNC: 4.3 MMOL/L (ref 3.5–5.3)
PROT SERPL-MCNC: 7.5 G/DL (ref 6.4–8.4)
RBC # BLD AUTO: 5.14 MILLION/UL (ref 3.88–5.62)
SODIUM SERPL-SCNC: 131 MMOL/L (ref 135–147)
WBC # BLD AUTO: 7.66 THOUSAND/UL (ref 4.31–10.16)

## 2025-06-04 PROCEDURE — 82805 BLOOD GASES W/O2 SATURATION: CPT

## 2025-06-04 PROCEDURE — 36415 COLL VENOUS BLD VENIPUNCTURE: CPT

## 2025-06-04 PROCEDURE — 99284 EMERGENCY DEPT VISIT MOD MDM: CPT

## 2025-06-04 PROCEDURE — 99283 EMERGENCY DEPT VISIT LOW MDM: CPT

## 2025-06-04 PROCEDURE — 85027 COMPLETE CBC AUTOMATED: CPT

## 2025-06-04 PROCEDURE — 80053 COMPREHEN METABOLIC PANEL: CPT

## 2025-06-04 PROCEDURE — 96374 THER/PROPH/DIAG INJ IV PUSH: CPT

## 2025-06-04 PROCEDURE — 96375 TX/PRO/DX INJ NEW DRUG ADDON: CPT

## 2025-06-04 PROCEDURE — 82010 KETONE BODYS QUAN: CPT

## 2025-06-04 PROCEDURE — 83690 ASSAY OF LIPASE: CPT

## 2025-06-04 RX ORDER — DIPHENHYDRAMINE HYDROCHLORIDE 50 MG/ML
25 INJECTION, SOLUTION INTRAMUSCULAR; INTRAVENOUS ONCE
Status: COMPLETED | OUTPATIENT
Start: 2025-06-04 | End: 2025-06-04

## 2025-06-04 RX ORDER — LIDOCAINE 50 MG/G
1 PATCH TOPICAL ONCE
Status: DISCONTINUED | OUTPATIENT
Start: 2025-06-04 | End: 2025-06-04 | Stop reason: HOSPADM

## 2025-06-04 RX ORDER — SODIUM CHLORIDE 9 MG/ML
3 INJECTION INTRAVENOUS
Status: DISCONTINUED | OUTPATIENT
Start: 2025-06-04 | End: 2025-06-04 | Stop reason: HOSPADM

## 2025-06-04 RX ORDER — DROPERIDOL 2.5 MG/ML
1.25 INJECTION, SOLUTION INTRAMUSCULAR; INTRAVENOUS ONCE
Status: COMPLETED | OUTPATIENT
Start: 2025-06-04 | End: 2025-06-04

## 2025-06-04 RX ADMIN — DROPERIDOL 1.25 MG: 2.5 INJECTION, SOLUTION INTRAMUSCULAR; INTRAVENOUS at 20:18

## 2025-06-04 RX ADMIN — DIPHENHYDRAMINE HYDROCHLORIDE 25 MG: 50 INJECTION, SOLUTION INTRAMUSCULAR; INTRAVENOUS at 20:18

## 2025-06-04 RX ADMIN — LIDOCAINE 1 PATCH: 50 PATCH CUTANEOUS at 20:18

## 2025-06-04 RX ADMIN — SODIUM CHLORIDE 2000 ML: 0.9 INJECTION, SOLUTION INTRAVENOUS at 20:18

## 2025-06-05 NOTE — ED PROVIDER NOTES
"Time reflects when diagnosis was documented in both MDM as applicable and the Disposition within this note       Time User Action Codes Description Comment    6/4/2025  8:41 PM Larry Peralta [E11.10] DKA (diabetic ketoacidosis) (HCC)     6/4/2025  8:41 PM Larry Peralta [M54.9,  G89.29] Chronic back pain     6/4/2025  8:41 PM Larry Peralta [Z91.148] Noncompliance with medication regimen     6/4/2025  8:41 PM Larry Peralta Remove [Z91.148] Noncompliance with medication regimen     6/4/2025  8:41 PM Larry Peralta [Z91.199,  Z91.148] Noncompliance with diet and medication regimen     6/4/2025  8:41 PM Larry Peralta [Z53.29] Left against medical advice           ED Disposition       ED Disposition   AMA    Condition   --    Date/Time   Wed Jun 4, 2025  8:41 PM    Comment   Date: 6/4/2025  Patient: Sagar Acuna  Admitted: 6/4/2025  7:45 PM  Attending Provider: Erik Taylor MD    Sagar Acuna or his authorized caregiver has made the decision for the patient to leave the emergency department against the ad vice of Richard Peralta PA-C. He or his authorized caregiver has been informed and understands the inherent risks, including death, DKA.  He or his authorized caregiver has decided to accept the responsibility for this decision. Sagar Acuna an d all necessary parties have been advised that he may return for further evaluation or treatment. His condition at time of discharge was stable.  Sagar Acuna had current vital signs as follows:  /71 (BP Location: Right arm)   Pulse 76    Temp 98.7 °F (37.1 °C) (Oral)   Resp 18   Ht 5' 6\" (1.676 m)   Wt 48.7 kg (107 lb 5.8 oz)                Assessment & Plan       Medical Decision Making  Patient is a 25-year-old male coming in for back pain complaints, as well as chronic nausea.  Originally patient was willing to get lab work, however after receiving droperidol, Benadryl, patient stated he was feeling " better, and want to be discharged.  Patient was unwilling to await further lab work.  Did discuss with patient that I could not rule out any life-threatening conditions at that time, however patient insisted on leaving AGAINST MEDICAL ADVICE.  Patient reports he does still have insulin at home.  Highly encourage patient to follow-up with his PCP, however patient reports that he is from [place], and has trouble with insurance and getting back there.  At time of discharge, patient was nontoxic, and was seen ambulating out of the emergency department, no acute distress without need of assistance.  Patient only received approximately 3 to 400 mL of fluid prior to him insisting on leaving.    Amount and/or Complexity of Data Reviewed  Labs: ordered. Decision-making details documented in ED Course.    Risk  Prescription drug management.        ED Course as of 06/04/25 2048 Wed Jun 04, 2025 2039 ANION GAP(!): 17 2039 Patient began to yell in his room.  Stated that he would like to leave, as he was feeling better after the medications.  Did discuss with patient that his lab work is not back at this time, and did discuss that I cannot guarantee there is no life-threatening conditions currently ongoing with him.  Patient insisted on IV being taken now, and now waiting for rest of lab work.  Patient does state that he has insulin at home       Medications   sodium chloride (PF) 0.9 % injection 3 mL (has no administration in time range)   lidocaine (LIDODERM) 5 % patch 1 patch (1 patch Topical Medication Applied 6/4/25 2018)   sodium chloride 0.9 % bolus 2,000 mL (2,000 mL Intravenous New Bag 6/4/25 2018)   droperidol (INAPSINE) injection 1.25 mg (1.25 mg Intravenous Given 6/4/25 2018)   diphenhydrAMINE (BENADRYL) injection 25 mg (25 mg Intravenous Given 6/4/25 2018)       ED Risk Strat Scores                    No data recorded                            History of Present Illness       Chief Complaint   Patient  presents with    Back Pain     Reports he has back pain that has been ongoing for months. Was seen 3 days ago leg pain.        Past Medical History[1]   Past Surgical History[2]   Family History[3]   Social History[4]   E-Cigarette/Vaping    E-Cigarette Use Never User       E-Cigarette/Vaping Substances    Nicotine No     THC No     CBD No     Flavoring No     Other No     Unknown No       I have reviewed and agree with the history as documented.     Patient is a 25-year-old male, coming in for evaluation of back pain, as well as chronic nausea.  Of note, patient was seen with similar complaints a couple days ago, refused any blood work.  Received 1 Percocet, left.  Patient does have extensive history of DKA, as well as medication noncompliance.  Patient reports that his sugar is fine, and is not the cause of his pain.  Patient reports that the back pain, started off as leg pain now rating up.  No other significant complaints at this time      Back Pain  Associated symptoms: abdominal pain    Associated symptoms: no fever        Review of Systems   Constitutional:  Negative for chills, fatigue and fever.   Gastrointestinal:  Positive for abdominal pain and nausea. Negative for vomiting.   Genitourinary:  Negative for difficulty urinating.   Musculoskeletal:  Positive for back pain.           Objective       ED Triage Vitals [06/04/25 1945]   Temperature Pulse Blood Pressure Respirations SpO2 Patient Position - Orthostatic VS   98.6 °F (37 °C) 99 132/75 16 99 % Sitting      Temp Source Heart Rate Source BP Location FiO2 (%) Pain Score    Oral Monitor Left arm -- --      Vitals      Date and Time Temp Pulse SpO2 Resp BP Pain Score FACES Pain Rating User   06/04/25 1946 98.7 °F (37.1 °C) 76 99 % 18 132/71 -- -- SB   06/04/25 1945 98.6 °F (37 °C) 99 99 % 16 132/75 -- -- DK            Physical Exam  Vitals reviewed.   Constitutional:       Appearance: Normal appearance. He is normal weight. He is ill-appearing.   HENT:       Head: Normocephalic and atraumatic.      Right Ear: External ear normal.      Left Ear: External ear normal.      Nose: Nose normal.     Eyes:      Conjunctiva/sclera: Conjunctivae normal.       Cardiovascular:      Rate and Rhythm: Normal rate.   Pulmonary:      Effort: Pulmonary effort is normal.     Musculoskeletal:         General: Normal range of motion.      Cervical back: Normal range of motion.     Skin:     General: Skin is warm and dry.     Neurological:      Mental Status: He is alert.         Results Reviewed       Procedure Component Value Units Date/Time    Comprehensive metabolic panel [[id number]]  (Abnormal) Collected: 06/04/25 2013    Lab Status: Final result Specimen: Blood from Arm, Right Updated: 06/04/25 2036     Sodium 131 mmol/L      Potassium 4.3 mmol/L      Chloride 94 mmol/L      CO2 20 mmol/L      ANION GAP 17 mmol/L      BUN 14 mg/dL      Creatinine 0.82 mg/dL      Glucose 288 mg/dL      Calcium 9.8 mg/dL      AST 9 U/L      ALT 7 U/L      Alkaline Phosphatase 115 U/L      Total Protein 7.5 g/dL      Albumin 4.9 g/dL      Total Bilirubin 0.77 mg/dL      eGFR 121 ml/min/1.73sq m     Narrative:      National Kid[employer] Disease Foundation guidelines for Chronic Kidney Disease (CKD):     Stage 1 with normal or high GFR (GFR > 90 mL/min/1.73 square meters)    Stage 2 Mild CKD (GFR = 60-89 mL/min/1.73 square meters)    Stage 3A Moderate CKD (GFR = 45-59 mL/min/1.73 square meters)    Stage 3B Moderate CKD (GFR = 30-44 mL/min/1.73 square meters)    Stage 4 Severe CKD (GFR = 15-29 mL/min/1.73 square meters)    Stage 5 End Stage CKD (GFR <15 mL/min/1.73 square meters)  Note: GFR calculation is accurate only with a steady state creatinine    Lipase [[id number]]  (Normal) Collected: 06/04/25 2013    Lab Status: Final result Specimen: Blood from Arm, Right Updated: 06/04/25 2036     Lipase 15 u/L     Blood gas, venous [[id number]]  (Abnormal) Collected: 06/04/25 2013    Lab Status: Final result  Specimen: Blood from Arm, Right Updated: 06/04/25 2021     pH, Oli 7.328     pCO2, Oli 39.4 mm Hg      pO2, Oli 35.3 mm Hg      HCO3, Oli 20.2 mmol/L      Base Excess, Oli -5.3 mmol/L      O2 Content, Oli 15.6 ml/dL      O2 HGB, VENOUS 68.0 %     CBC [[id number]]  (Normal) Collected: 06/04/25 2013    Lab Status: Final result Specimen: Blood from Arm, Right Updated: 06/04/25 2020     WBC 7.66 Thousand/uL      RBC 5.14 Million/uL      Hemoglobin 15.3 g/dL      Hematocrit 44.4 %      MCV 86 fL      MCH 29.8 pg      MCHC 34.5 g/dL      RDW 11.7 %      Platelets 321 Thousands/uL      MPV 9.9 fL     Beta Hydroxybutyrate [[id number]] Collected: 06/04/25 2013    Lab Status: In process Specimen: Blood from Arm, Right Updated: 06/04/25 2017    UA w Reflex to Microscopic w Reflex to Culture [[id number]]     Lab Status: No result Specimen: Urine             No orders to display       Procedures    ED Medication and Procedure Management   Prior to Admission Medications   Prescriptions Last Dose Informant Patient Reported? Taking?   Alcohol Swabs 70 % PADS   No No   Sig: May substitute brand based on insurance coverage. Check glucose TID.   Blood Glucose Monitoring Suppl (OneTouch Verio Reflect) w/Device KIT   No No   Sig: May substitute brand based on insurance coverage. Check glucose TID.   Insulin Pen Needle (BD Pen Needle Shelly 2nd Gen) 32G X 4 MM MISC   No No   Sig: For use with insulin pen. Pharmacy may dispense brand covered by insurance.   Insulin Pen Needle (BD Pen Needle Shelly 2nd Gen) 32G X 4 MM MISC   No No   Sig: For use with insulin pen. Pharmacy may dispense brand covered by insurance.   Insulin Pen Needle (BD Pen Needle Shelly 2nd Gen) 32G X 4 MM MISC   No No   Sig: For use with insulin pen. Pharmacy may dispense brand covered by insurance.   OneTouch Delica Lancets 33G MISC   No No   Sig: May substitute brand based on insurance coverage. Check glucose TID.   acetaminophen (TYLENOL) 325 mg tablet   No No   Sig: Take 2  tablets (650 mg total) by mouth every 6 (six) hours as needed for mild pain, headaches or fever   glucose blood (OneTouch Verio) test strip   No No   Sig: May substitute brand based on insurance coverage. Check glucose TID.   insulin isophane-insulin regular (HumuLIN 70/30 KwikPen) 100 units/mL injection pen   No No   Sig: Inject 32 Units under the skin 2 (two) times a day before meals   naproxen (NAPROSYN) 500 mg tablet   No No   Sig: Take 1 tablet (500 mg total) by mouth 2 (two) times a day with meals for 10 days   ondansetron (ZOFRAN) 4 mg tablet   No No   Sig: Take 1 tablet (4 mg total) by mouth every 8 (eight) hours as needed for nausea or vomiting   pantoprazole (PROTONIX) 40 mg tablet   No No   Sig: Take 1 tablet (40 mg total) by mouth daily   sucralfate (CARAFATE) 1 g tablet   No No   Sig: Take 1 tablet (1 g total) by mouth 4 (four) times a day for 10 days      Facility-Administered Medications: None     Patient's Medications   Discharge Prescriptions    No medications on file     No discharge procedures on file.  ED SEPSIS DOCUMENTATION   Time reflects when diagnosis was documented in both MDM as applicable and the Disposition within this note       Time User Action Codes Description Comment    6/4/2025  8:41 PM Larry Peralta [E11.10] DKA (diabetic ketoacidosis) (HCC)     6/4/2025  8:41 PM Larry Peralta [M54.9,  G89.29] Chronic back pain     6/4/2025  8:41 PM Larry Peralta [Z91.148] Noncompliance with medication regimen     6/4/2025  8:41 PM Larry Peralta [Z91.148] Noncompliance with medication regimen     6/4/2025  8:41 PM Larry Peralta [Z91.199,  Z91.148] Noncompliance with diet and medication regimen     6/4/2025  8:41 PM Larry Peralta [Z53.29] Left against medical advice                    [1]   Past Medical History:  Diagnosis Date    Diabetes mellitus (HCC)     Glaucoma     Subaortic membrane    [2]   Past Surgical History:  Procedure Laterality Date     CARDIAC SURGERY     [3]   Family History  Problem Relation Name Age of Onset    Migraines Mother     [4]   Social History  Tobacco Use    Smoking status: Former     Current packs/day: 0.00     Types: Cigarettes     Quit date:      Years since quittin.4    Smokeless tobacco: Never    Tobacco comments:     4 years smoker 13 to 17 years old,  quit   Vaping Use    Vaping status: Never Used   Substance Use Topics    Alcohol use: Yes     Comment: socially    Drug use: Not Currently        Larry Peralta PA-C  255

## 2025-06-05 NOTE — ED NOTES
Patient taking several phone calls while RN attempting to provide care. Patient refusing cardiac monitoring. Asking to leave, provider made aware.      Efrain Davis RN  06/04/25 2028

## 2025-07-29 ENCOUNTER — HOSPITAL ENCOUNTER (EMERGENCY)
Facility: HOSPITAL | Age: 25
Discharge: HOME | End: 2025-07-29
Attending: EMERGENCY MEDICINE | Admitting: EMERGENCY MEDICINE

## 2025-07-29 VITALS
DIASTOLIC BLOOD PRESSURE: 92 MMHG | SYSTOLIC BLOOD PRESSURE: 119 MMHG | TEMPERATURE: 98.9 F | BODY MASS INDEX: 17.43 KG/M2 | RESPIRATION RATE: 18 BRPM | OXYGEN SATURATION: 98 % | HEART RATE: 109 BPM | WEIGHT: 108 LBS

## 2025-07-29 DIAGNOSIS — R22.0 FACIAL SWELLING: ICD-10-CM

## 2025-07-29 DIAGNOSIS — K04.7 DENTAL INFECTION: Primary | ICD-10-CM

## 2025-07-29 PROCEDURE — 99284 EMERGENCY DEPT VISIT MOD MDM: CPT

## 2025-07-29 PROCEDURE — 99282 EMERGENCY DEPT VISIT SF MDM: CPT

## 2025-07-29 PROCEDURE — 64450 NJX AA&/STRD OTHER PN/BRANCH: CPT

## 2025-07-29 RX ORDER — BUPIVACAINE HYDROCHLORIDE 2.5 MG/ML
5 INJECTION, SOLUTION EPIDURAL; INFILTRATION; INTRACAUDAL; PERINEURAL ONCE
Status: COMPLETED | OUTPATIENT
Start: 2025-07-29 | End: 2025-07-29

## 2025-07-29 RX ORDER — LIDOCAINE HYDROCHLORIDE 10 MG/ML
5 INJECTION, SOLUTION EPIDURAL; INFILTRATION; INTRACAUDAL; PERINEURAL ONCE
Status: COMPLETED | OUTPATIENT
Start: 2025-07-29 | End: 2025-07-29

## 2025-07-29 RX ORDER — LIDOCAINE HYDROCHLORIDE 20 MG/ML
15 SOLUTION OROPHARYNGEAL ONCE
Status: COMPLETED | OUTPATIENT
Start: 2025-07-29 | End: 2025-07-29

## 2025-07-29 RX ORDER — CHLORHEXIDINE GLUCONATE ORAL RINSE 1.2 MG/ML
15 SOLUTION DENTAL 2 TIMES DAILY
Qty: 120 ML | Refills: 0 | Status: SHIPPED | OUTPATIENT
Start: 2025-07-29

## 2025-07-29 RX ADMIN — LIDOCAINE HYDROCHLORIDE 5 ML: 10 INJECTION, SOLUTION EPIDURAL; INFILTRATION; INTRACAUDAL at 11:15

## 2025-07-29 RX ADMIN — BUPIVACAINE HYDROCHLORIDE 5 ML: 2.5 INJECTION, SOLUTION EPIDURAL; INFILTRATION; INTRACAUDAL; PERINEURAL at 11:15

## 2025-07-29 RX ADMIN — LIDOCAINE HYDROCHLORIDE 15 ML: 20 SOLUTION ORAL at 11:14
